# Patient Record
Sex: FEMALE | Race: WHITE
[De-identification: names, ages, dates, MRNs, and addresses within clinical notes are randomized per-mention and may not be internally consistent; named-entity substitution may affect disease eponyms.]

---

## 2018-01-01 ENCOUNTER — HOSPITAL ENCOUNTER (INPATIENT)
Dept: HOSPITAL 45 - C.EDB | Age: 83
LOS: 25 days | Discharge: NURSING, REHAB OR LONG-TERM CARE | DRG: 562 | End: 2018-01-26
Attending: HOSPITALIST | Admitting: HOSPITALIST
Payer: COMMERCIAL

## 2018-01-01 VITALS
BODY MASS INDEX: 37.2 KG/M2 | BODY MASS INDEX: 37.2 KG/M2 | HEIGHT: 66 IN | BODY MASS INDEX: 37.2 KG/M2 | WEIGHT: 231.49 LBS | WEIGHT: 231.49 LBS | HEIGHT: 66 IN | BODY MASS INDEX: 37.2 KG/M2

## 2018-01-01 VITALS — OXYGEN SATURATION: 97 % | HEART RATE: 115 BPM

## 2018-01-01 DIAGNOSIS — E87.5: ICD-10-CM

## 2018-01-01 DIAGNOSIS — Z51.5: ICD-10-CM

## 2018-01-01 DIAGNOSIS — Z88.2: ICD-10-CM

## 2018-01-01 DIAGNOSIS — I50.31: ICD-10-CM

## 2018-01-01 DIAGNOSIS — J44.0: ICD-10-CM

## 2018-01-01 DIAGNOSIS — Z66: ICD-10-CM

## 2018-01-01 DIAGNOSIS — J96.22: ICD-10-CM

## 2018-01-01 DIAGNOSIS — J18.9: ICD-10-CM

## 2018-01-01 DIAGNOSIS — J44.1: ICD-10-CM

## 2018-01-01 DIAGNOSIS — E78.5: ICD-10-CM

## 2018-01-01 DIAGNOSIS — S32.810A: ICD-10-CM

## 2018-01-01 DIAGNOSIS — I11.0: ICD-10-CM

## 2018-01-01 DIAGNOSIS — F17.200: ICD-10-CM

## 2018-01-01 DIAGNOSIS — Z99.81: ICD-10-CM

## 2018-01-01 DIAGNOSIS — J96.21: ICD-10-CM

## 2018-01-01 DIAGNOSIS — I16.0: ICD-10-CM

## 2018-01-01 DIAGNOSIS — F32.9: ICD-10-CM

## 2018-01-01 DIAGNOSIS — Z79.82: ICD-10-CM
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
## 2018-01-01 DIAGNOSIS — I48.92: ICD-10-CM

## 2018-01-01 DIAGNOSIS — E11.9: ICD-10-CM

## 2018-01-01 DIAGNOSIS — Z91.81: ICD-10-CM

## 2018-01-01 DIAGNOSIS — E66.01: ICD-10-CM

## 2018-01-01 DIAGNOSIS — Z86.73: ICD-10-CM

## 2018-01-01 DIAGNOSIS — S82.842A: Primary | ICD-10-CM

## 2018-01-01 DIAGNOSIS — B37.0: ICD-10-CM

## 2018-01-01 DIAGNOSIS — E87.1: ICD-10-CM

## 2018-01-01 DIAGNOSIS — R29.6: ICD-10-CM

## 2018-01-01 DIAGNOSIS — K51.90: ICD-10-CM

## 2018-01-01 DIAGNOSIS — Z79.899: ICD-10-CM

## 2018-01-01 LAB
ALBUMIN SERPL-MCNC: 2.7 GM/DL (ref 3.4–5)
ALP SERPL-CCNC: 91 U/L (ref 45–117)
ALT SERPL-CCNC: 28 U/L (ref 12–78)
AST SERPL-CCNC: 22 U/L (ref 15–37)
BASOPHILS # BLD: 0.01 K/UL (ref 0–0.2)
BASOPHILS NFR BLD: 0.1 %
BUN SERPL-MCNC: 24 MG/DL (ref 7–18)
CALCIUM SERPL-MCNC: 8.6 MG/DL (ref 8.5–10.1)
CO2 SERPL-SCNC: 33 MMOL/L (ref 21–32)
CREAT SERPL-MCNC: 0.89 MG/DL (ref 0.6–1.2)
EOS ABS #: 0 K/UL (ref 0–0.5)
EOSINOPHIL NFR BLD AUTO: 309 K/UL (ref 130–400)
GLUCOSE SERPL-MCNC: 270 MG/DL (ref 70–99)
HCT VFR BLD CALC: 34.3 % (ref 37–47)
HGB BLD-MCNC: 11.3 G/DL (ref 12–16)
IG#: 0.06 K/UL (ref 0–0.02)
IMM GRANULOCYTES NFR BLD AUTO: 5.1 %
INFLUENZA B ANTIGEN: (no result)
INR PPP: 1 (ref 0.9–1.1)
LIPASE: 264 U/L (ref 73–393)
LYMPHOCYTES # BLD: 0.72 K/UL (ref 1.2–3.4)
MCH RBC QN AUTO: 30.4 PG (ref 25–34)
MCHC RBC AUTO-ENTMCNC: 32.9 G/DL (ref 32–36)
MCV RBC AUTO: 92.2 FL (ref 80–100)
MONO ABS #: 1.93 K/UL (ref 0.11–0.59)
MONOCYTES NFR BLD: 13.7 %
NEUT ABS #: 11.37 K/UL (ref 1.4–6.5)
NEUTROPHILS # BLD AUTO: 0 %
NEUTROPHILS NFR BLD AUTO: 80.7 %
PMV BLD AUTO: 9.7 FL (ref 7.4–10.4)
POTASSIUM SERPL-SCNC: 5 MMOL/L (ref 3.5–5.1)
PROT SERPL-MCNC: 7.6 GM/DL (ref 6.4–8.2)
RED CELL DISTRIBUTION WIDTH CV: 13.8 % (ref 11.5–14.5)
RED CELL DISTRIBUTION WIDTH SD: 46.5 FL (ref 36.4–46.3)
SODIUM SERPL-SCNC: 128 MMOL/L (ref 136–145)
WBC # BLD AUTO: 14.09 K/UL (ref 4.8–10.8)

## 2018-01-01 RX ADMIN — LEVALBUTEROL HYDROCHLORIDE SCH MG: 1.25 SOLUTION RESPIRATORY (INHALATION) at 22:30

## 2018-01-01 NOTE — DIAGNOSTIC IMAGING REPORT
CT SCAN OF THE BRAIN WITHOUT IV CONTRAST



CLINICAL HISTORY: Change in mental status. Fall. Dizziness.



COMPARISON STUDY:  CT of the brain dated 12/22/2016.



TECHNIQUE: Unenhanced axial CT scan of the brain is performed from the vertex to

the skull base. A dose lowering technique was utilized adhering to the

principles of ALARA. The skull base was scanned twice due to motion artifact.



CT DOSE: 1228.53 mGy.cm



FINDINGS:



Brain parenchyma: There are age-related involutional changes noting  moderate

subcortical and periventricular microangiopathic change. Right parietal

encephalomalacia is unchanged and consistent with a remote infarct. A chronic

lacunar infarct is noted in the right internal capsule. There is no hemorrhage,

mass effect, or evidence of acute territorial ischemia by CT criteria. A coarse

calcification in the cerebellum is unchanged. Gray-white matter is preserved. No

extra-axial fluid collection is seen.



Ventricles, sulci, cisterns: Prominent secondary to involutional change.



Intracranial vasculature: There is atherosclerotic calcification of the

cavernous carotid and vertebral arteries.



Calvarium: The skeletal structures are osteopenic. No depressed calvarial

fracture is seen.



Sinuses and mastoids: Mild to moderate mucosal thickening is present within the

ethmoid, sphenoid, and maxillary sinuses. Air-fluid levels are present within

the maxillary antra. There is a trace left mastoid effusion. The right mastoid

air cells are well pneumatized.



Orbits: The bony orbits are grossly intact. There are bilateral ocular lens

implants.





IMPRESSION:



1. Senescent change and remote infarct as above. There is no hemorrhage, mass

effect, or evidence of acute territorial ischemia by CT criteria.



2. Paranasal sinus disease as above. Correlate clinically for evidence of

sinusitis.







Electronically signed by:  Arvin Bailey M.D.

1/1/2018 8:06 PM



Dictated Date/Time:  1/1/2018 8:03 PM

## 2018-01-01 NOTE — DIAGNOSTIC IMAGING REPORT
SINGLE VIEW CHEST



CLINICAL HISTORY:  Fever. Cough and dyspnea.



FINDINGS: An AP, portable, upright chest radiograph is compared to study dated

12/22/2016 and correlated with chest CT dated 9/1/2011. The examination is

degraded by portable technique and patient rotation.  The heart is enlarged and

there is atherosclerotic calcification of the thoracic aorta. The pulmonary

vasculature is noncongested. There is dense bibasilar airspace consolidation,

right greater than left. Small pleural effusions are identified. No pneumothorax

is seen. The skeletal structures are osteopenic. The bony thorax is grossly

intact. Calcific tendinopathy is noted in the right shoulder.



IMPRESSION:



1. Cardiomegaly without radiographic evidence of congestive failure.



2. There is dense bibasilar airspace consolidation, right greater than left with

associated pleural effusions. The appearance is typical for pneumonia/aspiration

pneumonitis. Clinical correlation will be required and radiographic follow-up to

resolution is recommended.







Electronically signed by:  Arvin Bailey M.D.

1/1/2018 7:24 PM



Dictated Date/Time:  1/1/2018 7:23 PM

## 2018-01-01 NOTE — EMERGENCY ROOM VISIT NOTE
History


Report prepared by Rowanibmary:  Barby Bazan


Under the Supervision of:  Dr. Adrian Pacheco M.D.


First contact with patient:  18:05


Chief Complaint:  FALL


Stated Complaint:  FALL, ANKLE FX, SOB, COUGH, DIZZY, FLU LIKE SX


Nursing Triage Summary:  


arrived bls in resp distress. sat 88% on 15l mask. course expir wheezing, dim 


thru out. rr 36. bp 230/115. c/o dizzy from a "cold", fell sat on right side, 


denies injury. today fell d/t dizziness, c/o left ankle pain, swelling, 


deformity, ecchymosis.





left foot cool, delayed cap refill, holds at an outward rotation, able to 


dorsiflex and plantar flex





History of Present Illness


The patient is an 82 year old female who presents to the Emergency Room with 

complaints of an episode of a fall occurring prior to arrival. The patient 

states that she fell twice in the past three days. She reports that she fell 

both times on her left side and could not get up both times on her own. She 

notes that her sons helped her up. She states that she has not been able to 

walk well since the first fall. She states that she has not been feeling well 

the last few days. The patient complains of a cold, shortness of breath, left 

foot pain, and dizziness. The patient denies hitting her head, loss of 

consciousness, and a fever. The patient notes that she has COPD, CHF, and takes 

Lasix. She reports that she normally walks without any assistance of someone or 

a walker.





   Source of History:  patient


   Onset:  prior to arrival


   Position:  other (global)


   Quality:  other (flu-like symptoms)


   Timing:  other (episode)


   Associated Symptoms:  + SOB, No LOC, No fevers


Note:


The patient complains of left foot pain, a cold, and dizziness. The patient 

denies hitting her head.





Review of Systems


See HPI for pertinent positives and negatives.  A total of ten systems were 

reviewed and were otherwise negative.





Past Medical & Surgical


Medical Problems:


(1) Benign hypertension


(2) CHF (congestive heart failure)


(3) COPD (chronic obstructive pulmonary disease)


(4) Diabetes mellitus type 2


(5) Duodenal ulcer disease


(6) Dyslipidemia


(7) Generalized osteoarthritis


(8) Paroxysmal supraventricular tachycardia


(9) Respiratory failure


(10) TIA/CVA


(11) Ulcerative colitis








Family History





FH: cancer


FH: heart disease


Hypertension





Social History


Smoking Status:  Current Every Day Smoker


Alcohol Use:  none


Marital Status:  


Housing Status:  lives with family


Occupation Status:  retired





Current/Historical Medications


Scheduled


Aspirin (Aspirin Ec), 81 MG PO DAILY


Calcium Carbonate-Cholecalcife (Calcium/Vitamin D3 600-400 mg-Unit), 1 TAB PO 

DAILY


Citalopram (Citalopram Hydrobromide), 40 MG PO DAILY


Docusate Sodium (Docusate Sodium), 100 MG PO BID


Docusate Sodium (Colace), 1 CAP PO BID


Furosemide (Lasix), 20 MG PO QAM


Ipratropium-Albuterol (Combivent Respimat), 1 PUFFS INH QID


Latanoprost (Xalatan 0.005% Oph Sol), 1 DROPS OP HS


Lidocaine (Lidoderm Patch 5%), 1 PATCH TOP ONAMOFFPM


Lisinopril (Prinivil), 20 MG PO DAILY


Mesalamine (Pentasa), 1,000 MG PO BID


Mirtazapine (Remeron), 15 MG PO HS


Pantoprazole (Protonix), 40 MG PO DAILY


Polyethylene Glycol 3350 (Miralax), 17 GM PO DAILY


Sennosides-Docusate Sodium (Senna S), 1 TAB PO DAILY


Simvastatin (Zocor), 20 MG PO HS





Scheduled PRN


Acetaminophen (Tylenol), 650 MG PO Q4 PRN for Pain or Fever





Allergies


Coded Allergies:  


     Sulfa Drugs (Verified  Allergy, Unknown, `, 5/18/14)


     Sulfamethoxazole (Verified  Allergy, Unknown, `, 5/18/14)


     Trimethoprim (Verified  Allergy, Unknown, `, 5/18/14)





Physical Exam


Vital Signs











  Date Time  Temp Pulse Resp B/P (MAP) Pulse Ox O2 Delivery O2 Flow Rate FiO2


 


1/1/18 21:00    147/65    


 


1/1/18 20:55  95 23  96   


 


1/1/18 20:46    136/67    


 


1/1/18 20:40  95 23  97   


 


1/1/18 20:31    184/77    


 


1/1/18 20:25  96 27  98   


 


1/1/18 20:15    180/74    


 


1/1/18 20:10  101 25  99   


 


1/1/18 20:05  101 25  99   


 


1/1/18 20:04    180/80    


 


1/1/18 19:45    167/80    


 


1/1/18 19:35  91 24  98   


 


1/1/18 19:30    154/70    


 


1/1/18 19:20  93 23  99   


 


1/1/18 19:15    164/78    


 


1/1/18 19:05  105 21  99   


 


1/1/18 19:00    158/71    


 


1/1/18 18:57  99 21  100   


 


1/1/18 18:56    161/76    


 


1/1/18 18:52   17     


 


1/1/18 18:47  107 17  98   


 


1/1/18 18:42  109 24  99   


 


1/1/18 18:42  109      


 


1/1/18 18:37  112 28  98   


 


1/1/18 18:32  117 17     


 


1/1/18 18:30     93 BiPAP  100


 


1/1/18 18:27  112 18     


 


1/1/18 18:26  115   97   100


 


1/1/18 18:22  115 25     


 


1/1/18 18:17  120 35     


 


1/1/18 18:14    193/77    


 


1/1/18 18:05 37.3 115 36 230/115 93 Nebulizer  


 


1/1/18 18:00     85 Nasal Cannula 4.0 


 


1/1/18 18:00     93 Nebulizer  


 


1/1/18 17:59     80 Room Air  











Physical Exam


GENERAL: Awake, alert, in moderate respiratory distress


HENT: Normocephalic, atraumatic. Oropharynx unremarkable. Dry, cracked mucus 

membranes.


EYES: Normal conjunctiva. Sclera non-icteric.


NECK: Supple. No nuchal rigidity. FROM. No JVD.


RESPIRATORY: Diffuse rhonchi and wheezes diminished at bases. 


CARDIAC: Sinus tachycardia, normal rhythm. Extremities warm and well perfused. 

Pulses equal.


ABDOMEN: Obese, soft, non-distended. No tenderness to palpation. No rebound or 

guarding. No masses.


RECTAL: Deferred.


MUSCULOSKELETAL: Chest examination reveals no tenderness. The back is 

symmetrical on inspection without obvious abnormality. There is no CVA 

tenderness to palpation. No joint edema. 


LOWER EXTREMITIES: Calves are equal size bilaterally and non-tender. Mild edema 

with ecchymosis to the medial malleolus and tenderness. Foot is slightly cool 

compared to the right. Slightly delayed capillary refill. PT/DP pulses present. 


NEURO: Normal sensorium. No sensory or motor deficits noted. 


SKIN: No rash or jaundice noted.





Medical Decision & Procedures


ER Provider


Diagnostic Interpretation:


Radiology results as stated below per my review and radiologist interpretation: 





LEFT ANKLE 3 VIEWS





CLINICAL HISTORY: Fall with left ankle injury.





FINDINGS: 3 views of the left ankle are obtained. No prior studies are available


for comparison at the time of dictation. The skeletal structures are osteopenic.


There is a distracted fracture of the medial malleolus. The fragment is offset


by 4 mm. There is also a distracted and mildly overriding fracture through the


distal fibular shaft. There is widening of the medial joint space, with lateral


subluxation of the talus. A joint effusion is identified. Soft tissue edema is


present around the ankle. Arthritic change is noted in the foot. There is


atherosclerotic calcification of the regional arteries.





IMPRESSION:





1. Distal tibial and fibular fractures as above with mild lateral subluxation of


the talus.





2. Soft tissue swelling and joint effusion.











Electronically signed by:  Arvin Bailey M.D.


1/1/2018 7:18 PM





Dictated Date/Time:  1/1/2018 7:16 PM








LEFT FOOT 2 VIEWS





CLINICAL HISTORY: Fall with left foot pain.





FINDINGS: AP and lateral views of the left foot are obtained. No prior studies


are available for comparison at the time of dictation. The skeletal structures


are osteopenic. No fracture is seen. Mild arthritic change and bony spurring is


noted at the first metatarsophalangeal joint. Degenerative change is also seen


at the tarsometatarsal articulations. There is pes planus, with degenerative


spurring noted along the dorsal aspect of the tarsal bones. Soft tissue edema is


noted throughout the foot. Fracture is seen at the ankle joint.





IMPRESSION:





1. Soft tissue edema with no radiographic evidence of left foot fracture.





2. Fractures are partially imaged at the ankle joint.





3. Osteopenia, pes planus, and degenerative change as above.











Electronically signed by:  Arvin Bailey M.D.


1/1/2018 7:16 PM





Dictated Date/Time:  1/1/2018 7:14 PM








SINGLE VIEW PELVIS





CLINICAL HISTORY: Fall. Pelvic pain.





FINDINGS: 2 AP, portable, supine pelvic radiograph is are compared to study


dated 5/18/2014. The skeletal structures are osteopenic. A right hip


arthroplasty is in near-anatomic alignment. There is an age indeterminant right


inferior pubic ring fracture, new from 2014. No additional findings are


concerning for acute fracture. Mild arthritic change is noted in the left hip.


Sclerotic change is observed in the sacroiliac joints. There is advanced


atherosclerotic calcification of the femoral arteries. The overlying soft


tissues are normal in appearance.





IMPRESSION:





1. There is an age indeterminant right pubic ring fracture, possibly chronic.


This was not seen in 2014.





2. No additional findings are concerning for acute fracture.





3. Osteopenia, degenerative change, and right hip arthroplasty as above.











Electronically signed by:  Arvin Bailey M.D.


1/1/2018 7:14 PM





Dictated Date/Time:  1/1/2018 7:11 PM








SINGLE VIEW CHEST





CLINICAL HISTORY:  Fever. Cough and dyspnea.





FINDINGS: An AP, portable, upright chest radiograph is compared to study dated


12/22/2016 and correlated with chest CT dated 9/1/2011. The examination is


degraded by portable technique and patient rotation.  The heart is enlarged and


there is atherosclerotic calcification of the thoracic aorta. The pulmonary


vasculature is noncongested. There is dense bibasilar airspace consolidation,


right greater than left. Small pleural effusions are identified. No pneumothorax


is seen. The skeletal structures are osteopenic. The bony thorax is grossly


intact. Calcific tendinopathy is noted in the right shoulder.





IMPRESSION:





1. Cardiomegaly without radiographic evidence of congestive failure.





2. There is dense bibasilar airspace consolidation, right greater than left with


associated pleural effusions. The appearance is typical for pneumonia/aspiration


pneumonitis. Clinical correlation will be required and radiographic follow-up to


resolution is recommended.











Electronically signed by:  Arvin Bailey M.D.


1/1/2018 7:24 PM





Dictated Date/Time:  1/1/2018 7:23 PM








CT SCAN OF THE BRAIN WITHOUT IV CONTRAST





CLINICAL HISTORY: Change in mental status. Fall. Dizziness.





COMPARISON STUDY:  CT of the brain dated 12/22/2016.





TECHNIQUE: Unenhanced axial CT scan of the brain is performed from the vertex to


the skull base. A dose lowering technique was utilized adhering to the


principles of ALARA. The skull base was scanned twice due to motion artifact.





CT DOSE: 1228.53 mGy.cm





FINDINGS:





Brain parenchyma: There are age-related involutional changes noting  moderate


subcortical and periventricular microangiopathic change. Right parietal


encephalomalacia is unchanged and consistent with a remote infarct. A chronic


lacunar infarct is noted in the right internal capsule. There is no hemorrhage,


mass effect, or evidence of acute territorial ischemia by CT criteria. A coarse


calcification in the cerebellum is unchanged. Gray-white matter is preserved. No


extra-axial fluid collection is seen.





Ventricles, sulci, cisterns: Prominent secondary to involutional change.





Intracranial vasculature: There is atherosclerotic calcification of the


cavernous carotid and vertebral arteries.





Calvarium: The skeletal structures are osteopenic. No depressed calvarial


fracture is seen.





Sinuses and mastoids: Mild to moderate mucosal thickening is present within the


ethmoid, sphenoid, and maxillary sinuses. Air-fluid levels are present within


the maxillary antra. There is a trace left mastoid effusion. The right mastoid


air cells are well pneumatized.





Orbits: The bony orbits are grossly intact. There are bilateral ocular lens


implants.








IMPRESSION:





1. Senescent change and remote infarct as above. There is no hemorrhage, mass


effect, or evidence of acute territorial ischemia by CT criteria.





2. Paranasal sinus disease as above. Correlate clinically for evidence of


sinusitis.











Electronically signed by:  Arvin Bailey M.D.


1/1/2018 8:06 PM





Dictated Date/Time:  1/1/2018 8:03 PM





Laboratory Results











Test


  1/1/18


18:20 1/1/18


18:22 1/1/18


18:31 1/1/18


18:32


 


Influenza Type A Antigen


  Neg for Influ


A (NEG) 


  


  


 


 


Influenza Type B Antigen


  Neg for Influ


B (NEG) 


  


  


 


 


Prothrombin Time


  


  10.8 SECONDS


(9.0-12.0) 


  


 


 


Prothromb Time International


Ratio 


  1.0 (0.9-1.1) 


  


  


 


 


Osmolality


  


  273 mOsm/kg


(280-300) 


  


 


 


Total Bilirubin


  


  0.3 mg/dl


(0.2-1) 


  


 


 


Direct Bilirubin


  


  0.1 mg/dl


(0-0.2) 


  


 


 


Aspartate Amino Transf


(AST/SGOT) 


  22 U/L (15-37) 


  


  


 


 


Alanine Aminotransferase


(ALT/SGPT) 


  28 U/L (12-78) 


  


  


 


 


Alkaline Phosphatase


  


  91 U/L


() 


  


 


 


Troponin I


  


  < 0.015 ng/ml


(0-0.045) 


  


 


 


Pro-B-Type Natriuretic Peptide


  


  3508 pg/ml


(0-1800) 


  


 


 


Total Protein


  


  7.6 gm/dl


(6.4-8.2) 


  


 


 


Albumin


  


  2.7 gm/dl


(3.4-5.0) 


  


 


 


Lipase


  


  264 U/L


() 


  


 


 


Venous Blood pH


  


  


  7.22


(7.36-7.41) 


 


 


Venous Blood Partial Pressure


CO2 


  


  85 mmHg


(38.0-50.0) 


 


 


Venous Blood Partial Pressure


O2 


  


  47 mmHg 


  


 


 


Venous Blood HCO3   34 mmol/L  


 


Venous Blood Oxygen Saturation   74.5 %  


 


Venous Blood Base Excess   3.7 mEq/L  


 


Lactic Acid Level


  


  


  


  1.1 mmol/L


(0.4-2.0)





Laboratory results reviewed by me





Medications Administered











 Medications


  (Trade)  Dose


 Ordered  Sig/Donna


 Route  Start Time


 Stop Time Status Last Admin


Dose Admin


 


 Albuterol/


 Ipratropium


  (Duoneb)  3 ml  STK-MED ONCE


 .ROUTE  1/1/18 17:57


 1/1/18 17:58 DC 1/1/18 18:04


3 ML


 


 Methylprednisolone


 Sodium Succinate


  (Solu-Medrol IV)  125 mg  NOW  STAT


 IV  1/1/18 18:14


 1/1/18 18:19 DC 1/1/18 18:24


125 MG


 


 Albuterol/


 Ipratropium


  (Duoneb)  12 ml  ONE  ONCE


 INH  1/1/18 18:15


 1/1/18 18:19 DC 1/1/18 18:34


12 ML


 


 Magnesium Sulfate


  (Magnesium


 Sulfate)  2 gm  NOW  STAT


 IV  1/1/18 18:14


 1/1/18 18:19 DC 1/1/18 18:27


2 GM


 


 Nitroglycerin


  (Nitrostat Tab)  0.4 mg  NOW  STAT


 SL  1/1/18 18:14


 1/1/18 18:19 DC 1/1/18 18:28


0.4 MG


 


 Vancomycin HCl


 2250 mg/Sodium


 Chloride  545 ml @ 


 200 mls/hr  ONE  STAT


 IV  1/1/18 20:00


 1/1/18 22:43 DC 1/1/18 21:46


200 MLS/HR


 


 Piperacillin Sod/


 Tazobactam Sod


  (Zosyn Iv)  4.5 gm  NOW  STAT


 IV  1/1/18 20:00


 1/1/18 20:05 DC 1/1/18 20:38


4.5 GM


 


 Sodium Chloride  500 ml @ 


 125 mls/hr  Q4H STAT


 IV  1/1/18 20:00


 1/1/18 23:59 DC 1/1/18 20:32


125 MLS/HR


 


 Fentanyl Citrate


  (Fentanyl Inj)  50 mcg  NOW  ONCE


 IV  1/1/18 20:30


 1/1/18 20:31 DC 1/1/18 20:33


50 MCG











ECG


Indication:  SOB/dyspnea


Rate (beats per minute):  115


Rhythm:  sinus tachycardia


Findings:  PAC, no acute ischemic change, other (normal axis)





ED Course


1807: The patient was evaluated in room B11B. A complete history and physical 

exam was performed.





2006: I discussed the patient with Dr. Lashell Estrella will evaluate the 

patient for further treatment.





2009: I reevaluated the patient and updated her on her test results.





2012: I discussed the patient's case with Dr. Correia- Orthopedics. He agrees to 

a posterior splint with a sugar tong. He will evaluate her tomorrow.





2020: I reevaluated the patient and checked her pulses. She has strong Doppler-

able pulse TP and DP.





Medical Decision


I reviewed the patient's past medical history, medications, and the nursing 

notes as described above.





Etiologies such as infections, reactive airway disease, pneumonia, pneumothorax

, COPD, CHF, cardiac ischemia, pulmonary embolism, musculoskeletal, 

gastrointestinal, as well as others were entertained.





The patient is an 82-year-old woman with a past medical history of COPD on home 

O2 as well as CHF on Lasix who presents emergency Department with generalized 

weakness over the past several days with lightheadedness and a fall on Saturday 

under her left side and subsequent fall today onto her left side presents in 

moderate respiratory distress per hpi.  The patient is uncomfortable tachypneic 

and tachycardic with labored breathing.  She is afebrile, on exam the patient 

has diffuse wheezes and rhonchi in this diminished at the bases.  She was 

placed on BiPAP for severe work of breathing and put on continuous neb given 

Solu-Medrol and magnesium.  Bedside ultrasound does not show any significant B 

lines suggesting likely more predominant COPD picture.  IVC with minimal 

variability suggesting possible Euvolemia.  Ankle has mild edema with 

ecchymosis to the medial malleolus.  Hips with full range of motion without any 

pain. Respiratory status improved on Bipap. CXR with likley aspiration pna. 

Given patient's severity on arrival treated with Vanc/zosyn. CO2 80s suggesting 

COPD component. WBC 14. Hyponatremia to 128 with BUN/Cr > 20 suggesting mild 

prerenal component. Given 500c gentle hydration. Plain film of xray 

demonstrates fibular and medial malleolar fx with slight lateral talar 

displacement. D/w Dr. Correia, ortho on-call, who agrees with plan to splint. 

Ortho to eval inpatient for possible surgery. Case d/w Dr. Lobo, [facility] 

hospitalist, who will admit the patient for further management.





Medication Reconcilliation


Current Medication List:  was personally reviewed by me





Blood Pressure Screening


Patient's blood pressure:  Elevated blood pressure


Blood pressure disposition:  Elevated BP felt to be situational





Consults


Time Called:  2005


Consulting Physician:  Dr. Lashell Estrella Hospitalist


Returned Call:  2006


I discussed the patient with Dr. Lashell Estrella will evaluate the patient 

for further treatment.


Additional Consults:  


   Time Called:  2009


   Consulted Physician:  Dr. Correia- Orthopedics


   Returned Call:  2012


Additional Comments:


I discussed the patient's case with Dr. Correia- Orthopedics. He agrees to a 

posterior splint with a sugar tong. He will evaluate her tomorrow.





Impression





 Primary Impression:  


 Acute on chronic respiratory failure with hypoxia and hypercapnia


 Additional Impressions:  


 Pneumonia


 Bimalleolar ankle fracture


 Hyponatremia





Critical Care


I have personally spent greater than 90 minutes of critical care time in the 

direct management of this patient.  This includes bedside care, interpretation 

of diagnostic studies, and testing, discussion with consultants, patient, and 

family members, and other required patient management activities.  This 90 

minutes is in excess of all separately billable procedures.





Scribe Attestation


The scribe's documentation has been prepared under my direction and personally 

reviewed by me in its entirety. I confirm that the note above accurately 

reflects all work, treatment, procedures, and medical decision making performed 

by me.





Departure Information


Dispostion


Being Evaluated By Hospitalist





Josias Lundberg M.D. (PCP)





Patient Instructions


My [facility]





Problem Qualifiers

## 2018-01-01 NOTE — DIAGNOSTIC IMAGING REPORT
SINGLE VIEW PELVIS



CLINICAL HISTORY: Fall. Pelvic pain.



FINDINGS: 2 AP, portable, supine pelvic radiograph is are compared to study

dated 5/18/2014. The skeletal structures are osteopenic. A right hip

arthroplasty is in near-anatomic alignment. There is an age indeterminant right

inferior pubic ring fracture, new from 2014. No additional findings are

concerning for acute fracture. Mild arthritic change is noted in the left hip.

Sclerotic change is observed in the sacroiliac joints. There is advanced

atherosclerotic calcification of the femoral arteries. The overlying soft

tissues are normal in appearance.



IMPRESSION:



1. There is an age indeterminant right pubic ring fracture, possibly chronic.

This was not seen in 2014.



2. No additional findings are concerning for acute fracture.



3. Osteopenia, degenerative change, and right hip arthroplasty as above.







Electronically signed by:  Arvin Bailey M.D.

1/1/2018 7:14 PM



Dictated Date/Time:  1/1/2018 7:11 PM

## 2018-01-01 NOTE — DIAGNOSTIC IMAGING REPORT
LEFT ANKLE 3 VIEWS



CLINICAL HISTORY: Fall with left ankle injury.



FINDINGS: 3 views of the left ankle are obtained. No prior studies are available

for comparison at the time of dictation. The skeletal structures are osteopenic.

There is a distracted fracture of the medial malleolus. The fragment is offset

by 4 mm. There is also a distracted and mildly overriding fracture through the

distal fibular shaft. There is widening of the medial joint space, with lateral

subluxation of the talus. A joint effusion is identified. Soft tissue edema is

present around the ankle. Arthritic change is noted in the foot. There is

atherosclerotic calcification of the regional arteries.



IMPRESSION:



1. Distal tibial and fibular fractures as above with mild lateral subluxation of

the talus.



2. Soft tissue swelling and joint effusion.







Electronically signed by:  Arvin Bailey M.D.

1/1/2018 7:18 PM



Dictated Date/Time:  1/1/2018 7:16 PM

## 2018-01-01 NOTE — DIAGNOSTIC IMAGING REPORT
LEFT FOOT 2 VIEWS



CLINICAL HISTORY: Fall with left foot pain.



FINDINGS: AP and lateral views of the left foot are obtained. No prior studies

are available for comparison at the time of dictation. The skeletal structures

are osteopenic. No fracture is seen. Mild arthritic change and bony spurring is

noted at the first metatarsophalangeal joint. Degenerative change is also seen

at the tarsometatarsal articulations. There is pes planus, with degenerative

spurring noted along the dorsal aspect of the tarsal bones. Soft tissue edema is

noted throughout the foot. Fracture is seen at the ankle joint.



IMPRESSION:



1. Soft tissue edema with no radiographic evidence of left foot fracture.



2. Fractures are partially imaged at the ankle joint.



3. Osteopenia, pes planus, and degenerative change as above.







Electronically signed by:  Arvin Bailey M.D.

1/1/2018 7:16 PM



Dictated Date/Time:  1/1/2018 7:14 PM

## 2018-01-02 VITALS — HEART RATE: 113 BPM | OXYGEN SATURATION: 92 %

## 2018-01-02 VITALS
TEMPERATURE: 97.7 F | SYSTOLIC BLOOD PRESSURE: 172 MMHG | DIASTOLIC BLOOD PRESSURE: 80 MMHG | HEART RATE: 104 BPM | OXYGEN SATURATION: 95 %

## 2018-01-02 VITALS
OXYGEN SATURATION: 90 % | HEART RATE: 104 BPM | DIASTOLIC BLOOD PRESSURE: 93 MMHG | SYSTOLIC BLOOD PRESSURE: 147 MMHG | TEMPERATURE: 97.52 F

## 2018-01-02 VITALS — OXYGEN SATURATION: 93 % | HEART RATE: 106 BPM

## 2018-01-02 VITALS — OXYGEN SATURATION: 92 %

## 2018-01-02 VITALS
TEMPERATURE: 98.24 F | DIASTOLIC BLOOD PRESSURE: 69 MMHG | HEART RATE: 104 BPM | OXYGEN SATURATION: 93 % | SYSTOLIC BLOOD PRESSURE: 139 MMHG

## 2018-01-02 VITALS — HEART RATE: 75 BPM | OXYGEN SATURATION: 94 %

## 2018-01-02 VITALS
DIASTOLIC BLOOD PRESSURE: 74 MMHG | SYSTOLIC BLOOD PRESSURE: 158 MMHG | OXYGEN SATURATION: 94 % | HEART RATE: 78 BPM | TEMPERATURE: 99.14 F

## 2018-01-02 VITALS
OXYGEN SATURATION: 92 % | DIASTOLIC BLOOD PRESSURE: 119 MMHG | SYSTOLIC BLOOD PRESSURE: 178 MMHG | TEMPERATURE: 98.06 F | HEART RATE: 112 BPM

## 2018-01-02 VITALS — OXYGEN SATURATION: 93 % | HEART RATE: 105 BPM

## 2018-01-02 VITALS — OXYGEN SATURATION: 94 % | HEART RATE: 80 BPM

## 2018-01-02 VITALS
DIASTOLIC BLOOD PRESSURE: 78 MMHG | HEART RATE: 90 BPM | SYSTOLIC BLOOD PRESSURE: 169 MMHG | OXYGEN SATURATION: 94 % | TEMPERATURE: 99.14 F

## 2018-01-02 VITALS — HEART RATE: 103 BPM | OXYGEN SATURATION: 92 %

## 2018-01-02 VITALS — OXYGEN SATURATION: 94 % | HEART RATE: 97 BPM

## 2018-01-02 LAB
BASOPHILS # BLD: 0.01 K/UL (ref 0–0.2)
BASOPHILS NFR BLD: 0.1 %
BUN SERPL-MCNC: 23 MG/DL (ref 7–18)
BUN SERPL-MCNC: 23 MG/DL (ref 7–18)
CALCIUM SERPL-MCNC: 8.3 MG/DL (ref 8.5–10.1)
CALCIUM SERPL-MCNC: 8.6 MG/DL (ref 8.5–10.1)
CO2 SERPL-SCNC: 32 MMOL/L (ref 21–32)
CO2 SERPL-SCNC: 34 MMOL/L (ref 21–32)
CREAT SERPL-MCNC: 0.69 MG/DL (ref 0.6–1.2)
CREAT SERPL-MCNC: 0.81 MG/DL (ref 0.6–1.2)
EOS ABS #: 0 K/UL (ref 0–0.5)
EOSINOPHIL NFR BLD AUTO: 259 K/UL (ref 130–400)
GLUCOSE SERPL-MCNC: 134 MG/DL (ref 70–99)
GLUCOSE SERPL-MCNC: 216 MG/DL (ref 70–99)
HCT VFR BLD CALC: 29.3 % (ref 37–47)
HGB BLD-MCNC: 9.4 G/DL (ref 12–16)
IG#: 0.04 K/UL (ref 0–0.02)
IMM GRANULOCYTES NFR BLD AUTO: 4.5 %
LYMPHOCYTES # BLD: 0.46 K/UL (ref 1.2–3.4)
MCH RBC QN AUTO: 29.4 PG (ref 25–34)
MCHC RBC AUTO-ENTMCNC: 32.1 G/DL (ref 32–36)
MCV RBC AUTO: 91.6 FL (ref 80–100)
MONO ABS #: 1.22 K/UL (ref 0.11–0.59)
MONOCYTES NFR BLD: 12 %
NEUT ABS #: 8.46 K/UL (ref 1.4–6.5)
NEUTROPHILS # BLD AUTO: 0 %
NEUTROPHILS NFR BLD AUTO: 83 %
PMV BLD AUTO: 9.6 FL (ref 7.4–10.4)
POTASSIUM SERPL-SCNC: 5.2 MMOL/L (ref 3.5–5.1)
POTASSIUM SERPL-SCNC: 5.5 MMOL/L (ref 3.5–5.1)
RED CELL DISTRIBUTION WIDTH CV: 13.8 % (ref 11.5–14.5)
RED CELL DISTRIBUTION WIDTH SD: 46.5 FL (ref 36.4–46.3)
SODIUM SERPL-SCNC: 129 MMOL/L (ref 136–145)
SODIUM SERPL-SCNC: 131 MMOL/L (ref 136–145)
WBC # BLD AUTO: 10.19 K/UL (ref 4.8–10.8)

## 2018-01-02 RX ADMIN — SODIUM CHLORIDE SCH MLS/HR: 900 INJECTION, SOLUTION INTRAVENOUS at 01:31

## 2018-01-02 RX ADMIN — STANDARDIZED SENNA CONCENTRATE AND DOCUSATE SODIUM SCH TAB: 8.6; 5 TABLET ORAL at 07:59

## 2018-01-02 RX ADMIN — PIPERACILLIN SODIUM, TAZOBACTAM SODIUM SCH MLS/HR: 4; .5 INJECTION, POWDER, LYOPHILIZED, FOR SOLUTION INTRAVENOUS at 10:00

## 2018-01-02 RX ADMIN — SODIUM CHLORIDE SCH MLS/HR: 900 INJECTION, SOLUTION INTRAVENOUS at 18:17

## 2018-01-02 RX ADMIN — LEVALBUTEROL HYDROCHLORIDE SCH MG: 1.25 SOLUTION RESPIRATORY (INHALATION) at 02:10

## 2018-01-02 RX ADMIN — PANTOPRAZOLE SCH MG: 40 TABLET, DELAYED RELEASE ORAL at 07:57

## 2018-01-02 RX ADMIN — LEVALBUTEROL HYDROCHLORIDE SCH MG: 1.25 SOLUTION RESPIRATORY (INHALATION) at 14:07

## 2018-01-02 RX ADMIN — METHYLPREDNISOLONE SODIUM SUCCINATE SCH MLS/MIN: 1 INJECTION, POWDER, FOR SOLUTION INTRAMUSCULAR; INTRAVENOUS at 01:32

## 2018-01-02 RX ADMIN — TRAMADOL HYDROCHLORIDE PRN MG: 50 TABLET, COATED ORAL at 07:55

## 2018-01-02 RX ADMIN — PIPERACILLIN SODIUM, TAZOBACTAM SODIUM SCH MLS/HR: 4; .5 INJECTION, POWDER, LYOPHILIZED, FOR SOLUTION INTRAVENOUS at 18:16

## 2018-01-02 RX ADMIN — MESALAMINE SCH MG: 250 CAPSULE ORAL at 07:56

## 2018-01-02 RX ADMIN — LATANOPROST SCH DROPS: 50 SOLUTION/ DROPS OPHTHALMIC at 19:49

## 2018-01-02 RX ADMIN — METHYLPREDNISOLONE SODIUM SUCCINATE SCH MLS/MIN: 1 INJECTION, POWDER, FOR SOLUTION INTRAMUSCULAR; INTRAVENOUS at 14:19

## 2018-01-02 RX ADMIN — MESALAMINE SCH MG: 250 CAPSULE ORAL at 19:49

## 2018-01-02 RX ADMIN — CITALOPRAM HYDROBROMIDE SCH MG: 40 TABLET ORAL at 07:57

## 2018-01-02 RX ADMIN — CLONIDINE HYDROCHLORIDE PRN MG: 0.1 TABLET ORAL at 19:48

## 2018-01-02 RX ADMIN — LEVOFLOXACIN SCH MLS/HR: 5 INJECTION, SOLUTION INTRAVENOUS at 05:51

## 2018-01-02 RX ADMIN — HEPARIN SODIUM SCH UNIT: 10000 INJECTION, SOLUTION INTRAVENOUS; SUBCUTANEOUS at 07:57

## 2018-01-02 RX ADMIN — MIRTAZAPINE SCH MG: 15 TABLET, FILM COATED ORAL at 19:50

## 2018-01-02 RX ADMIN — PIPERACILLIN SODIUM, TAZOBACTAM SODIUM SCH MLS/HR: 4; .5 INJECTION, POWDER, LYOPHILIZED, FOR SOLUTION INTRAVENOUS at 01:32

## 2018-01-02 RX ADMIN — SIMVASTATIN SCH MG: 20 TABLET, FILM COATED ORAL at 19:51

## 2018-01-02 RX ADMIN — METHYLPREDNISOLONE SODIUM SUCCINATE SCH MLS/MIN: 1 INJECTION, POWDER, FOR SOLUTION INTRAMUSCULAR; INTRAVENOUS at 07:56

## 2018-01-02 RX ADMIN — LISINOPRIL SCH MG: 20 TABLET ORAL at 07:57

## 2018-01-02 RX ADMIN — METHYLPREDNISOLONE SODIUM SUCCINATE SCH MLS/MIN: 1 INJECTION, POWDER, FOR SOLUTION INTRAMUSCULAR; INTRAVENOUS at 19:48

## 2018-01-02 RX ADMIN — LEVALBUTEROL HYDROCHLORIDE SCH MG: 1.25 SOLUTION RESPIRATORY (INHALATION) at 07:04

## 2018-01-02 RX ADMIN — LEVALBUTEROL HYDROCHLORIDE SCH MG: 1.25 SOLUTION RESPIRATORY (INHALATION) at 19:09

## 2018-01-02 NOTE — PROGRESS NOTE
Medicine Progress Note


Date & Time of Visit:


Jan 2, 2018 at 10:20.


Subjective


Pt was seen and examined


Lying in bed with mild respiratory distress


Pt said that her breathing seems slightly better


Denies any chest pain, palpitation, dizziness and fever





Objective





Last 8 Hrs








  Date Time  Temp Pulse Resp B/P (MAP) Pulse Ox O2 Delivery O2 Flow Rate FiO2


 


1/2/18 09:00      Oxymask 10.0 


 


1/2/18 08:22 36.5 104 28 172/80 (110) 95   


 


1/2/18 07:04  105 26  93 Mask 10.0 


 


1/2/18 04:14 37.3 78 25 158/74 (102) 94 BiPAP  


 


1/2/18 04:00      BiPAP  50








Physical Exam:


General- No acute distress


Head-  atraumatic


Eyes- PERRL, EOMI


ENT- oropharynx clear


Neck- supple, no JVD


Lungs- clear to auscultation 


Heart- regular rhythm; no murmur


Abdomen- normal bowel sounds, soft


Extremities- no calf tenderness, Left ankle pain, splint with ace bandage in LE


Neuro- alert, oriented x 3; PERRL, EOMI


Skin- warm & dry


Laboratory Results:





Last 24 Hours








Test


  1/1/18


18:20 1/1/18


18:22 1/1/18


18:31 1/1/18


18:32


 


Influenza Type A Antigen


  Neg for Influ


A 


  


  


 


 


Influenza Type B Antigen


  Neg for Influ


B 


  


  


 


 


White Blood Count  14.09 K/uL   


 


Red Blood Count  3.72 M/uL   


 


Hemoglobin  11.3 g/dL   


 


Hematocrit  34.3 %   


 


Mean Corpuscular Volume  92.2 fL   


 


Mean Corpuscular Hemoglobin  30.4 pg   


 


Mean Corpuscular Hemoglobin


Concent 


  32.9 g/dl 


  


  


 


 


Platelet Count  309 K/uL   


 


Mean Platelet Volume  9.7 fL   


 


Neutrophils (%) (Auto)  80.7 %   


 


Lymphocytes (%) (Auto)  5.1 %   


 


Monocytes (%) (Auto)  13.7 %   


 


Eosinophils (%) (Auto)  0.0 %   


 


Basophils (%) (Auto)  0.1 %   


 


Neutrophils # (Auto)  11.37 K/uL   


 


Lymphocytes # (Auto)  0.72 K/uL   


 


Monocytes # (Auto)  1.93 K/uL   


 


Eosinophils # (Auto)  0.00 K/uL   


 


Basophils # (Auto)  0.01 K/uL   


 


RDW Standard Deviation  46.5 fL   


 


RDW Coefficient of Variation  13.8 %   


 


Immature Granulocyte % (Auto)  0.4 %   


 


Immature Granulocyte # (Auto)  0.06 K/uL   


 


Prothrombin Time  10.8 SECONDS   


 


Prothromb Time International


Ratio 


  1.0 


  


  


 


 


Sodium Level  128 mmol/L   


 


Potassium Level  5.0 mmol/L   


 


Chloride Level  93 mmol/L   


 


Carbon Dioxide Level  33 mmol/L   


 


Anion Gap  2.0 mmol/L   


 


Blood Urea Nitrogen  24 mg/dl   


 


Creatinine  0.89 mg/dl   


 


Est Creatinine Clear Calc


Drug Dose 


  58.1 ml/min 


  


  


 


 


Estimated GFR (


American) 


  70.0 


  


  


 


 


Estimated GFR (Non-


American 


  60.4 


  


  


 


 


BUN/Creatinine Ratio  26.6   


 


Random Glucose  270 mg/dl   


 


Osmolality  273 mOsm/kg   


 


Calcium Level  8.6 mg/dl   


 


Total Bilirubin  0.3 mg/dl   


 


Direct Bilirubin  0.1 mg/dl   


 


Aspartate Amino Transf


(AST/SGOT) 


  22 U/L 


  


  


 


 


Alanine Aminotransferase


(ALT/SGPT) 


  28 U/L 


  


  


 


 


Alkaline Phosphatase  91 U/L   


 


Troponin I  < 0.015 ng/ml   


 


Pro-B-Type Natriuretic Peptide  3508 pg/ml   


 


Total Protein  7.6 gm/dl   


 


Albumin  2.7 gm/dl   


 


Lipase  264 U/L   


 


Venous Blood pH   7.22  


 


Venous Blood Partial Pressure


CO2 


  


  85 mmHg 


  


 


 


Venous Blood Partial Pressure


O2 


  


  47 mmHg 


  


 


 


Venous Blood HCO3   34 mmol/L  


 


Venous Blood Oxygen Saturation   74.5 %  


 


Venous Blood Base Excess   3.7 mEq/L  


 


Lactic Acid Level    1.1 mmol/L 


 


Test


  1/1/18


23:20 1/1/18


23:35 1/2/18


03:59 1/2/18


06:32


 


Urine Color YELLOW    


 


Urine Appearance CLOUDY    


 


Urine pH 5.0    


 


Urine Specific Gravity 1.025    


 


Urine Protein 2+    


 


Urine Glucose (UA) 2+    


 


Urine Ketones NEG    


 


Urine Occult Blood NEG    


 


Urine Nitrite NEG    


 


Urine Bilirubin NEG    


 


Urine Urobilinogen NEG    


 


Urine Leukocyte Esterase TRACE    


 


Urine WBC (Auto) 10-30 /hpf    


 


Urine RBC (Auto) 0-4 /hpf    


 


Urine Hyaline Casts (Auto) 5-10 /lpf    


 


Urine Epithelial Cells (Auto) >30 /lpf    


 


Urine Bacteria (Auto) 2+    


 


Urine Renal Epithelial Cells 0-5 /lpf    


 


Urine Pathogenic Casts


  1-5 GRANULAR


CASTS /lpf 


  


  


 


 


Urine Yeast (Auto)     


 


Urine Random Sodium 23 mEq/L    


 


Arterial Blood pH  7.28   7.31 


 


Arterial Blood Partial


Pressure CO2 


  71 mmHg 


  


  65 mmHg 


 


 


Arterial Blood Partial


Pressure O2 


  103 mm/Hg 


  


  73 mm/Hg 


 


 


Arterial Blood HCO3  33 mmol/L   32 mmol/L 


 


Arterial Blood Oxygen


Saturation 


  95.7 % 


  


  91.0 % 


 


 


Arterial Blood Base Excess  4.4 mEq/L   4.5 mEq/L 


 


Arterial Blood Gas Delivery  70%   10L 


 


Alberto Test  POS   POS 


 


White Blood Count   10.19 K/uL  


 


Red Blood Count   3.20 M/uL  


 


Hemoglobin   9.4 g/dL  


 


Hematocrit   29.3 %  


 


Mean Corpuscular Volume   91.6 fL  


 


Mean Corpuscular Hemoglobin   29.4 pg  


 


Mean Corpuscular Hemoglobin


Concent 


  


  32.1 g/dl 


  


 


 


Platelet Count   259 K/uL  


 


Mean Platelet Volume   9.6 fL  


 


Neutrophils (%) (Auto)   83.0 %  


 


Lymphocytes (%) (Auto)   4.5 %  


 


Monocytes (%) (Auto)   12.0 %  


 


Eosinophils (%) (Auto)   0.0 %  


 


Basophils (%) (Auto)   0.1 %  


 


Neutrophils # (Auto)   8.46 K/uL  


 


Lymphocytes # (Auto)   0.46 K/uL  


 


Monocytes # (Auto)   1.22 K/uL  


 


Eosinophils # (Auto)   0.00 K/uL  


 


Basophils # (Auto)   0.01 K/uL  


 


RDW Standard Deviation   46.5 fL  


 


RDW Coefficient of Variation   13.8 %  


 


Immature Granulocyte % (Auto)   0.4 %  


 


Immature Granulocyte # (Auto)   0.04 K/uL  


 


Sodium Level   129 mmol/L  


 


Potassium Level   5.5 mmol/L  


 


Chloride Level   95 mmol/L  


 


Carbon Dioxide Level   34 mmol/L  


 


Anion Gap   0.0 mmol/L  


 


Blood Urea Nitrogen   23 mg/dl  


 


Creatinine   0.81 mg/dl  


 


Est Creatinine Clear Calc


Drug Dose 


  


  64.3 ml/min 


  


 


 


Estimated GFR (


American) 


  


  78.4 


  


 


 


Estimated GFR (Non-


American 


  


  67.6 


  


 


 


BUN/Creatinine Ratio   28.4  


 


Random Glucose   216 mg/dl  


 


Calcium Level   8.3 mg/dl  


 


Test


  1/2/18


07:43 


  


  


 


 


Sodium Level 131 mmol/L    














 Date/Time


Source Procedure


Growth Status


 


 


 1/1/18 18:31


Blood Blood Culture


Pending Received


 


 1/1/18 18:22


Blood Blood Culture


Pending Received


 


 1/1/18 23:20


Urine,Catheterized Urine Culture


Pending Received











Assessment & Plan


Acute hypercapnic respiratory failure 


Mostly related to COPD exacerbation vs Pneumonia


CXR showed dense bibasilar airspace consolidation, right greater than left with 

associated pleural effusions.


ABG on admission showed respiratory acidosis with comp


On abx with Levaquin and Zosyn


Negative influenza test 


Continue Solumedrol


Continue supplement oxygen and intermittent BIPAP as tolerated


Continue respiratory treatment with Duoneb


Blood cx pending


Check sputum cx


monitor closely








Hyponatremia, 


Possible related to hypovolemia  


Na on admission 128


Na today 131


On IVF with NS, will monitor closely





Hyperkalemia


K 5.5


Check BMP later


If stay high, will give Kayexalate





Left Ankle fracture s/p fall 


Left ankle xray showed distal tibial and fibular fractures as above with mild 

lateral subluxation of the talus.


Ortho on board


pain control


On 10L oxygen now, high risk for surgery for now


Will reassess her tomorrow for her respiratory status  


Pulmonology on board





Right pelvic ring fracture, 


Pelvis xray showed an age indeterminant right pubic ring fracture, possibly 

chronic.


Stable








Hypertension


BP elevated 


Continue lisinopril.  


PRN clonidine


Continue monitor BP








Ulcerative colitis


On Pentasa


Stable





Depression


On Celexa


Stable





DVT px 


On heparin.  





Code DNR as per my discussion with Pt


Current Inpatient Medications:





Current Inpatient Medications








 Medications


  (Trade)  Dose


 Ordered  Sig/Donna


 Route  Start Time


 Stop Time Status Last Admin


Dose Admin


 


 Levalbuterol


  (Xopenex 1.25MG/


 3ML Neb)  1.25 mg  Q6R


 INH  1/1/18 22:30


 1/31/18 22:29  1/2/18 07:04


1.25 MG


 


 Methylprednisolone


 Sodium Succinate


 40 mg/Syringe  0.64 ml @ 


 1.5 mls/min  Q6H


 IV  1/2/18 02:00


 2/1/18 01:59  1/2/18 07:56


1.5 MLS/MIN


 


 Levofloxacin


  (Consult)  1 ea  UD  PRN


 N/A  1/2/18 01:09


 2/1/18 01:08   


 


 


 Tramadol HCl


  (Ultram Tab)  50 mg  Q6H  PRN


 PO  1/1/18 21:30


 1/31/18 21:29  1/2/18 07:55


50 MG


 


 Clonidine HCl


  (Catapres Tab)  0.1 mg  Q6H  PRN


 PO  1/1/18 21:30


 1/31/18 21:29   


 


 


 Citalopram


 Hydrobromide


  (celeXA TAB)  40 mg  DAILY


 PO  1/2/18 09:00


 2/1/18 08:59  1/2/18 07:57


40 MG


 


 Latanoprost


  (Xalatan Oph


 Soln)  1 drops  HS


 OP  1/2/18 21:00


 2/1/18 20:59   


 


 


 Lisinopril


  (Zestril Tab)  20 mg  DAILY


 PO  1/2/18 09:00


 2/1/18 08:59  1/2/18 07:57


20 MG


 


 Mesalamine


  (Pentasa


 Controlled Rel


 Cap)  1,000 mg  BID


 PO  1/2/18 09:00


 2/1/18 08:59  1/2/18 07:56


1,000 MG


 


 Mirtazapine


  (Remeron Tab)  15 mg  HS


 PO  1/2/18 21:00


 2/1/18 20:59   


 


 


 Pantoprazole


 Sodium


  (Protonix Tab)  40 mg  DAILY


 PO  1/2/18 09:00


 2/1/18 08:59  1/2/18 07:57


40 MG


 


 Senna/Docusate


 Sodium


  (Senokot S Tab)  1 tab  DAILY


 PO  1/2/18 09:00


 2/1/18 08:59  1/2/18 07:59


1 TAB


 


 Simvastatin


  (Zocor Tab)  20 mg  HS


 PO  1/2/18 21:00


 2/1/18 20:59   


 


 


 Heparin Sodium


  (Porcine)


  (Heparin Sq 5000


 Unit/0.5ml)  5,000 unit  Q12H


 SQ  1/2/18 09:00


 2/1/18 08:59  1/2/18 07:57


5,000 UNIT


 


 Sodium Chloride  1,000 ml @ 


 100 mls/hr  Q10H


 IV  1/1/18 21:39


 1/31/18 21:38  1/2/18 01:31


100 MLS/HR


 


 Acetaminophen


  (Tylenol Tab)  650 mg  Q4H  PRN


 PO  1/1/18 21:45


 1/31/18 21:44   


 


 


 Ondansetron HCl


  (Zofran Inj)  4 mg  Q6H  PRN


 IV  1/1/18 21:45


 1/31/18 21:44   


 


 


 Piperacillin Sod/


 Tazobactam Sod


 4.5 gm/Dextrose  120 ml @ 


 30 mls/hr  Q8H


 IV  1/2/18 02:00


 1/9/18 01:59  1/2/18 10:00


30 MLS/HR


 


 Levofloxacin 750


 mg/Prmx  150 ml @ 


 100 mls/hr  Q24H


 IV  1/2/18 06:00


 1/9/18 05:59  1/2/18 05:51


100 MLS/HR


 


 Piperacillin Sod/


 Tazobactam Sod


  (Consult)  1 ea  UD  PRN


 N/A  1/2/18 01:15


 2/1/18 01:14

## 2018-01-02 NOTE — CONSULTATION REPORT
DATE OF CONSULTATION:  01/02/2018

 

DATE OF CONSULTATION:  01/02/2018  

 

REASON FOR CONSULT:  Left ankle fracture.

 

HISTORY OF PRESENT ILLNESS:  The patient is an 82-year-old white female who

was admitted by [facility] yesterday for acute hypercapnic

respiratory failure secondary to chronic obstructive pulmonary disease

exacerbation and question of pneumonia.  Prior to coming in the patient was

having increasing shortness of breath over the last several days.  She had

fallen approximately 2-3 days ago and was still continuing to try to walk on

her foot and was having left foot pain and ankle pain.  She apparently had 1

or 2 more falls since that time because of losing her balance.  She came to

the Emergency Room and then was admitted but x-rays were taken of the left

ankle and it was found that she had a  bimalleolar ankle fracture of the 

left ankle.  It was splinted and we have now been consulted to see her for

her ankle fracture.

 

PAST MEDICAL HISTORY:  COPD with oxygen use at home 2 liters during the day

and 4 liters at nighttime, hypertension, ulcerative colitis, depression,

dyslipidemia, status post CEA.

 

PAST SURGICAL HISTORY:  Colonoscopy, EGD, total hip replacement on the right,

hysterectomy.

 

MEDICATIONS:  Combivent 4 times daily, lisinopril 20 mg p.o. daily, Lasix 20

mg p.o. daily, aspirin 81 mg p.o. daily, Pentasa  1000 mg p.o. b.i.d., Celexa

40 mg p.o. daily, mirtazapine 50 mg at bedtime, simvastatin 20 mg p.o. daily,

Protonix 40 mg p.o. daily.

 

FAMILY AND SOCIAL HISTORY:  As per admitting history and physical.

 

ALLERGIES:  SULFA DRUGS, SULFAMETHOXAZOLE AND TRIMETHOPRIM.

 

REVIEW OF SYSTEMS:  As per admitting history and physical.

 

PHYSICAL EXAMINATION:

GENERAL:  The patient is an elderly white female who appears her stated age. 

She is sitting up in bed and has just finished eating her breakfast.  She has

a Ventimask on and appears to be mildly short of breath.  She is alert and

oriented to person and place and answers questions appropriately.

EXTREMITIES:  On examination of her left leg, she  has a posterior splint

applied to the left lower extremity from the left ankle to the knee.  Toes

are pink and warm and she is able to move the toes well at this time.  She

has some slight decreased sensation in the toes compared to the right. 

Splint is left on during whole exam.  She is nontender at the left knee and

left hip.



LLE NVSI +EHL/FHL, SILT grossly, CR< 2 seconds.  Splint intact. 

 

ASSESSMENT:  Bimalleolar ankle fracture, left ankle.

 

PLAN:  The patient will need ORIF of the ankle; however, with her current

status we will have to wait to see if she will be cleared for surgery by

medicine service.  I have contacted Dr. Hendrix and discussed the case with

him and with her continually needing 10 liters of O2 on Ventimask at this

time and also eating breakfast we will plan on holding on surgery until she

has been cleared by the medicine service.

 

-NWB LLE

-Ice/elevate LLE

-Maintain splint

-Pain control

-Plan for ORIF left ankle once medically cleared

 

ÁNGEL

## 2018-01-02 NOTE — PHARMACY PROGRESS NOTE
Glycemic Control: Initial Note


Date of Service


Jan 2, 2018.





Scope


Glycemic Pharmacist to provide recommendations to improve glycemic control (all 

ICU patients are screened for hyperglycemia and treatment recommendations are 

provided per protocol).





Pt identified with hyperglycemia (BSG above 180) [place] [facility] [place]

[facility]





Subjective


The patient is a 82 year old female admitted on Jan 1, 2018 at 21:03 for 

hypercapnic resp failure secondary to COPD exac / pneumonia as well as fall w/ 

L ankle and R pelvic ring fx's.  Patient's past medical history IS NOT 

significant for diabetes mellitus





Objective


Height (Feet):  5


Height (Inches):  6.00


Weight (Kilograms):  103.200


Accuchecks BSG (last 24hrs):











Test


  1/1/18


18:22 1/2/18


03:59


 


Random Glucose


  270 mg/dl


(70-99) 216 mg/dl


(70-99)








Laboratory Data (last 24hrs)











Test


  1/1/18


18:22 1/2/18


03:59 1/2/18


07:43


 


Anion Gap 2.0 mmol/L  0.0 mmol/L  


 


BUN/Creatinine Ratio 26.6  28.4  


 


Blood Urea Nitrogen 24 mg/dl  23 mg/dl  


 


Creatinine 0.89 mg/dl  0.81 mg/dl  


 


Potassium Level 5.0 mmol/L  5.5 mmol/L  


 


Sodium Level 128 mmol/L  129 mmol/L  131 mmol/L 


 


White Blood Count 14.09 K/uL  10.19 K/uL  


 


Red Blood Count 3.72 M/uL  3.20 M/uL  


 


Hemoglobin 11.3 g/dL  9.4 g/dL  


 


Hematocrit 34.3 %  29.3 %  


 


Mean Corpuscular Volume 92.2 fL  91.6 fL  


 


Mean Corpuscular Hemoglobin 30.4 pg  29.4 pg  


 


Mean Corpuscular Hemoglobin


Concent 32.9 g/dl 


  32.1 g/dl 


  


 


 


Platelet Count 309 K/uL  259 K/uL  


 


Mean Platelet Volume 9.7 fL  9.6 fL  


 


Neutrophils (%) (Auto) 80.7 %  83.0 %  


 


Lymphocytes (%) (Auto) 5.1 %  4.5 %  


 


Monocytes (%) (Auto) 13.7 %  12.0 %  


 


Eosinophils (%) (Auto) 0.0 %  0.0 %  


 


Basophils (%) (Auto) 0.1 %  0.1 %  


 


Neutrophils # (Auto) 11.37 K/uL  8.46 K/uL  


 


Lymphocytes # (Auto) 0.72 K/uL  0.46 K/uL  


 


Monocytes # (Auto) 1.93 K/uL  1.22 K/uL  


 


Eosinophils # (Auto) 0.00 K/uL  0.00 K/uL  


 


Basophils # (Auto) 0.01 K/uL  0.01 K/uL  











Recent Pertinent Medications


Outpatient Anti-diabetic Regimen: 


* No prior dx of DM  per current documentation








The patient is currently receiving:


* Basal Insulin:            Lantus -- units every -- hours





* Correctional Insulin:   Novolog Correction per scale ACHS


                          Goal Range: Low -- mg/dL - High -- mg/dL


                          Correction Factor: -- mg/dL/unit





* Prandial Insulin:         Per carb ratio of 1 unit per -- grams CHO consumed








* Oral Agents:              --








Risk Factors for Insulin Resistance:


* Steroids: Solu-medrol 40mg IV Q 6 hours


* Infection: receiving Zosyn + Levofloxacin for COPD exac / pneumonia


* Diet: currently ordered AHA diet





Assessment & Plan


ASSESSMENT:





1/2/18


* Patient with no prior dx of diabetes admitted for respiratory failure 

secondary to COPD exac / pneumonia


* She did have an elevated A1c of 6.4% back in 2011 and I do not see a repeat 

value


* Glu have been elevated on each PRP, 270 on 1st check and 216 on second


* She is receiving high dose steroids for COPD exac and this is likely leading 

to steroid-induced hyperglycemia


* Recommend initiating basal/bolus SQ regimen using weight and "moderate stress

" 














RECOMMEND:


* Lantus 13 units SQ BID - 1st dose now


* Novolog SQ ACHS


* Correction factor 20 mg/dl/unit


* Carb ratio 1 unit per 8 grams CHO consumed


* Goal range Low 110 mg/dL - High 140 mg/dL








Pharmacy will continue to provide recommendations in EMR while patient admitted 

to 1E/2E. Physicians may request pharmacy to continue to follow patient when 

transferred out of the ICU and/or consult pharmacy to write glycemic control 

orders








* Please note that the plan above was derived based on current level of insulin 

resistance and hospital stress. These recommendations are appropriate for 

inpatient admission only. Plan of care upon discharge will need to be 

reassessed to avoid potential outpatient hypo/hyperglycemia. 





Thank you.

## 2018-01-03 VITALS
HEART RATE: 89 BPM | OXYGEN SATURATION: 95 % | TEMPERATURE: 98.6 F | SYSTOLIC BLOOD PRESSURE: 135 MMHG | DIASTOLIC BLOOD PRESSURE: 80 MMHG

## 2018-01-03 VITALS — OXYGEN SATURATION: 93 % | HEART RATE: 106 BPM

## 2018-01-03 VITALS — OXYGEN SATURATION: 93 % | HEART RATE: 88 BPM

## 2018-01-03 VITALS — OXYGEN SATURATION: 93 %

## 2018-01-03 VITALS
TEMPERATURE: 98.42 F | HEART RATE: 95 BPM | SYSTOLIC BLOOD PRESSURE: 159 MMHG | DIASTOLIC BLOOD PRESSURE: 108 MMHG | OXYGEN SATURATION: 92 %

## 2018-01-03 VITALS — OXYGEN SATURATION: 92 % | HEART RATE: 94 BPM

## 2018-01-03 VITALS
SYSTOLIC BLOOD PRESSURE: 154 MMHG | HEART RATE: 92 BPM | TEMPERATURE: 97.88 F | DIASTOLIC BLOOD PRESSURE: 76 MMHG | OXYGEN SATURATION: 95 %

## 2018-01-03 VITALS
HEART RATE: 102 BPM | SYSTOLIC BLOOD PRESSURE: 177 MMHG | OXYGEN SATURATION: 90 % | DIASTOLIC BLOOD PRESSURE: 101 MMHG | TEMPERATURE: 98.96 F

## 2018-01-03 VITALS
OXYGEN SATURATION: 93 % | DIASTOLIC BLOOD PRESSURE: 73 MMHG | TEMPERATURE: 97.7 F | HEART RATE: 94 BPM | SYSTOLIC BLOOD PRESSURE: 151 MMHG

## 2018-01-03 VITALS
HEART RATE: 72 BPM | TEMPERATURE: 97.88 F | DIASTOLIC BLOOD PRESSURE: 84 MMHG | SYSTOLIC BLOOD PRESSURE: 157 MMHG | OXYGEN SATURATION: 94 %

## 2018-01-03 VITALS — HEART RATE: 68 BPM | OXYGEN SATURATION: 92 %

## 2018-01-03 VITALS
OXYGEN SATURATION: 91 % | SYSTOLIC BLOOD PRESSURE: 165 MMHG | DIASTOLIC BLOOD PRESSURE: 82 MMHG | HEART RATE: 93 BPM | TEMPERATURE: 97.88 F

## 2018-01-03 VITALS — OXYGEN SATURATION: 90 % | HEART RATE: 93 BPM

## 2018-01-03 VITALS — OXYGEN SATURATION: 92 %

## 2018-01-03 VITALS — OXYGEN SATURATION: 91 % | HEART RATE: 92 BPM

## 2018-01-03 LAB
BASOPHILS # BLD: 0.01 K/UL (ref 0–0.2)
BASOPHILS NFR BLD: 0.1 %
BUN SERPL-MCNC: 25 MG/DL (ref 7–18)
CALCIUM SERPL-MCNC: 8.6 MG/DL (ref 8.5–10.1)
CO2 SERPL-SCNC: 33 MMOL/L (ref 21–32)
CREAT SERPL-MCNC: 0.71 MG/DL (ref 0.6–1.2)
EOS ABS #: 0 K/UL (ref 0–0.5)
EOSINOPHIL NFR BLD AUTO: 296 K/UL (ref 130–400)
GLUCOSE SERPL-MCNC: 174 MG/DL (ref 70–99)
HCT VFR BLD CALC: 30.5 % (ref 37–47)
HGB BLD-MCNC: 9.7 G/DL (ref 12–16)
IG#: 0.09 K/UL (ref 0–0.02)
IMM GRANULOCYTES NFR BLD AUTO: 7.4 %
LYMPHOCYTES # BLD: 0.76 K/UL (ref 1.2–3.4)
MCH RBC QN AUTO: 29.7 PG (ref 25–34)
MCHC RBC AUTO-ENTMCNC: 31.8 G/DL (ref 32–36)
MCV RBC AUTO: 93.3 FL (ref 80–100)
MONO ABS #: 0.92 K/UL (ref 0.11–0.59)
MONOCYTES NFR BLD: 8.9 %
NEUT ABS #: 8.56 K/UL (ref 1.4–6.5)
NEUTROPHILS # BLD AUTO: 0 %
NEUTROPHILS NFR BLD AUTO: 82.7 %
PMV BLD AUTO: 9.4 FL (ref 7.4–10.4)
POTASSIUM SERPL-SCNC: 5.1 MMOL/L (ref 3.5–5.1)
RED CELL DISTRIBUTION WIDTH CV: 14 % (ref 11.5–14.5)
RED CELL DISTRIBUTION WIDTH SD: 47.6 FL (ref 36.4–46.3)
SODIUM SERPL-SCNC: 132 MMOL/L (ref 136–145)
WBC # BLD AUTO: 10.34 K/UL (ref 4.8–10.8)

## 2018-01-03 RX ADMIN — SIMVASTATIN SCH MG: 20 TABLET, FILM COATED ORAL at 20:43

## 2018-01-03 RX ADMIN — LEVALBUTEROL HYDROCHLORIDE SCH MG: 1.25 SOLUTION RESPIRATORY (INHALATION) at 07:08

## 2018-01-03 RX ADMIN — METHYLPREDNISOLONE SODIUM SUCCINATE SCH MLS/MIN: 1 INJECTION, POWDER, FOR SOLUTION INTRAMUSCULAR; INTRAVENOUS at 20:41

## 2018-01-03 RX ADMIN — LEVALBUTEROL HYDROCHLORIDE SCH MG: 1.25 SOLUTION RESPIRATORY (INHALATION) at 19:20

## 2018-01-03 RX ADMIN — PIPERACILLIN SODIUM, TAZOBACTAM SODIUM SCH MLS/HR: 4; .5 INJECTION, POWDER, LYOPHILIZED, FOR SOLUTION INTRAVENOUS at 10:17

## 2018-01-03 RX ADMIN — PANTOPRAZOLE SCH MG: 40 TABLET, DELAYED RELEASE ORAL at 10:18

## 2018-01-03 RX ADMIN — HEPARIN SODIUM SCH UNIT: 10000 INJECTION, SOLUTION INTRAVENOUS; SUBCUTANEOUS at 20:44

## 2018-01-03 RX ADMIN — HEPARIN SODIUM SCH UNIT: 10000 INJECTION, SOLUTION INTRAVENOUS; SUBCUTANEOUS at 10:17

## 2018-01-03 RX ADMIN — HEPARIN SODIUM SCH UNIT: 10000 INJECTION, SOLUTION INTRAVENOUS; SUBCUTANEOUS at 05:35

## 2018-01-03 RX ADMIN — METHYLPREDNISOLONE SODIUM SUCCINATE SCH MLS/MIN: 1 INJECTION, POWDER, FOR SOLUTION INTRAMUSCULAR; INTRAVENOUS at 02:19

## 2018-01-03 RX ADMIN — SODIUM CHLORIDE SCH MLS/HR: 900 INJECTION, SOLUTION INTRAVENOUS at 20:42

## 2018-01-03 RX ADMIN — PIPERACILLIN SODIUM, TAZOBACTAM SODIUM SCH MLS/HR: 4; .5 INJECTION, POWDER, LYOPHILIZED, FOR SOLUTION INTRAVENOUS at 02:19

## 2018-01-03 RX ADMIN — STANDARDIZED SENNA CONCENTRATE AND DOCUSATE SODIUM SCH TAB: 8.6; 5 TABLET ORAL at 10:18

## 2018-01-03 RX ADMIN — SODIUM CHLORIDE SCH MLS/HR: 900 INJECTION, SOLUTION INTRAVENOUS at 02:19

## 2018-01-03 RX ADMIN — MESALAMINE SCH MG: 250 CAPSULE ORAL at 10:18

## 2018-01-03 RX ADMIN — LISINOPRIL SCH MG: 20 TABLET ORAL at 10:18

## 2018-01-03 RX ADMIN — CLONIDINE HYDROCHLORIDE PRN MG: 0.1 TABLET ORAL at 16:21

## 2018-01-03 RX ADMIN — METHYLPREDNISOLONE SODIUM SUCCINATE SCH MLS/MIN: 1 INJECTION, POWDER, FOR SOLUTION INTRAMUSCULAR; INTRAVENOUS at 14:49

## 2018-01-03 RX ADMIN — LATANOPROST SCH DROPS: 50 SOLUTION/ DROPS OPHTHALMIC at 20:42

## 2018-01-03 RX ADMIN — METHYLPREDNISOLONE SODIUM SUCCINATE SCH MLS/MIN: 1 INJECTION, POWDER, FOR SOLUTION INTRAMUSCULAR; INTRAVENOUS at 08:29

## 2018-01-03 RX ADMIN — LEVOFLOXACIN SCH MLS/HR: 5 INJECTION, SOLUTION INTRAVENOUS at 05:38

## 2018-01-03 RX ADMIN — LEVALBUTEROL HYDROCHLORIDE SCH MG: 1.25 SOLUTION RESPIRATORY (INHALATION) at 14:38

## 2018-01-03 RX ADMIN — PIPERACILLIN SODIUM, TAZOBACTAM SODIUM SCH MLS/HR: 4; .5 INJECTION, POWDER, LYOPHILIZED, FOR SOLUTION INTRAVENOUS at 17:45

## 2018-01-03 RX ADMIN — MIRTAZAPINE SCH MG: 15 TABLET, FILM COATED ORAL at 20:43

## 2018-01-03 RX ADMIN — LEVALBUTEROL HYDROCHLORIDE SCH MG: 1.25 SOLUTION RESPIRATORY (INHALATION) at 01:57

## 2018-01-03 RX ADMIN — SODIUM CHLORIDE SCH MLS/HR: 900 INJECTION, SOLUTION INTRAVENOUS at 16:22

## 2018-01-03 RX ADMIN — CITALOPRAM HYDROBROMIDE SCH MG: 40 TABLET ORAL at 10:18

## 2018-01-03 RX ADMIN — MESALAMINE SCH MG: 250 CAPSULE ORAL at 20:43

## 2018-01-03 RX ADMIN — ACETAMINOPHEN PRN MG: 325 TABLET ORAL at 16:22

## 2018-01-03 NOTE — DIAGNOSTIC IMAGING REPORT
CT ANGIOGRAM OF THE CHEST



CLINICAL HISTORY: Respiratory failure. Possible acute pulmonary embolism.    



COMPARISON STUDY:  September 2, 2011 , chest x-ray dated 1/1/2018



TECHNIQUE: Following the IV administration of 118 mL of Optiray-320, CT

angiogram of the thorax was performed from the thoracic inlet to the lung bases

utilizing the pulmonary embolus protocol. Images are reviewed in the axial,

sagittal, and coronal planes. IV contrast was administered without complication.

MIP imaging was performed.  A dose lowering technique was utilized adhering to

the principles of ALARA.





CT DOSE: 758.14 mGy.cm



FINDINGS:



No pathologically enlarged axillary mediastinal or hilar lymph nodes were

visualized.



There is dilatation of the thoracic aorta at the thoracoabdominal junction (48

mm). There is also a partially visualized abdominal aortic aneurysm. Moderately

extensive atheromatous changes are present within the thoracic and abdominal

aorta.



The study is compromised due to respiratory motion artifact. There are no

pulmonary artery filling defects to indicate acute pulmonary embolism.



There are small pleural effusions.



There is dense bilateral lower lobe atelectasis/consolidation. Patchy airspace

opacities are also present within the right upper lobe, lingula and right middle

lobe. There are mild associated bronchiectatic changes. There is bilateral lower

lobe mucous plugging. A bilateral pneumonia is suspected.



There is a partially visualized left lobe hepatic hypodensity, present on the

prior study and likely representing a cyst.



IMPRESSION:  

1. Study compromised by respiratory motion artifact

2. No evidence of acute pulmonary embolism

3. No evidence of pathologic adenopathy

4. 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

5. Dense bilateral lower lobe atelectasis/consolidation. Patchy airspace

opacities are also present within the right upper lobe lingula and right middle

lobe.

6. Small bilateral pleural effusions

7. Severe L1 compression fracture with mild retropulsion.







Electronically signed by:  Clarence Richmond M.D.

1/3/2018 12:01 PM



Dictated Date/Time:  1/3/2018 11:54 AM

## 2018-01-03 NOTE — PROGRESS NOTE
Medicine Progress Note


Date & Time of Visit:


Reji 3, 2018 at 19:22.


Subjective


Pt was seen and examined


Lying in with mild respiratory distress


Continue to require BIPAP 


Pt slept well last night


Denies any chest pain and palpitation





Objective





Last 8 Hrs








  Date Time  Temp Pulse Resp B/P (MAP) Pulse Ox O2 Delivery O2 Flow Rate FiO2


 


1/3/18 15:38 37.2 102 24 177/101 (126) 90 High Flow Oxygen  


 


1/3/18 14:39  88 18  93 Mask 9.0 


 


1/3/18 12:00 36.5 94 18 151/73 (99) 93 BiPAP  


 


1/3/18 12:00      Oxymask 10.0 








Physical Exam:


General- No acute distress


Head-  atraumatic


Eyes- PERRL, EOMI


ENT- oropharynx clear


Neck- supple, no JVD


Lungs- coarse BS 


Heart- regular rhythm; no murmur


Abdomen- normal bowel sounds, soft


Extremities- no calf tenderness, Left ankle pain, splint with ace bandage in LE


Neuro- alert, oriented x 3; PERRL, EOMI


Skin- warm & dry


Laboratory Results:





Last 24 Hours








Test


  1/3/18


05:33 1/3/18


11:58


 


White Blood Count 10.34 K/uL  


 


Red Blood Count 3.27 M/uL  


 


Hemoglobin 9.7 g/dL  


 


Hematocrit 30.5 %  


 


Mean Corpuscular Volume 93.3 fL  


 


Mean Corpuscular Hemoglobin 29.7 pg  


 


Mean Corpuscular Hemoglobin


Concent 31.8 g/dl 


  


 


 


Platelet Count 296 K/uL  


 


Mean Platelet Volume 9.4 fL  


 


Neutrophils (%) (Auto) 82.7 %  


 


Lymphocytes (%) (Auto) 7.4 %  


 


Monocytes (%) (Auto) 8.9 %  


 


Eosinophils (%) (Auto) 0.0 %  


 


Basophils (%) (Auto) 0.1 %  


 


Neutrophils # (Auto) 8.56 K/uL  


 


Lymphocytes # (Auto) 0.76 K/uL  


 


Monocytes # (Auto) 0.92 K/uL  


 


Eosinophils # (Auto) 0.00 K/uL  


 


Basophils # (Auto) 0.01 K/uL  


 


RDW Standard Deviation 47.6 fL  


 


RDW Coefficient of Variation 14.0 %  


 


Immature Granulocyte % (Auto) 0.9 %  


 


Immature Granulocyte # (Auto) 0.09 K/uL  


 


Sodium Level 132 mmol/L  


 


Potassium Level 5.1 mmol/L  


 


Chloride Level 99 mmol/L  


 


Carbon Dioxide Level 33 mmol/L  


 


Anion Gap 0.0 mmol/L  


 


Blood Urea Nitrogen 25 mg/dl  


 


Creatinine 0.71 mg/dl  


 


Est Creatinine Clear Calc


Drug Dose 73.7 ml/min 


  


 


 


Estimated GFR (


American) 91.9 


  


 


 


Estimated GFR (Non-


American 79.3 


  


 


 


BUN/Creatinine Ratio 35.8  


 


Random Glucose 174 mg/dl  


 


Calcium Level 8.6 mg/dl  


 


Arterial Blood pH  7.20 


 


Arterial Blood Partial


Pressure CO2 


  76 mmHg 


 


 


Arterial Blood Partial


Pressure O2 


  67 mm/Hg 


 


 


Arterial Blood HCO3  29 mmol/L 


 


Arterial Blood Oxygen


Saturation 


  88.7 % 


 


 


Arterial Blood Base Excess  0.1 mEq/L 


 


Arterial Blood Gas Delivery  10L 


 


Alberto Test  POS 











Assessment & Plan


Acute hypercapnic respiratory failure 


Mostly related to COPD exacerbation vs Pneumonia


CXR showed dense bibasilar airspace consolidation, right greater than left with 

associated pleural effusions.


ABG on admission showed respiratory acidosis with comp


On abx with Levaquin and Zosyn


Negative influenza test 


Continue Solumedrol


Continue supplement oxygen and intermittent BIPAP as tolerated


Continue respiratory treatment with Duoneb


Blood cx no growth


monitor closely


CT with PE protocol showed no evidence for PE. Dense bilateral lower lobe 

atelectasis/consolidation. Patchy airspace


opacities are also present within the right upper lobe lingula and right middle 

lobe.


ECHO


* The echocardiogram is extremely technically limited.


* Patient was sitting upright for the test.


* There is no significant pericardial effusion noted.


* The left ventricular systolic function is grossly normal on limited 

visualization.


* The right ventricular systolic function is grossly normal.


* The valves are poorly visualized.


* There is no significant valvular stenosis or regurgitation on technically 

limited Doppler evaluation.





Hyponatremia


Possible related to hypovolemia  


Na on admission 128


Na today 132


On IVF with NS, will monitor closely





Hyperkalemia


K 5.1


If stay high, will give Kayexalate


Continue monitor BMP





Left Ankle fracture s/p fall 


Left ankle xray showed distal tibial and fibular fractures as above with mild 

lateral subluxation of the talus.


Ortho on board


pain control


On 10L oxygen now, high risk for surgery for now


Will need to postpone surgery until respiratory status improves





Right pelvic ring fracture, 


Pelvis xray showed an age indeterminant right pubic ring fracture, possibly 

chronic.


Stable








Hypertension


BP elevated 


Continue lisinopril.  


PRN clonidine


Continue monitor BP








Ulcerative colitis


On Pentasa


Stable





Depression


On Celexa


Stable





DVT px 


On heparin.  





Code DNR as per my discussion with Pt





CODE STATUS


Continue monitor in tele


Current Inpatient Medications:





Current Inpatient Medications








 Medications


  (Trade)  Dose


 Ordered  Sig/Donna


 Route  Start Time


 Stop Time Status Last Admin


Dose Admin


 


 Levalbuterol


  (Xopenex 1.25MG/


 3ML Neb)  1.25 mg  Q6R


 INH  1/1/18 22:30


 1/31/18 22:29  1/3/18 19:20


1.25 MG


 


 Methylprednisolone


 Sodium Succinate


 40 mg/Syringe  0.64 ml @ 


 1.5 mls/min  Q6H


 IV  1/2/18 02:00


 2/1/18 01:59  1/3/18 14:49


1.5 MLS/MIN


 


 Levofloxacin


  (Consult)  1 ea  UD  PRN


 N/A  1/2/18 01:09


 2/1/18 01:08   


 


 


 Tramadol HCl


  (Ultram Tab)  50 mg  Q6H  PRN


 PO  1/1/18 21:30


 1/31/18 21:29  1/2/18 07:55


50 MG


 


 Clonidine HCl


  (Catapres Tab)  0.1 mg  Q6H  PRN


 PO  1/1/18 21:30


 1/31/18 21:29  1/3/18 16:21


0.1 MG


 


 Citalopram


 Hydrobromide


  (celeXA TAB)  40 mg  DAILY


 PO  1/2/18 09:00


 2/1/18 08:59  1/3/18 10:18


40 MG


 


 Latanoprost


  (Xalatan Oph


 Soln)  1 drops  HS


 OP  1/2/18 21:00


 2/1/18 20:59  1/2/18 19:49


1 DROPS


 


 Lisinopril


  (Zestril Tab)  20 mg  DAILY


 PO  1/2/18 09:00


 2/1/18 08:59  1/3/18 10:18


20 MG


 


 Mesalamine


  (Pentasa


 Controlled Rel


 Cap)  1,000 mg  BID


 PO  1/2/18 09:00


 2/1/18 08:59  1/3/18 10:18


1,000 MG


 


 Mirtazapine


  (Remeron Tab)  15 mg  HS


 PO  1/2/18 21:00


 2/1/18 20:59  1/2/18 19:50


15 MG


 


 Pantoprazole


 Sodium


  (Protonix Tab)  40 mg  DAILY


 PO  1/2/18 09:00


 2/1/18 08:59  1/3/18 10:18


40 MG


 


 Senna/Docusate


 Sodium


  (Senokot S Tab)  1 tab  DAILY


 PO  1/2/18 09:00


 2/1/18 08:59  1/3/18 10:18


1 TAB


 


 Simvastatin


  (Zocor Tab)  20 mg  HS


 PO  1/2/18 21:00


 2/1/18 20:59  1/2/18 19:51


20 MG


 


 Heparin Sodium


  (Porcine)


  (Heparin Sq 5000


 Unit/0.5ml)  5,000 unit  Q12H


 SQ  1/2/18 09:00


 2/1/18 08:59  1/3/18 10:17


5,000 UNIT


 


 Sodium Chloride  1,000 ml @ 


 80 mls/hr  [id number]


 IV  1/1/18 21:39


 1/31/18 21:38  1/3/18 16:22


80 MLS/HR


 


 Acetaminophen


  (Tylenol Tab)  650 mg  Q4H  PRN


 PO  1/1/18 21:45


 1/31/18 21:44  1/3/18 16:22


650 MG


 


 Ondansetron HCl


  (Zofran Inj)  4 mg  Q6H  PRN


 IV  1/1/18 21:45


 1/31/18 21:44   


 


 


 Piperacillin Sod/


 Tazobactam Sod


 4.5 gm/Dextrose  120 ml @ 


 30 mls/hr  Q8H


 IV  1/2/18 02:00


 1/9/18 01:59  1/3/18 17:45


30 MLS/HR


 


 Levofloxacin 750


 mg/Prmx  150 ml @ 


 100 mls/hr  Q24H


 IV  1/2/18 06:00


 1/9/18 05:59  1/3/18 05:38


100 MLS/HR


 


 Piperacillin Sod/


 Tazobactam Sod


  (Consult)  1 ea  UD  PRN


 N/A  1/2/18 01:15


 2/1/18 01:14   


 


 


 Ioversol


  (Optiray 320)  100 ml  UD  PRN


 IV  1/3/18 11:00


 1/7/18 10:59

## 2018-01-03 NOTE — ECHOCARDIOGRAM REPORT
*NOTICE TO RECEIVING PARTY AGENCY**  This information is strictly Confidential and 
protected under [place] law.  [place] law prohibits you from making any further 
disclosure of this information unless further disclosure is expressly permitted by the 
written consent of the person to whom it pertains or is authorized by law.  A general 
authorization for the release of medical or other information is not sufficient for this 
purpose.  Hospital accepts no responsibility if the information is made available to any 
other person, INCLUDING THE PATIENT.



Interpretation Summary

  *  Name: LASHAWN WEST  Study Date: 2018 01:21 PM  BP: 154/76 mmHg

  *  MRN: [id number]  Patient Location: .2E\S\E203\S\1  HR: 92

  *  : 1935 (M/d/yyyy)  Gender: Female  Height: 66 in

  *  Age: 82 yrs  Ethnicity: [place]  Weight: 225 lb

  *  Ordering Physician: Ricardo Hendrix

  *  Referring Physician: Self, Referred

  *  Performed By: DENISE Warren RCS

  *  Accession# [id number]  Account# [id number]

  *  Reason For Study: CHF

  *  BSA: 2.1 m2

  *  -- Conclusions --

  *  The echocardiogram is extremely technically limited.

  *  Patient was sitting upright for the test.

  *  There is no significant pericardial effusion noted.

  *  The left ventricular systolic function is grossly normal on limited visualization.

  *  The right ventricular systolic function is grossly normal.

  *  The valves are poorly visualized.

  *  There is no significant valvular stenosis or regurgitation on technically limited 
Doppler evaluation.

Procedure Details

  *  A complete two-dimensional transthoracic echocardiogram was performed (2D, M-mode, 
Doppler and color flow Doppler).

  *  The study was technically difficult.

  *  The study was technically limited.

  *  There were technical limitations due to patient's poor acoustic windows secondary to 
severe lung disease and sitting upright position for imagining.

  *  A contrast injection of Definity was performed to improve assessment of LV function.

  *  Contrast was injected into an intravenous site in the left arm.

  *  One vial of Definity ultrasound contrast was diluted in normal saline to a total 
volume of 10 ml.  A total of '1.5' ml of solution was administered during imaging.

  *  Lot # [id number] of Definity utilized for procedure.

  *  Expiration date .

  *  The attending nurse who injected the contrast agent was JULIAN Parker RN.

Left Ventricle

  *  The left ventricular ejection fraction is grossly normal.

Right Ventricle

  *  The right ventricle is grossly normal size.

Pericardium/Pleural

  *  There is no pericardial effusion.



MMode 2D Measurements and Calculations

IVSd 1.2 cm

IVSs 1.5 cm



LVIDd 4.1 cm

LVIDs 3.3 cm

LVPWd 1.2 cm

LVPWs 1.6 cm



IVS/LVPW 0.97 

FS 21.6 %

EDV(Teich) 76.4 ml

ESV(Teich) 42.7 ml

EF(Teich) 44.1 %



EDV(cubed) 71.5 ml

ESV(cubed) 34.5 ml

EF(cubed) 51.8 %

% IVS thick 22.8 %

% LVPW thick 27.9 %





LV mass(C)d 179.8 grams

LV mass(C)dI 85.5 grams/m\S\2

LV mass(C)s 182.0 grams

LV mass(C)sI 86.5 grams/m\S\2



SV(Teich) 33.7 ml

SI(Teich) 16.0 ml/m\S\2

SV(cubed) 37.0 ml

SI(cubed) 17.6 ml/m\S\2



asc Aorta Diam 3.2 cm



EDV(MOD-sp4) 97.5 ml

ESV(MOD-sp4) 45.9 ml

EF(MOD-sp4) 52.9 %





EDV(MOD-sp2) 123.1 ml

ESV(MOD-sp2) 46.3 ml

EF(MOD-sp2) 62.4 %



SV(MOD-sp4) 51.6 ml

SI(MOD-sp4) 24.5 ml/m\S\2



SV(MOD-sp2) 76.8 ml

SI(MOD-sp2) 36.5 ml/m\S\2









Doppler Measurements and Calculations

MV E max tracy 96.6 cm/sec

MV A max tracy 121.2 cm/sec



MV E/A 0.80 



MV P1/2t max tracy 125.1 cm/sec

MV P1/2t 95.5 msec

MVA(P1/2t) 2.3 cm\S\2

MV dec slope 383.7 cm/sec\S\2

MV dec time 0.21 sec



Ao V2 max 126.8 cm/sec

Ao max PG 6.4 mmHg

Ao max PG (full) 2.9 mmHg





LV V1 max PG 3.5 mmHg



LV V1 max 94.0 cm/sec



PA V2 max 99.5 cm/sec

PA max PG 4.0 mmHg



TR max tracy 269.5 cm/sec

## 2018-01-03 NOTE — ANESTHESIOLOGY PROGRESS NOTE
Anesthesia Progress Note


Date of Service


Reji 3, 2018.





Progress Notes


I was asked to see Ms. Pittman as she has a broke left ankle that needs repair 

sometime this admission. She presented in acute hypercapnic respiratory failure/

COPD exacerbation/possible pneumonia. She does require oxygen at home (2L NC) 

but her respiratory status has worsened where her oxygen requirement has 

increased, she is tachypneic and she has required biPAP. Today she is on 10L 

oxymask but is still very much conversationally dyspneic. Pulmonary is 

following the patient and noted that she should have surgery delayed until her 

respiratory status at least gets back to her baseline. She is currently being 

treated with steroids, antibiotics and nebulizer therapy. I agree that surgery 

should be postponed until her respiratory status improves. Anesthesia will 

follow peripherally but if/when she is ok for surgery, we will re-examine 

patient to ensure she is fully optimized for the procedure. I also briefly 

spoke to her about anesthesia options and it appears that a SAB with peripheral 

nerve blocks would be ideal in terms of minimizing any further respiratory 

dysfunction. All questions were answered.

## 2018-01-03 NOTE — PULMONOLOGY PROGRESS NOTE
Pulmonary Progress Note


Date of Service


Reji 3, 2018.





Attending


Dr. Fuentes





Subjective


Patient seen and examined at bedside this morning.


Appears to be in respiratory distress. Tachypneic. 


Able to speak in 2 word sentences. 


She is alert and states that she is not doing well.


She is still having some pain in left lower extremity.





She was on BIPAP for most of day yesterday, however currently on 10L oxymask 

and tachypneic to 40s.





Objective


VS reviewed. BP 37, //82, P , RR 18-22, SaO2 95% on 10 L/min. 

1.4 L + since admission


Gen: AAOx3, appears to be respiratory distress.


CVS: S1, S2, RRR


Lungs: decreased breath sound bilaterally, crackles at bases, tachypneic


Abd: soft/NT/NT/BS+


Ext: left lower extremity in splint, right lower extremity trace edema, chronic 

venous changes; no cyanosis, no clubbing





Labs reviewed. Imaging reviewed. Medications reviewed.





Assessment & Plan


Acute on chronic hypercapnic hypoxic respiratory failure


COPD (unknown FEV1) in acute exacerbation


Hypertensive urgency


Possible diastolic dysfunction


Electrolyte imbalance


Tobacco use disorder


Morbid obesity





Mrs. Pittman is a 82-year-old female who presents with acute on chronic 

hypercapnic respiratory failure with status post fall and sustaining left 

bimalleolar fracture.  She is on chronic long-term oxygen therapy at 2 L/m 

during the day and 4 L/m at night, for COPD and likely nocturnal hypoxemia 

secondary to possible obstructive sleep apnea.  





She is currently appears to be quite tachypneic this morning. I am concerned 

about pulmonary embolism in the setting of left ankle fracture and immobility. 








Recommendations


Patient's respiratory status is continues to be tenuous at this time. 


She was able to tolerate BiPAP overnight, but remains tachypneic without it. 


Recommend CT chest with contrast to rule of PE and TTE to evaluate heart 

function.


Continue with intermittent BiPAP every 4 hours for 24 hours. 





She is too unstable for surgery at this time and very high risk from a 

respiratory standpoint. Unfortunately, I feel that surgery will have to be 

postponed until she is more stable.


Continue to treat for COPD exacerbation with broad-spectrum antibiotics, 

nebulizers and IV corticosteroids.





Taper steroids as tolerated.  Encourage flutter valve and incentive spirometry.


Continue with DVT prophylaxis.





I will continue to follow. Discussed with Dr. Hendrix.





Data


Medications:





Current Inpatient Medications








 Medications


  (Trade)  Dose


 Ordered  Sig/Donna


 Route  Start Time


 Stop Time Status Last Admin


Dose Admin


 


 Levalbuterol


  (Xopenex 1.25MG/


 3ML Neb)  1.25 mg  Q6R


 INH  1/1/18 22:30


 1/31/18 22:29  1/3/18 07:08


1.25 MG


 


 Methylprednisolone


 Sodium Succinate


 40 mg/Syringe  0.64 ml @ 


 1.5 mls/min  Q6H


 IV  1/2/18 02:00


 2/1/18 01:59  1/3/18 02:19


1.5 MLS/MIN


 


 Levofloxacin


  (Consult)  1 ea  UD  PRN


 N/A  1/2/18 01:09


 2/1/18 01:08   


 


 


 Tramadol HCl


  (Ultram Tab)  50 mg  Q6H  PRN


 PO  1/1/18 21:30


 1/31/18 21:29  1/2/18 07:55


50 MG


 


 Clonidine HCl


  (Catapres Tab)  0.1 mg  Q6H  PRN


 PO  1/1/18 21:30


 1/31/18 21:29  1/2/18 19:48


0.1 MG


 


 Citalopram


 Hydrobromide


  (celeXA TAB)  40 mg  DAILY


 PO  1/2/18 09:00


 2/1/18 08:59  1/3/18 10:18


40 MG


 


 Latanoprost


  (Xalatan Oph


 Soln)  1 drops  HS


 OP  1/2/18 21:00


 2/1/18 20:59  1/2/18 19:49


1 DROPS


 


 Lisinopril


  (Zestril Tab)  20 mg  DAILY


 PO  1/2/18 09:00


 2/1/18 08:59  1/3/18 10:18


20 MG


 


 Mesalamine


  (Pentasa


 Controlled Rel


 Cap)  1,000 mg  BID


 PO  1/2/18 09:00


 2/1/18 08:59  1/3/18 10:18


1,000 MG


 


 Mirtazapine


  (Remeron Tab)  15 mg  HS


 PO  1/2/18 21:00


 2/1/18 20:59  1/2/18 19:50


15 MG


 


 Pantoprazole


 Sodium


  (Protonix Tab)  40 mg  DAILY


 PO  1/2/18 09:00


 2/1/18 08:59  1/3/18 10:18


40 MG


 


 Senna/Docusate


 Sodium


  (Senokot S Tab)  1 tab  DAILY


 PO  1/2/18 09:00


 2/1/18 08:59  1/3/18 10:18


1 TAB


 


 Simvastatin


  (Zocor Tab)  20 mg  HS


 PO  1/2/18 21:00


 2/1/18 20:59  1/2/18 19:51


20 MG


 


 Heparin Sodium


  (Porcine)


  (Heparin Sq 5000


 Unit/0.5ml)  5,000 unit  Q12H


 SQ  1/2/18 09:00


 2/1/18 08:59  1/3/18 10:17


5,000 UNIT


 


 Sodium Chloride  1,000 ml @ 


 80 mls/hr  [id number]


 IV  1/1/18 21:39


 1/31/18 21:38  1/3/18 02:19


80 MLS/HR


 


 Acetaminophen


  (Tylenol Tab)  650 mg  Q4H  PRN


 PO  1/1/18 21:45


 1/31/18 21:44   


 


 


 Ondansetron HCl


  (Zofran Inj)  4 mg  Q6H  PRN


 IV  1/1/18 21:45


 1/31/18 21:44   


 


 


 Piperacillin Sod/


 Tazobactam Sod


 4.5 gm/Dextrose  120 ml @ 


 30 mls/hr  Q8H


 IV  1/2/18 02:00


 1/9/18 01:59  1/3/18 10:17


30 MLS/HR


 


 Levofloxacin 750


 mg/Prmx  150 ml @ 


 100 mls/hr  Q24H


 IV  1/2/18 06:00


 1/9/18 05:59  1/3/18 05:38


100 MLS/HR


 


 Piperacillin Sod/


 Tazobactam Sod


  (Consult)  1 ea  UD  PRN


 N/A  1/2/18 01:15


 2/1/18 01:14   


 








Vital Signs:











  Date Time  Temp Pulse Resp B/P (MAP) Pulse Ox O2 Delivery O2 Flow Rate FiO2


 


1/3/18 08:05 36.6 92 18 154/76 (102) 95   


 


1/3/18 08:00      Oxymask 10.0 


 


1/3/18 07:08  88   93   50


 


1/3/18 07:08  88 18  93 BiPAP/CPAP  50


 


1/3/18 04:39 36.6 93 20 165/82 (109) 91 BiPAP  


 


1/3/18 04:00      Oxymask 10.0 


 


1/3/18 01:59  93 22  90 BiPAP/CPAP  50


 


1/3/18 01:57  106   93   50


 


1/3/18 00:15 37.0 89 22 135/80 (98) 95 Oxymask 10.0 


 


1/2/18 23:59      Oxymask 10.0 


 


1/2/18 20:06  106   93   50


 


1/2/18 20:00     92 Oxymask 10.0 


 


1/2/18 19:54 36.7 112 28 178/119 (138) 92 Oxymask 10.0 


 


1/2/18 19:09  97 28  94 Mask 9.0 


 


1/2/18 16:00      Oxymask 10.0 


 


1/2/18 15:20 36.8 104 26 139/69 (92) 93 Oxymask 9.0 


 


1/2/18 14:07  103 30  92 Mask 9.0 


 


1/2/18 12:19 36.4 104 34 147/93 (111) 90 Mask 10.0 


 


1/2/18 12:14  113   92   50


 


1/2/18 12:00      Oxymask 10.0 








Laboratory Results:





Last 24 Hours








Test


  1/2/18


15:13 1/3/18


05:33


 


Sodium Level 131 mmol/L  132 mmol/L 


 


Potassium Level 5.2 mmol/L  5.1 mmol/L 


 


Chloride Level 96 mmol/L  99 mmol/L 


 


Carbon Dioxide Level 32 mmol/L  33 mmol/L 


 


Anion Gap 3.0 mmol/L  0.0 mmol/L 


 


Blood Urea Nitrogen 23 mg/dl  25 mg/dl 


 


Creatinine 0.69 mg/dl  0.71 mg/dl 


 


Est Creatinine Clear Calc


Drug Dose 76.3 ml/min 


  73.7 ml/min 


 


 


Estimated GFR (


American) 94.0 


  91.9 


 


 


Estimated GFR (Non-


American 81.1 


  79.3 


 


 


BUN/Creatinine Ratio 33.1  35.8 


 


Random Glucose 134 mg/dl  174 mg/dl 


 


Calcium Level 8.6 mg/dl  8.6 mg/dl 


 


White Blood Count  10.34 K/uL 


 


Red Blood Count  3.27 M/uL 


 


Hemoglobin  9.7 g/dL 


 


Hematocrit  30.5 % 


 


Mean Corpuscular Volume  93.3 fL 


 


Mean Corpuscular Hemoglobin  29.7 pg 


 


Mean Corpuscular Hemoglobin


Concent 


  31.8 g/dl 


 


 


Platelet Count  296 K/uL 


 


Mean Platelet Volume  9.4 fL 


 


Neutrophils (%) (Auto)  82.7 % 


 


Lymphocytes (%) (Auto)  7.4 % 


 


Monocytes (%) (Auto)  8.9 % 


 


Eosinophils (%) (Auto)  0.0 % 


 


Basophils (%) (Auto)  0.1 % 


 


Neutrophils # (Auto)  8.56 K/uL 


 


Lymphocytes # (Auto)  0.76 K/uL 


 


Monocytes # (Auto)  0.92 K/uL 


 


Eosinophils # (Auto)  0.00 K/uL 


 


Basophils # (Auto)  0.01 K/uL 


 


RDW Standard Deviation  47.6 fL 


 


RDW Coefficient of Variation  14.0 % 


 


Immature Granulocyte % (Auto)  0.9 % 


 


Immature Granulocyte # (Auto)  0.09 K/uL

## 2018-01-04 VITALS — OXYGEN SATURATION: 91 % | HEART RATE: 100 BPM

## 2018-01-04 VITALS
HEART RATE: 102 BPM | OXYGEN SATURATION: 90 % | DIASTOLIC BLOOD PRESSURE: 76 MMHG | TEMPERATURE: 98.78 F | SYSTOLIC BLOOD PRESSURE: 168 MMHG

## 2018-01-04 VITALS — OXYGEN SATURATION: 91 % | HEART RATE: 93 BPM

## 2018-01-04 VITALS
SYSTOLIC BLOOD PRESSURE: 149 MMHG | OXYGEN SATURATION: 92 % | DIASTOLIC BLOOD PRESSURE: 74 MMHG | HEART RATE: 74 BPM | TEMPERATURE: 98.24 F

## 2018-01-04 VITALS
TEMPERATURE: 98.42 F | OXYGEN SATURATION: 93 % | HEART RATE: 99 BPM | DIASTOLIC BLOOD PRESSURE: 73 MMHG | SYSTOLIC BLOOD PRESSURE: 107 MMHG

## 2018-01-04 VITALS — OXYGEN SATURATION: 96 % | HEART RATE: 94 BPM

## 2018-01-04 VITALS
SYSTOLIC BLOOD PRESSURE: 150 MMHG | TEMPERATURE: 98.42 F | HEART RATE: 93 BPM | DIASTOLIC BLOOD PRESSURE: 72 MMHG | OXYGEN SATURATION: 93 %

## 2018-01-04 VITALS — OXYGEN SATURATION: 95 % | HEART RATE: 86 BPM

## 2018-01-04 VITALS — OXYGEN SATURATION: 92 %

## 2018-01-04 VITALS — HEART RATE: 80 BPM | OXYGEN SATURATION: 96 %

## 2018-01-04 VITALS
DIASTOLIC BLOOD PRESSURE: 79 MMHG | OXYGEN SATURATION: 96 % | TEMPERATURE: 97.88 F | HEART RATE: 94 BPM | SYSTOLIC BLOOD PRESSURE: 160 MMHG

## 2018-01-04 VITALS — OXYGEN SATURATION: 93 %

## 2018-01-04 VITALS
DIASTOLIC BLOOD PRESSURE: 97 MMHG | HEART RATE: 95 BPM | TEMPERATURE: 98.78 F | OXYGEN SATURATION: 91 % | SYSTOLIC BLOOD PRESSURE: 157 MMHG

## 2018-01-04 VITALS — HEART RATE: 97 BPM | OXYGEN SATURATION: 91 %

## 2018-01-04 VITALS — OXYGEN SATURATION: 94 % | HEART RATE: 94 BPM

## 2018-01-04 LAB
BASOPHILS # BLD: 0.02 K/UL (ref 0–0.2)
BASOPHILS NFR BLD: 0.2 %
BUN SERPL-MCNC: 25 MG/DL (ref 7–18)
CALCIUM SERPL-MCNC: 8.5 MG/DL (ref 8.5–10.1)
CO2 SERPL-SCNC: 33 MMOL/L (ref 21–32)
CREAT SERPL-MCNC: 0.69 MG/DL (ref 0.6–1.2)
EOS ABS #: 0 K/UL (ref 0–0.5)
EOSINOPHIL NFR BLD AUTO: 309 K/UL (ref 130–400)
GLUCOSE SERPL-MCNC: 154 MG/DL (ref 70–99)
HCT VFR BLD CALC: 32.9 % (ref 37–47)
HGB BLD-MCNC: 10.3 G/DL (ref 12–16)
IG#: 0.13 K/UL (ref 0–0.02)
IMM GRANULOCYTES NFR BLD AUTO: 7.9 %
LYMPHOCYTES # BLD: 0.91 K/UL (ref 1.2–3.4)
MCH RBC QN AUTO: 29.3 PG (ref 25–34)
MCHC RBC AUTO-ENTMCNC: 31.3 G/DL (ref 32–36)
MCV RBC AUTO: 93.7 FL (ref 80–100)
MONO ABS #: 1.06 K/UL (ref 0.11–0.59)
MONOCYTES NFR BLD: 9.3 %
NEUT ABS #: 9.33 K/UL (ref 1.4–6.5)
NEUTROPHILS # BLD AUTO: 0 %
NEUTROPHILS NFR BLD AUTO: 81.5 %
PMV BLD AUTO: 9.3 FL (ref 7.4–10.4)
POTASSIUM SERPL-SCNC: 5.3 MMOL/L (ref 3.5–5.1)
RED CELL DISTRIBUTION WIDTH CV: 14.2 % (ref 11.5–14.5)
RED CELL DISTRIBUTION WIDTH SD: 48.8 FL (ref 36.4–46.3)
SODIUM SERPL-SCNC: 132 MMOL/L (ref 136–145)
WBC # BLD AUTO: 11.45 K/UL (ref 4.8–10.8)

## 2018-01-04 RX ADMIN — MESALAMINE SCH MG: 250 CAPSULE ORAL at 19:59

## 2018-01-04 RX ADMIN — HEPARIN SODIUM SCH UNIT: 10000 INJECTION, SOLUTION INTRAVENOUS; SUBCUTANEOUS at 08:04

## 2018-01-04 RX ADMIN — MIRTAZAPINE SCH MG: 15 TABLET, FILM COATED ORAL at 19:58

## 2018-01-04 RX ADMIN — SIMVASTATIN SCH MG: 20 TABLET, FILM COATED ORAL at 19:58

## 2018-01-04 RX ADMIN — METHYLPREDNISOLONE SODIUM SUCCINATE SCH MLS/MIN: 1 INJECTION, POWDER, FOR SOLUTION INTRAMUSCULAR; INTRAVENOUS at 02:15

## 2018-01-04 RX ADMIN — LEVALBUTEROL HYDROCHLORIDE SCH MG: 1.25 SOLUTION RESPIRATORY (INHALATION) at 14:27

## 2018-01-04 RX ADMIN — LATANOPROST SCH DROPS: 50 SOLUTION/ DROPS OPHTHALMIC at 19:57

## 2018-01-04 RX ADMIN — METHYLPREDNISOLONE SODIUM SUCCINATE SCH MLS/MIN: 1 INJECTION, POWDER, FOR SOLUTION INTRAMUSCULAR; INTRAVENOUS at 19:56

## 2018-01-04 RX ADMIN — LEVOFLOXACIN SCH MLS/HR: 5 INJECTION, SOLUTION INTRAVENOUS at 05:40

## 2018-01-04 RX ADMIN — PIPERACILLIN SODIUM, TAZOBACTAM SODIUM SCH MLS/HR: 4; .5 INJECTION, POWDER, LYOPHILIZED, FOR SOLUTION INTRAVENOUS at 09:54

## 2018-01-04 RX ADMIN — CITALOPRAM HYDROBROMIDE SCH MG: 40 TABLET ORAL at 08:00

## 2018-01-04 RX ADMIN — STANDARDIZED SENNA CONCENTRATE AND DOCUSATE SODIUM SCH TAB: 8.6; 5 TABLET ORAL at 08:00

## 2018-01-04 RX ADMIN — SODIUM CHLORIDE SCH MLS/HR: 900 INJECTION, SOLUTION INTRAVENOUS at 22:23

## 2018-01-04 RX ADMIN — LEVALBUTEROL HYDROCHLORIDE SCH MG: 1.25 SOLUTION RESPIRATORY (INHALATION) at 07:20

## 2018-01-04 RX ADMIN — SODIUM CHLORIDE SCH MLS/HR: 900 INJECTION, SOLUTION INTRAVENOUS at 09:54

## 2018-01-04 RX ADMIN — MESALAMINE SCH MG: 250 CAPSULE ORAL at 08:00

## 2018-01-04 RX ADMIN — PANTOPRAZOLE SCH MG: 40 TABLET, DELAYED RELEASE ORAL at 08:00

## 2018-01-04 RX ADMIN — METHYLPREDNISOLONE SODIUM SUCCINATE SCH MLS/MIN: 1 INJECTION, POWDER, FOR SOLUTION INTRAMUSCULAR; INTRAVENOUS at 08:01

## 2018-01-04 RX ADMIN — LEVALBUTEROL HYDROCHLORIDE SCH MG: 1.25 SOLUTION RESPIRATORY (INHALATION) at 19:29

## 2018-01-04 RX ADMIN — PIPERACILLIN SODIUM, TAZOBACTAM SODIUM SCH MLS/HR: 4; .5 INJECTION, POWDER, LYOPHILIZED, FOR SOLUTION INTRAVENOUS at 18:09

## 2018-01-04 RX ADMIN — HEPARIN SODIUM SCH UNIT: 10000 INJECTION, SOLUTION INTRAVENOUS; SUBCUTANEOUS at 19:58

## 2018-01-04 RX ADMIN — PIPERACILLIN SODIUM, TAZOBACTAM SODIUM SCH MLS/HR: 4; .5 INJECTION, POWDER, LYOPHILIZED, FOR SOLUTION INTRAVENOUS at 02:15

## 2018-01-04 RX ADMIN — LEVALBUTEROL HYDROCHLORIDE SCH MG: 1.25 SOLUTION RESPIRATORY (INHALATION) at 02:04

## 2018-01-04 RX ADMIN — LISINOPRIL SCH MG: 20 TABLET ORAL at 08:01

## 2018-01-04 RX ADMIN — METHYLPREDNISOLONE SODIUM SUCCINATE SCH MLS/MIN: 1 INJECTION, POWDER, FOR SOLUTION INTRAMUSCULAR; INTRAVENOUS at 13:49

## 2018-01-04 NOTE — PULMONOLOGY PROGRESS NOTE
Pulmonary Progress Note


Date of Service


Jan 4, 2018.





Attending


Dr. Fuentes





Subjective


Patient seen and examined this morning at bedside.


She did not tolerate BIPAP overnight. She is tired and lethargic this morning.


Currently on BIPAP. She is obviously tachypneic.





Objective


VS reviewed. , //76, , RR 18-25, SaO2 92-96% on HFNC 

50 L/m, 50%. 1.5 L + since admission


Gen: AAOx3, appears to be respiratory distress.


CVS: S1, S2, RRR


Lungs: decreased breath sound bilaterally, crackles at bases, tachypneic


Abd: soft/NT/NT/BS+


Ext: left lower extremity in splint, right lower extremity trace edema, chronic 

venous changes; no cyanosis, no clubbing





Labs reviewed. 


   CO2 33 today


Imaging reviewed. 





CTA chest 1/3/2017


   IMPRESSION:  


1. Study compromised by respiratory motion artifact


2. No evidence of acute pulmonary embolism


3. No evidence of pathologic adenopathy


4. 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


5. Dense bilateral lower lobe atelectasis/consolidation. Patchy airspace


opacities are also present within the right upper lobe lingula and right middle


lobe.


6. Small bilateral pleural effusions


7. Severe L1 compression fracture with mild retropulsion.








Medications reviewed.





Assessment & Plan


Acute on chronic hypercapnic hypoxic respiratory failure


COPD (unknown FEV1) in acute exacerbation


Hypertensive urgency


Possible diastolic dysfunction


Electrolyte imbalance


Tobacco use disorder


Morbid obesity





Mrs. Pittman is a 82-year-old female who presents with acute on chronic 

hypercapnic respiratory failure with status post fall and sustaining left 

bimalleolar fracture.  She is on chronic long-term oxygen therapy at 2 L/m 

during the day and 4 L/m at night, for COPD.


She is not doing well from a respiratory standpoint. She is still quite 

tachypneic and not tolerating BIPAP. I have her high flow nasal canula 50%, 50 L

/m and she is saturating satisfactorily in the low 90's. 





PE rule on CTA, however she does have bibasilar opacities which could represent 

atelectasis and possible pneumonia. TTE technically difficult, but no know 

obvious heart failure.





Recommendations


Continue with high flow nasal canula to maintain SaO2 between 88-92%.


Continue with intermittent BiPAP as tolerated.


Continue to treat for COPD exacerbation with broad-spectrum antibiotics, 

nebulizers and IV corticosteroids.





Taper steroids as tolerated.  Encourage flutter valve and incentive spirometry.


May benefit from diuresis.


Continue with DVT prophylaxis.





I will continue to follow. Discussed with Dr. Hendrix.





Data


Medications:





Current Inpatient Medications








 Medications


  (Trade)  Dose


 Ordered  Sig/Donna


 Route  Start Time


 Stop Time Status Last Admin


Dose Admin


 


 Levalbuterol


  (Xopenex 1.25MG/


 3ML Neb)  1.25 mg  Q6R


 INH  1/1/18 22:30


 1/31/18 22:29  1/4/18 07:20


1.25 MG


 


 Methylprednisolone


 Sodium Succinate


 40 mg/Syringe  0.64 ml @ 


 1.5 mls/min  Q6H


 IV  1/2/18 02:00


 2/1/18 01:59  1/4/18 08:01


1.5 MLS/MIN


 


 Levofloxacin


  (Consult)  1 ea  UD  PRN


 N/A  1/2/18 01:09


 2/1/18 01:08   


 


 


 Tramadol HCl


  (Ultram Tab)  50 mg  Q6H  PRN


 PO  1/1/18 21:30


 1/31/18 21:29  1/2/18 07:55


50 MG


 


 Clonidine HCl


  (Catapres Tab)  0.1 mg  Q6H  PRN


 PO  1/1/18 21:30


 1/31/18 21:29  1/3/18 16:21


0.1 MG


 


 Citalopram


 Hydrobromide


  (celeXA TAB)  40 mg  DAILY


 PO  1/2/18 09:00


 2/1/18 08:59  1/4/18 08:00


40 MG


 


 Latanoprost


  (Xalatan Oph


 Soln)  1 drops  HS


 OP  1/2/18 21:00


 2/1/18 20:59  1/3/18 20:42


1 DROPS


 


 Lisinopril


  (Zestril Tab)  20 mg  DAILY


 PO  1/2/18 09:00


 2/1/18 08:59  1/4/18 08:01


20 MG


 


 Mesalamine


  (Pentasa


 Controlled Rel


 Cap)  1,000 mg  BID


 PO  1/2/18 09:00


 2/1/18 08:59  1/4/18 08:00


1,000 MG


 


 Mirtazapine


  (Remeron Tab)  15 mg  HS


 PO  1/2/18 21:00


 2/1/18 20:59  1/3/18 20:43


15 MG


 


 Pantoprazole


 Sodium


  (Protonix Tab)  40 mg  DAILY


 PO  1/2/18 09:00


 2/1/18 08:59  1/4/18 08:00


40 MG


 


 Senna/Docusate


 Sodium


  (Senokot S Tab)  1 tab  DAILY


 PO  1/2/18 09:00


 2/1/18 08:59  1/4/18 08:00


1 TAB


 


 Simvastatin


  (Zocor Tab)  20 mg  HS


 PO  1/2/18 21:00


 2/1/18 20:59  1/3/18 20:43


20 MG


 


 Heparin Sodium


  (Porcine)


  (Heparin Sq 5000


 Unit/0.5ml)  5,000 unit  Q12H


 SQ  1/2/18 09:00


 2/1/18 08:59  1/4/18 08:04


5,000 UNIT


 


 Sodium Chloride  1,000 ml @ 


 80 mls/hr  [id number]


 IV  1/1/18 21:39


 1/31/18 21:38  1/4/18 09:54


80 MLS/HR


 


 Acetaminophen


  (Tylenol Tab)  650 mg  Q4H  PRN


 PO  1/1/18 21:45


 1/31/18 21:44  1/3/18 16:22


650 MG


 


 Ondansetron HCl


  (Zofran Inj)  4 mg  Q6H  PRN


 IV  1/1/18 21:45


 1/31/18 21:44   


 


 


 Piperacillin Sod/


 Tazobactam Sod


 4.5 gm/Dextrose  120 ml @ 


 30 mls/hr  Q8H


 IV  1/2/18 02:00


 1/9/18 01:59  1/4/18 09:54


30 MLS/HR


 


 Levofloxacin 750


 mg/Prmx  150 ml @ 


 100 mls/hr  Q24H


 IV  1/2/18 06:00


 1/9/18 05:59  1/4/18 05:40


100 MLS/HR


 


 Piperacillin Sod/


 Tazobactam Sod


  (Consult)  1 ea  UD  PRN


 N/A  1/2/18 01:15


 2/1/18 01:14   


 


 


 Ioversol


  (Optiray 320)  100 ml  UD  PRN


 IV  1/3/18 11:00


 1/7/18 10:59   


 








Vital Signs:











  Date Time  Temp Pulse Resp B/P (MAP) Pulse Ox O2 Delivery O2 Flow Rate FiO2


 


1/4/18 11:37 36.8 74 18 149/74 (99) 92   


 


1/4/18 08:20      High Flow Oxygen 50.0 70


 


1/4/18 07:26 37.1 102 18 168/76 (106) 90   


 


1/4/18 07:20  100 22  91 Nasal Cannula 50.0 95


 


1/4/18 04:00      High Flow Oxygen 50.0 70


 


1/4/18 03:35 36.6 94 25 160/79 (106) 96 High Flow Oxygen  50


 


1/4/18 02:06  94 22  96 Nasal Cannula 50.0 90


 


1/4/18 00:00      BiPAP  


 


1/3/18 23:50  68   92   60


 


1/3/18 23:33 36.6 72 20 157/84 (108) 94 CPAP  60


 


1/3/18 22:36  92   91   60


 


1/3/18 20:00     93 High Flow Oxygen 45.0 60


 


1/3/18 19:54 36.9 95 22 159/108 (125) 92 High Flow Oxygen  


 


1/3/18 19:22  94 18  92 Nasal Cannula 40.0 70


 


1/3/18 16:00     92 High Flow Oxygen 45.0 60


 


1/3/18 15:38 37.2 102 24 177/101 (126) 90 High Flow Oxygen  


 


1/3/18 14:39  88 18  93 Mask 9.0 


 


1/3/18 12:00 36.5 94 18 151/73 (99) 93 BiPAP  


 


1/3/18 12:00      Oxymask 10.0 








Laboratory Results:





Last 24 Hours








Test


  1/3/18


11:58 1/4/18


05:29


 


Arterial Blood pH 7.20  


 


Arterial Blood Partial


Pressure CO2 76 mmHg 


  


 


 


Arterial Blood Partial


Pressure O2 67 mm/Hg 


  


 


 


Arterial Blood HCO3 29 mmol/L  


 


Arterial Blood Oxygen


Saturation 88.7 % 


  


 


 


Arterial Blood Base Excess 0.1 mEq/L  


 


Arterial Blood Gas Delivery 10L  


 


Alberto Test POS  


 


White Blood Count  11.45 K/uL 


 


Red Blood Count  3.51 M/uL 


 


Hemoglobin  10.3 g/dL 


 


Hematocrit  32.9 % 


 


Mean Corpuscular Volume  93.7 fL 


 


Mean Corpuscular Hemoglobin  29.3 pg 


 


Mean Corpuscular Hemoglobin


Concent 


  31.3 g/dl 


 


 


Platelet Count  309 K/uL 


 


Mean Platelet Volume  9.3 fL 


 


Neutrophils (%) (Auto)  81.5 % 


 


Lymphocytes (%) (Auto)  7.9 % 


 


Monocytes (%) (Auto)  9.3 % 


 


Eosinophils (%) (Auto)  0.0 % 


 


Basophils (%) (Auto)  0.2 % 


 


Neutrophils # (Auto)  9.33 K/uL 


 


Lymphocytes # (Auto)  0.91 K/uL 


 


Monocytes # (Auto)  1.06 K/uL 


 


Eosinophils # (Auto)  0.00 K/uL 


 


Basophils # (Auto)  0.02 K/uL 


 


RDW Standard Deviation  48.8 fL 


 


RDW Coefficient of Variation  14.2 % 


 


Immature Granulocyte % (Auto)  1.1 % 


 


Immature Granulocyte # (Auto)  0.13 K/uL 


 


Sodium Level  132 mmol/L 


 


Potassium Level  5.3 mmol/L 


 


Chloride Level  96 mmol/L 


 


Carbon Dioxide Level  33 mmol/L 


 


Anion Gap  3.0 mmol/L 


 


Blood Urea Nitrogen  25 mg/dl 


 


Creatinine  0.69 mg/dl 


 


Est Creatinine Clear Calc


Drug Dose 


  75.8 ml/min 


 


 


Estimated GFR (


American) 


  94.0 


 


 


Estimated GFR (Non-


American 


  81.1 


 


 


BUN/Creatinine Ratio  36.1 


 


Random Glucose  154 mg/dl 


 


Calcium Level  8.5 mg/dl 


 


Chemistry Specimen Hemolysis

## 2018-01-04 NOTE — PROGRESS NOTE
Medicine Progress Note


Date & Time of Visit:


Jan 4, 2018 at 20:54.


Subjective


Pt was seen and examined


Lying in bed with mild respiratory distress


Continue to cough


Denies any chest pain and fever





Objective





Last 8 Hrs








  Date Time  Temp Pulse Resp B/P (MAP) Pulse Ox O2 Delivery O2 Flow Rate FiO2


 


1/4/18 20:43  93   91   100


 


1/4/18 19:49 36.9 93 20 150/72 (98) 93 High Flow Oxygen  


 


1/4/18 19:29  86 24  95 Nasal Cannula 50.0 96


 


1/4/18 15:19 36.9 99 30 107/73 (84) 93 High Flow Oxygen  


 


1/4/18 14:28  94 20  94 Nasal Cannula 50.0 85








Physical Exam:


General- No acute distress


Head-  atraumatic


Eyes- PERRL, EOMI


ENT- oropharynx clear


Neck- supple, no JVD


Lungs- coarse BS 


Heart- regular rhythm; no murmur


Abdomen- normal bowel sounds, soft


Extremities- no calf tenderness, Left ankle pain, splint with ace bandage in LE


Neuro- alert, oriented x 3; PERRL, EOMI


Skin- warm & dry


Laboratory Results:





Last 24 Hours








Test


  1/4/18


05:29


 


White Blood Count 11.45 K/uL 


 


Red Blood Count 3.51 M/uL 


 


Hemoglobin 10.3 g/dL 


 


Hematocrit 32.9 % 


 


Mean Corpuscular Volume 93.7 fL 


 


Mean Corpuscular Hemoglobin 29.3 pg 


 


Mean Corpuscular Hemoglobin


Concent 31.3 g/dl 


 


 


Platelet Count 309 K/uL 


 


Mean Platelet Volume 9.3 fL 


 


Neutrophils (%) (Auto) 81.5 % 


 


Lymphocytes (%) (Auto) 7.9 % 


 


Monocytes (%) (Auto) 9.3 % 


 


Eosinophils (%) (Auto) 0.0 % 


 


Basophils (%) (Auto) 0.2 % 


 


Neutrophils # (Auto) 9.33 K/uL 


 


Lymphocytes # (Auto) 0.91 K/uL 


 


Monocytes # (Auto) 1.06 K/uL 


 


Eosinophils # (Auto) 0.00 K/uL 


 


Basophils # (Auto) 0.02 K/uL 


 


RDW Standard Deviation 48.8 fL 


 


RDW Coefficient of Variation 14.2 % 


 


Immature Granulocyte % (Auto) 1.1 % 


 


Immature Granulocyte # (Auto) 0.13 K/uL 


 


Sodium Level 132 mmol/L 


 


Potassium Level 5.3 mmol/L 


 


Chloride Level 96 mmol/L 


 


Carbon Dioxide Level 33 mmol/L 


 


Anion Gap 3.0 mmol/L 


 


Blood Urea Nitrogen 25 mg/dl 


 


Creatinine 0.69 mg/dl 


 


Est Creatinine Clear Calc


Drug Dose 75.8 ml/min 


 


 


Estimated GFR (


American) 94.0 


 


 


Estimated GFR (Non-


American 81.1 


 


 


BUN/Creatinine Ratio 36.1 


 


Random Glucose 154 mg/dl 


 


Calcium Level 8.5 mg/dl 


 


Chemistry Specimen Hemolysis  











Assessment & Plan


Acute hypercapnic respiratory failure 


Mostly related to COPD exacerbation vs Pneumonia


CXR showed dense bibasilar airspace consolidation, right greater than left with 

associated pleural effusions.


ABG on admission showed respiratory acidosis with comp


On abx with Levaquin and Zosyn


Negative influenza test 


Continue Solumedrol


Continue supplement oxygen and intermittent BIPAP as tolerated


Continue respiratory treatment with Duoneb


Blood cx no growth


monitor closely


CT with PE protocol showed no evidence for PE. Dense bilateral lower lobe 

atelectasis/consolidation. Patchy airspace


opacities are also present within the right upper lobe lingula and right middle 

lobe.


ECHO


* The echocardiogram is extremely technically limited.


* Patient was sitting upright for the test.


* There is no significant pericardial effusion noted.


* The left ventricular systolic function is grossly normal on limited 

visualization.


* The right ventricular systolic function is grossly normal.


* The valves are poorly visualized.


* There is no significant valvular stenosis or regurgitation on technically 

limited Doppler evaluation.





Hyponatremia


Possible related to hypovolemia  


Na on admission 128


Na today 132


On IVF with NS, will monitor closely





Hyperkalemia


K 5.3


If stay high, will give Kayexalate


Continue monitor BMP





Left Ankle fracture s/p fall 


Left ankle xray showed distal tibial and fibular fractures as above with mild 

lateral subluxation of the talus.


Ortho on board


pain control


On 10L oxygen now, high risk for surgery for now


Will need to postpone surgery until respiratory status improves





Right pelvic ring fracture, 


Pelvis xray showed an age indeterminant right pubic ring fracture, possibly 

chronic.


Stable








Hypertension


BP elevated 


Continue lisinopril.  


PRN clonidine


Continue monitor BP








Ulcerative colitis


On Pentasa


Stable





Depression


On Celexa


Stable





DVT px 


On heparin.  





Code DNR as per my discussion with Pt





CODE STATUS


Continue monitor in tele


Current Inpatient Medications:





Current Inpatient Medications








 Medications


  (Trade)  Dose


 Ordered  Sig/Donna


 Route  Start Time


 Stop Time Status Last Admin


Dose Admin


 


 Levalbuterol


  (Xopenex 1.25MG/


 3ML Neb)  1.25 mg  Q6R


 INH  1/1/18 22:30


 1/31/18 22:29  1/4/18 19:29


1.25 MG


 


 Methylprednisolone


 Sodium Succinate


 40 mg/Syringe  0.64 ml @ 


 1.5 mls/min  Q6H


 IV  1/2/18 02:00


 2/1/18 01:59  1/4/18 19:56


1.5 MLS/MIN


 


 Levofloxacin


  (Consult)  1 ea  UD  PRN


 N/A  1/2/18 01:09


 2/1/18 01:08   


 


 


 Tramadol HCl


  (Ultram Tab)  50 mg  Q6H  PRN


 PO  1/1/18 21:30


 1/31/18 21:29  1/2/18 07:55


50 MG


 


 Clonidine HCl


  (Catapres Tab)  0.1 mg  Q6H  PRN


 PO  1/1/18 21:30


 1/31/18 21:29  1/3/18 16:21


0.1 MG


 


 Citalopram


 Hydrobromide


  (celeXA TAB)  40 mg  DAILY


 PO  1/2/18 09:00


 2/1/18 08:59  1/4/18 08:00


40 MG


 


 Latanoprost


  (Xalatan Oph


 Soln)  1 drops  HS


 OP  1/2/18 21:00


 2/1/18 20:59  1/4/18 19:57


1 DROPS


 


 Lisinopril


  (Zestril Tab)  20 mg  DAILY


 PO  1/2/18 09:00


 2/1/18 08:59  1/4/18 08:01


20 MG


 


 Mesalamine


  (Pentasa


 Controlled Rel


 Cap)  1,000 mg  BID


 PO  1/2/18 09:00


 2/1/18 08:59  1/4/18 19:59


1,000 MG


 


 Mirtazapine


  (Remeron Tab)  15 mg  HS


 PO  1/2/18 21:00


 2/1/18 20:59  1/4/18 19:58


15 MG


 


 Pantoprazole


 Sodium


  (Protonix Tab)  40 mg  DAILY


 PO  1/2/18 09:00


 2/1/18 08:59  1/4/18 08:00


40 MG


 


 Senna/Docusate


 Sodium


  (Senokot S Tab)  1 tab  DAILY


 PO  1/2/18 09:00


 2/1/18 08:59  1/4/18 08:00


1 TAB


 


 Simvastatin


  (Zocor Tab)  20 mg  HS


 PO  1/2/18 21:00


 2/1/18 20:59  1/4/18 19:58


20 MG


 


 Heparin Sodium


  (Porcine)


  (Heparin Sq 5000


 Unit/0.5ml)  5,000 unit  Q12H


 SQ  1/2/18 09:00


 2/1/18 08:59  1/4/18 19:58


5,000 UNIT


 


 Sodium Chloride  1,000 ml @ 


 80 mls/hr  [id number]


 IV  1/1/18 21:39


 1/31/18 21:38  1/4/18 09:54


80 MLS/HR


 


 Acetaminophen


  (Tylenol Tab)  650 mg  Q4H  PRN


 PO  1/1/18 21:45


 1/31/18 21:44  1/3/18 16:22


650 MG


 


 Ondansetron HCl


  (Zofran Inj)  4 mg  Q6H  PRN


 IV  1/1/18 21:45


 1/31/18 21:44   


 


 


 Piperacillin Sod/


 Tazobactam Sod


 4.5 gm/Dextrose  120 ml @ 


 30 mls/hr  Q8H


 IV  1/2/18 02:00


 1/9/18 01:59  1/4/18 18:09


30 MLS/HR


 


 Levofloxacin 750


 mg/Prmx  150 ml @ 


 100 mls/hr  Q24H


 IV  1/2/18 06:00


 1/9/18 05:59  1/4/18 05:40


100 MLS/HR


 


 Piperacillin Sod/


 Tazobactam Sod


  (Consult)  1 ea  UD  PRN


 N/A  1/2/18 01:15


 2/1/18 01:14   


 


 


 Ioversol


  (Optiray 320)  100 ml  UD  PRN


 IV  1/3/18 11:00


 1/7/18 10:59

## 2018-01-05 VITALS
DIASTOLIC BLOOD PRESSURE: 85 MMHG | OXYGEN SATURATION: 92 % | TEMPERATURE: 98.78 F | SYSTOLIC BLOOD PRESSURE: 166 MMHG | HEART RATE: 92 BPM

## 2018-01-05 VITALS
DIASTOLIC BLOOD PRESSURE: 79 MMHG | OXYGEN SATURATION: 91 % | TEMPERATURE: 97.88 F | SYSTOLIC BLOOD PRESSURE: 172 MMHG | HEART RATE: 78 BPM

## 2018-01-05 VITALS — HEART RATE: 95 BPM | OXYGEN SATURATION: 96 %

## 2018-01-05 VITALS
DIASTOLIC BLOOD PRESSURE: 80 MMHG | OXYGEN SATURATION: 92 % | HEART RATE: 97 BPM | TEMPERATURE: 98.78 F | SYSTOLIC BLOOD PRESSURE: 112 MMHG

## 2018-01-05 VITALS
DIASTOLIC BLOOD PRESSURE: 82 MMHG | OXYGEN SATURATION: 95 % | SYSTOLIC BLOOD PRESSURE: 173 MMHG | HEART RATE: 80 BPM | TEMPERATURE: 98.6 F

## 2018-01-05 VITALS
TEMPERATURE: 98.6 F | DIASTOLIC BLOOD PRESSURE: 98 MMHG | OXYGEN SATURATION: 95 % | HEART RATE: 92 BPM | SYSTOLIC BLOOD PRESSURE: 174 MMHG

## 2018-01-05 VITALS — SYSTOLIC BLOOD PRESSURE: 112 MMHG | DIASTOLIC BLOOD PRESSURE: 60 MMHG

## 2018-01-05 VITALS — HEART RATE: 90 BPM | OXYGEN SATURATION: 100 %

## 2018-01-05 VITALS
OXYGEN SATURATION: 88 % | HEART RATE: 146 BPM | DIASTOLIC BLOOD PRESSURE: 76 MMHG | SYSTOLIC BLOOD PRESSURE: 130 MMHG | TEMPERATURE: 98.78 F

## 2018-01-05 VITALS — OXYGEN SATURATION: 87 % | HEART RATE: 96 BPM

## 2018-01-05 VITALS — DIASTOLIC BLOOD PRESSURE: 98 MMHG | SYSTOLIC BLOOD PRESSURE: 164 MMHG

## 2018-01-05 VITALS — HEART RATE: 83 BPM | OXYGEN SATURATION: 93 %

## 2018-01-05 LAB
BASOPHILS # BLD: 0.03 K/UL (ref 0–0.2)
BASOPHILS NFR BLD: 0.3 %
BUN SERPL-MCNC: 24 MG/DL (ref 7–18)
BUN SERPL-MCNC: 25 MG/DL (ref 7–18)
CALCIUM SERPL-MCNC: 8.5 MG/DL (ref 8.5–10.1)
CALCIUM SERPL-MCNC: 8.5 MG/DL (ref 8.5–10.1)
CO2 SERPL-SCNC: 37 MMOL/L (ref 21–32)
CO2 SERPL-SCNC: 39 MMOL/L (ref 21–32)
CREAT SERPL-MCNC: 0.66 MG/DL (ref 0.6–1.2)
CREAT SERPL-MCNC: 0.81 MG/DL (ref 0.6–1.2)
EOS ABS #: 0 K/UL (ref 0–0.5)
EOSINOPHIL NFR BLD AUTO: 289 K/UL (ref 130–400)
GLUCOSE SERPL-MCNC: 148 MG/DL (ref 70–99)
GLUCOSE SERPL-MCNC: 185 MG/DL (ref 70–99)
HCT VFR BLD CALC: 33.7 % (ref 37–47)
HGB BLD-MCNC: 10.5 G/DL (ref 12–16)
IG#: 0.21 K/UL (ref 0–0.02)
IMM GRANULOCYTES NFR BLD AUTO: 6.5 %
LYMPHOCYTES # BLD: 0.71 K/UL (ref 1.2–3.4)
MCH RBC QN AUTO: 29.5 PG (ref 25–34)
MCHC RBC AUTO-ENTMCNC: 31.2 G/DL (ref 32–36)
MCV RBC AUTO: 94.7 FL (ref 80–100)
MONO ABS #: 1.06 K/UL (ref 0.11–0.59)
MONOCYTES NFR BLD: 9.7 %
NEUT ABS #: 8.93 K/UL (ref 1.4–6.5)
NEUTROPHILS # BLD AUTO: 0 %
NEUTROPHILS NFR BLD AUTO: 81.6 %
PMV BLD AUTO: 9.2 FL (ref 7.4–10.4)
POTASSIUM SERPL-SCNC: 4.5 MMOL/L (ref 3.5–5.1)
POTASSIUM SERPL-SCNC: 5.3 MMOL/L (ref 3.5–5.1)
PTT PATIENT: 25.4 SECONDS (ref 21–31)
RED CELL DISTRIBUTION WIDTH CV: 13.9 % (ref 11.5–14.5)
RED CELL DISTRIBUTION WIDTH SD: 48.6 FL (ref 36.4–46.3)
SODIUM SERPL-SCNC: 132 MMOL/L (ref 136–145)
SODIUM SERPL-SCNC: 133 MMOL/L (ref 136–145)
WBC # BLD AUTO: 10.94 K/UL (ref 4.8–10.8)

## 2018-01-05 RX ADMIN — PANTOPRAZOLE SCH MG: 40 TABLET, DELAYED RELEASE ORAL at 09:27

## 2018-01-05 RX ADMIN — PIPERACILLIN SODIUM, TAZOBACTAM SODIUM SCH MLS/HR: 4; .5 INJECTION, POWDER, LYOPHILIZED, FOR SOLUTION INTRAVENOUS at 09:21

## 2018-01-05 RX ADMIN — METHYLPREDNISOLONE SODIUM SUCCINATE SCH MLS/MIN: 1 INJECTION, POWDER, FOR SOLUTION INTRAMUSCULAR; INTRAVENOUS at 21:40

## 2018-01-05 RX ADMIN — PIPERACILLIN SODIUM, TAZOBACTAM SODIUM SCH MLS/HR: 4; .5 INJECTION, POWDER, LYOPHILIZED, FOR SOLUTION INTRAVENOUS at 02:15

## 2018-01-05 RX ADMIN — STANDARDIZED SENNA CONCENTRATE AND DOCUSATE SODIUM SCH TAB: 8.6; 5 TABLET ORAL at 09:27

## 2018-01-05 RX ADMIN — MESALAMINE SCH MG: 250 CAPSULE ORAL at 09:28

## 2018-01-05 RX ADMIN — SIMVASTATIN SCH MG: 20 TABLET, FILM COATED ORAL at 21:40

## 2018-01-05 RX ADMIN — LISINOPRIL SCH MG: 20 TABLET ORAL at 09:27

## 2018-01-05 RX ADMIN — LEVALBUTEROL HYDROCHLORIDE SCH MG: 1.25 SOLUTION RESPIRATORY (INHALATION) at 18:58

## 2018-01-05 RX ADMIN — MESALAMINE SCH MG: 250 CAPSULE ORAL at 21:40

## 2018-01-05 RX ADMIN — METHYLPREDNISOLONE SODIUM SUCCINATE SCH MLS/MIN: 1 INJECTION, POWDER, FOR SOLUTION INTRAMUSCULAR; INTRAVENOUS at 13:48

## 2018-01-05 RX ADMIN — LEVALBUTEROL HYDROCHLORIDE SCH MG: 1.25 SOLUTION RESPIRATORY (INHALATION) at 07:17

## 2018-01-05 RX ADMIN — HEPARIN SODIUM SCH UNIT: 10000 INJECTION, SOLUTION INTRAVENOUS; SUBCUTANEOUS at 09:21

## 2018-01-05 RX ADMIN — LEVOFLOXACIN SCH MLS/HR: 5 INJECTION, SOLUTION INTRAVENOUS at 05:45

## 2018-01-05 RX ADMIN — LEVALBUTEROL HYDROCHLORIDE SCH MG: 1.25 SOLUTION RESPIRATORY (INHALATION) at 14:05

## 2018-01-05 RX ADMIN — MIRTAZAPINE SCH MG: 15 TABLET, FILM COATED ORAL at 21:40

## 2018-01-05 RX ADMIN — METHYLPREDNISOLONE SODIUM SUCCINATE SCH MLS/MIN: 1 INJECTION, POWDER, FOR SOLUTION INTRAMUSCULAR; INTRAVENOUS at 09:17

## 2018-01-05 RX ADMIN — METHYLPREDNISOLONE SODIUM SUCCINATE SCH MLS/MIN: 1 INJECTION, POWDER, FOR SOLUTION INTRAMUSCULAR; INTRAVENOUS at 02:15

## 2018-01-05 RX ADMIN — LEVALBUTEROL HYDROCHLORIDE SCH MG: 1.25 SOLUTION RESPIRATORY (INHALATION) at 02:03

## 2018-01-05 RX ADMIN — CITALOPRAM HYDROBROMIDE SCH MG: 40 TABLET ORAL at 09:27

## 2018-01-05 RX ADMIN — PIPERACILLIN SODIUM, TAZOBACTAM SODIUM SCH MLS/HR: 4; .5 INJECTION, POWDER, LYOPHILIZED, FOR SOLUTION INTRAVENOUS at 16:56

## 2018-01-05 RX ADMIN — LATANOPROST SCH DROPS: 50 SOLUTION/ DROPS OPHTHALMIC at 21:41

## 2018-01-05 NOTE — PROGRESS NOTE
Internal Med Progress Note


Date of Service:


Jan 5, 2018.


Provider Documentation:





Made aware by RN of tachycardia around 7:30 PM.








CR 140s on the monitor.





Initial SVT later atrial flutter





No response to vagal maneuvers as per RN.





Unable to obtain BP initially.





Patient mentating at baseline, denies chest pain or unusual shortness of breath 

as per RN.





Some dizziness as per patient








stat IVF bolus, digoxin given.  








SBP 140s later on.





AP





New onset atrial flutter





IV Cardizem bolus now


Initiate maintenance oral Cardizem 


Hold other antihypertensive meds for now 


Check lytes


Cardiology consult in AM re: new onset AFL 





We relay to AM provider.


Vital Signs:











  Date Time  Temp Pulse Resp B/P (MAP) Pulse Ox O2 Delivery O2 Flow Rate FiO2


 


1/6/18 04:00      BiPAP  


 


1/6/18 03:33 36.5 108 19 164/83 (110) 94 CPAP  


 


1/6/18 01:36  108 28  95 BiPAP/CPAP  70


 


1/6/18 00:02  96   91   70


 


1/6/18 00:00      BiPAP  


 


1/5/18 23:33 37.1 97 25 112/80 (91) 92 High Flow Oxygen  


 


1/5/18 23:16    164/98 (120)    


 


1/5/18 20:35    112/60 (77)    


 


1/5/18 20:00      BiPAP  


 


1/5/18 19:43  144      


 


1/5/18 19:26 37.1 146 19 130/76 (94) 88 High Flow Oxygen  


 


1/5/18 19:01  96 20  87 Nasal Cannula 50.0 95


 


1/5/18 16:00      BiPAP  


 


1/5/18 15:12 36.6 78 21 172/79 (110) 91 BiPAP  


 


1/5/18 14:07  90   100   100


 


1/5/18 14:05  90 19  100 BiPAP/CPAP  100


 


1/5/18 12:00      BiPAP  


 


1/5/18 11:40 37.0 92 20 174/98 (123) 95   


 


1/5/18 08:00      High Flow Oxygen  95





      Nasal Cannula  


 


1/5/18 07:55  83 23  93 Nasal Cannula 50.0 95


 


1/5/18 07:45 37.0 80 24 173/82 (112) 95 High Flow Oxygen  








Lab Results:





Results Past 24 Hours








Test


  1/5/18


08:44 1/5/18


19:52 1/6/18


06:02 Range/Units


 


 


Arterial Blood pH 7.27 7.42 7.41 7.35-7.45  


 


Arterial Blood Partial


Pressure CO2 80


  59


  63


  35-46  mmHg


 


 


Arterial Blood Partial


Pressure O2 116


  62


  71


  80-95  mm/Hg


 


 


Arterial Blood HCO3 36 37 39 19-24  mmol/L


 


Arterial Blood Oxygen


Saturation 96.7


  90.9


  92.1


  90-95  %


 


 


Arterial Blood Base Excess 6.7 11.0 11.8 -9-1.8  mEq/L


 


Arterial Blood Gas Delivery 95% 97% 70% BIPAP  


 


Alberto Test POS POS POS POS  


 


Activated Partial


Thromboplast Time 


  25.4


  


  21.0-31.0


SECONDS


 


Partial Thromboplastin Ratio  1.0   


 


Sodium Level  132  136-145  mmol/L


 


Potassium Level  4.5  3.5-5.1  mmol/L


 


Chloride Level  92    mmol/L


 


Carbon Dioxide Level  39  21-32  mmol/L


 


Anion Gap  1.0  3-11  mmol/L


 


Blood Urea Nitrogen  25  7-18  mg/dl


 


Creatinine


  


  0.81


  


  0.60-1.20


mg/dl


 


Est Creatinine Clear Calc


Drug Dose 


  64.6


  


   ml/min


 


 


Estimated GFR (


American) 


  78.4


  


   


 


 


Estimated GFR (Non-


American 


  67.6


  


   


 


 


BUN/Creatinine Ratio  30.7  10-20  


 


Random Glucose  185  70-99  mg/dl


 


Lactic Acid Level  1.2  0.4-2.0  mmol/L


 


Calcium Level  8.5  8.5-10.1  mg/dl


 


Magnesium Level  2.1  1.8-2.4  mg/dl


 


Troponin I  0.032  0-0.045  ng/ml


 


Thyroid Stimulating Hormone


(TSH) 


  0.206


  


  0.300-4.500


uIu/ml


 


Free Thyroxine


  


  1.31


  


  0.80-1.60


ng/dl


 


White Blood Count   13.33 4.8-10.8  K/uL


 


Red Blood Count   3.58 4.2-5.4  M/uL


 


Hemoglobin   10.6 12.0-16.0  g/dL


 


Hematocrit   33.2 37-47  %


 


Mean Corpuscular Volume   92.7   fL


 


Mean Corpuscular Hemoglobin   29.6 25-34  pg


 


Mean Corpuscular Hemoglobin


Concent 


  


  31.9


  32-36  g/dl


 


 


Platelet Count   281 130-400  K/uL


 


Mean Platelet Volume   9.1 7.4-10.4  fL


 


Neutrophils (%) (Auto)   87.5  %


 


Lymphocytes (%) (Auto)   5.5  %


 


Monocytes (%) (Auto)   5.3  %


 


Eosinophils (%) (Auto)   0.0  %


 


Basophils (%) (Auto)   0.2  %


 


Neutrophils # (Auto)   11.67 1.4-6.5  K/uL


 


Lymphocytes # (Auto)   0.73 1.2-3.4  K/uL


 


Monocytes # (Auto)   0.71 0.11-0.59  K/uL


 


Eosinophils # (Auto)   0.00 0-0.5  K/uL


 


Basophils # (Auto)   0.02 0-0.2  K/uL


 


RDW Standard Deviation   47.0 36.4-46.3  fL


 


RDW Coefficient of Variation   13.8 11.5-14.5  %


 


Immature Granulocyte % (Auto)   1.5  %


 


Immature Granulocyte # (Auto)   0.20 0.00-0.02  K/uL

## 2018-01-05 NOTE — DIAGNOSTIC IMAGING REPORT
CHEST ONE VIEW PORTABLE



CLINICAL HISTORY: Hypoxia.    



COMPARISON STUDY:  Chest radiograph January 1, 2018 and chest CT January 3,

2018.



FINDINGS: There is no pneumothorax. Cardiomegaly is unchanged. Multiple

bilateral pleural effusion is slightly increased in size. There are persistent

interstitial thickening and persistent bibasilar opacities.



IMPRESSION:  



1. Persistent interstitial thickening which favors pulmonary edema however an

infectious process could appear similar.



2. Slight increase in size of small bilateral pleural effusions.



3. Persistent bibasilar opacities which could reflect pneumonia or atelectasis. 









Electronically signed by:  Selvin Simpson M.D.

1/5/2018 7:53 PM



Dictated Date/Time:  1/5/2018 7:51 PM

## 2018-01-05 NOTE — PULMONOLOGY PROGRESS NOTE
Pulmonary Progress Note


Date of Service


Jan 5, 2018.





Attending


Dr. Fuentes





Subjective


Patient seen and examined this morning.


Per nursing staff she is refusing of BiPAP.  Using high flow nasal cannula 

intermittently.


She still lethargic and mildly tachypneic.





Objective


VS reviewed. BP 37.1, //90, P 78-95, RR 19-24, SaO2  % on 

HFNC 50 L/m, 50%. 1.4 L + since admission


Gen: Lethargic, appears a bit more comfortable today


CVS: S1, S2, RRR


Lungs: decreased breath sound bilaterally, crackles at bases, tachypneic


Abd: soft/NT/NT/BS+


Ext: left lower extremity in splint, right lower extremity trace edema, chronic 

venous changes; no cyanosis, no clubbing





Labs reviewed. 


   ABG 7.27/80/116/36/96.7% on high flow nasal cannula


   Sodium 133, potassium 5.3, CO2 37, BUN 24 and creatinine 0.66.





Imaging reviewed. 





CTA chest 1/3/2017


   IMPRESSION:  


1. Study compromised by respiratory motion artifact


2. No evidence of acute pulmonary embolism


3. No evidence of pathologic adenopathy


4. 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


5. Dense bilateral lower lobe atelectasis/consolidation. Patchy airspace


opacities are also present within the right upper lobe lingula and right middle


lobe.


6. Small bilateral pleural effusions


7. Severe L1 compression fracture with mild retropulsion.








Medications reviewed.





Assessment & Plan


Acute on chronic hypercapnic hypoxic respiratory failure


COPD (unknown FEV1) in acute exacerbation


Hypertensive urgency


Possible diastolic dysfunction


Electrolyte imbalance


Tobacco use disorder


Morbid obesity





Mrs. Pittman is a 82-year-old female who presents with acute on chronic 

hypercapnic respiratory failure with status post fall and sustaining left 

bimalleolar fracture.  She is on chronic long-term oxygen therapy at 2 L/m 

during the day and 4 L/m at night, for COPD.


She is not doing well from a respiratory standpoint. She is still quite 

tachypneic however improving.  She is noncompliant BiPAP and using high flow 

nasal cannula intermittently.  She still remains in respiratory acidosis.  I 

stressed compliance with BiPAP to help improve her acid base status.








Recommendations


Continue with BiPAP and high flow nasal cannula intermittently


Continue to treat for COPD exacerbation with broad-spectrum antibiotics, 

nebulizers and IV corticosteroids.  We can likely taper Solu-Medrol to 40 mg 

every 12 hours tomorrow.  I agree with diuresis as needed.


Encourage flutter valve and incentive spirometry.


Continue with DVT prophylaxis.


She is not improving significantly the last few days.  If her course remains 

the same recommend palliative consult to address goals of care with family.





I will continue to follow. Discussed with Dr. Hendrix.





Data


Medications:





Current Inpatient Medications








 Medications


  (Trade)  Dose


 Ordered  Sig/Donna


 Route  Start Time


 Stop Time Status Last Admin


Dose Admin


 


 Levalbuterol


  (Xopenex 1.25MG/


 3ML Neb)  1.25 mg  Q6R


 INH  1/1/18 22:30


 1/31/18 22:29  1/5/18 14:05


1.25 MG


 


 Methylprednisolone


 Sodium Succinate


 40 mg/Syringe  0.64 ml @ 


 1.5 mls/min  Q6H


 IV  1/2/18 02:00


 2/1/18 01:59  1/5/18 13:48


1.5 MLS/MIN


 


 Levofloxacin


  (Consult)  1 ea  UD  PRN


 N/A  1/2/18 01:09


 2/1/18 01:08   


 


 


 Tramadol HCl


  (Ultram Tab)  50 mg  Q6H  PRN


 PO  1/1/18 21:30


 1/31/18 21:29  1/2/18 07:55


50 MG


 


 Clonidine HCl


  (Catapres Tab)  0.1 mg  Q6H  PRN


 PO  1/1/18 21:30


 1/31/18 21:29  1/3/18 16:21


0.1 MG


 


 Citalopram


 Hydrobromide


  (celeXA TAB)  40 mg  DAILY


 PO  1/2/18 09:00


 2/1/18 08:59  1/5/18 09:27


40 MG


 


 Latanoprost


  (Xalatan Oph


 Soln)  1 drops  HS


 OP  1/2/18 21:00


 2/1/18 20:59  1/4/18 19:57


1 DROPS


 


 Lisinopril


  (Zestril Tab)  20 mg  DAILY


 PO  1/2/18 09:00


 2/1/18 08:59  1/5/18 09:27


20 MG


 


 Mesalamine


  (Pentasa


 Controlled Rel


 Cap)  1,000 mg  BID


 PO  1/2/18 09:00


 2/1/18 08:59  1/5/18 09:28


1,000 MG


 


 Mirtazapine


  (Remeron Tab)  15 mg  HS


 PO  1/2/18 21:00


 2/1/18 20:59  1/4/18 19:58


15 MG


 


 Pantoprazole


 Sodium


  (Protonix Tab)  40 mg  DAILY


 PO  1/2/18 09:00


 2/1/18 08:59  1/5/18 09:27


40 MG


 


 Senna/Docusate


 Sodium


  (Senokot S Tab)  1 tab  DAILY


 PO  1/2/18 09:00


 2/1/18 08:59  1/5/18 09:27


1 TAB


 


 Simvastatin


  (Zocor Tab)  20 mg  HS


 PO  1/2/18 21:00


 2/1/18 20:59  1/4/18 19:58


20 MG


 


 Heparin Sodium


  (Porcine)


  (Heparin Sq 5000


 Unit/0.5ml)  5,000 unit  Q12H


 SQ  1/2/18 09:00


 2/1/18 08:59  1/5/18 09:21


5,000 UNIT


 


 Acetaminophen


  (Tylenol Tab)  650 mg  Q4H  PRN


 PO  1/1/18 21:45


 1/31/18 21:44  1/3/18 16:22


650 MG


 


 Ondansetron HCl


  (Zofran Inj)  4 mg  Q6H  PRN


 IV  1/1/18 21:45


 1/31/18 21:44   


 


 


 Piperacillin Sod/


 Tazobactam Sod


 4.5 gm/Dextrose  120 ml @ 


 30 mls/hr  Q8H


 IV  1/2/18 02:00


 1/9/18 01:59  1/5/18 16:56


30 MLS/HR


 


 Levofloxacin 750


 mg/Prmx  150 ml @ 


 100 mls/hr  Q24H


 IV  1/2/18 06:00


 1/9/18 05:59  1/5/18 05:45


100 MLS/HR


 


 Piperacillin Sod/


 Tazobactam Sod


  (Consult)  1 ea  UD  PRN


 N/A  1/2/18 01:15


 2/1/18 01:14   


 


 


 Ioversol


  (Optiray 320)  100 ml  UD  PRN


 IV  1/3/18 11:00


 1/7/18 10:59   


 


 


 Amlodipine


 Besylate


  (Norvasc Tab)  5 mg  QAM


 PO  1/6/18 09:00


 2/5/18 08:59   


 








I & O:











24-Hour Column 


 


 1/6/18





 08:00


 


Output Total 700 ml


 


Balance -700 ml








Vital Signs:











  Date Time  Temp Pulse Resp B/P (MAP) Pulse Ox O2 Delivery O2 Flow Rate FiO2


 


1/5/18 15:12 36.6 78 21 172/79 (110) 91 BiPAP  


 


1/5/18 14:07  90   100   100


 


1/5/18 14:05  90 19  100 BiPAP/CPAP  100


 


1/5/18 12:00      BiPAP  


 


1/5/18 11:40 37.0 92 20 174/98 (123) 95   


 


1/5/18 08:00      High Flow Oxygen  95





      Nasal Cannula  


 


1/5/18 07:55  83 23  93 Nasal Cannula 50.0 95


 


1/5/18 07:45 37.0 80 24 173/82 (112) 95 High Flow Oxygen  


 


1/5/18 04:00      BiPAP  


 


1/5/18 03:45 37.1 92 22 166/85 (112) 92 High Flow Oxygen  


 


1/5/18 02:03  95 23  96 Nasal Cannula 50.0 96


 


1/5/18 00:00      BiPAP  


 


1/4/18 23:42 37.1 95 25 157/97 (117) 91 BiPAP  60


 


1/4/18 23:06  97   91   60


 


1/4/18 20:58  80   96   100


 


1/4/18 20:43  93   91   100


 


1/4/18 20:00     93 BiPAP  


 


1/4/18 19:49 36.9 93 20 150/72 (98) 93 High Flow Oxygen  


 


1/4/18 19:29  86 24  95 Nasal Cannula 50.0 96








Laboratory Results:





Last 24 Hours








Test


  1/5/18


06:04 1/5/18


08:44


 


White Blood Count 10.94 K/uL  


 


Red Blood Count 3.56 M/uL  


 


Hemoglobin 10.5 g/dL  


 


Hematocrit 33.7 %  


 


Mean Corpuscular Volume 94.7 fL  


 


Mean Corpuscular Hemoglobin 29.5 pg  


 


Mean Corpuscular Hemoglobin


Concent 31.2 g/dl 


  


 


 


Platelet Count 289 K/uL  


 


Mean Platelet Volume 9.2 fL  


 


Neutrophils (%) (Auto) 81.6 %  


 


Lymphocytes (%) (Auto) 6.5 %  


 


Monocytes (%) (Auto) 9.7 %  


 


Eosinophils (%) (Auto) 0.0 %  


 


Basophils (%) (Auto) 0.3 %  


 


Neutrophils # (Auto) 8.93 K/uL  


 


Lymphocytes # (Auto) 0.71 K/uL  


 


Monocytes # (Auto) 1.06 K/uL  


 


Eosinophils # (Auto) 0.00 K/uL  


 


Basophils # (Auto) 0.03 K/uL  


 


RDW Standard Deviation 48.6 fL  


 


RDW Coefficient of Variation 13.9 %  


 


Immature Granulocyte % (Auto) 1.9 %  


 


Immature Granulocyte # (Auto) 0.21 K/uL  


 


Sodium Level 133 mmol/L  


 


Potassium Level 5.3 mmol/L  


 


Chloride Level 96 mmol/L  


 


Carbon Dioxide Level 37 mmol/L  


 


Anion Gap 0.0 mmol/L  


 


Blood Urea Nitrogen 24 mg/dl  


 


Creatinine 0.66 mg/dl  


 


Est Creatinine Clear Calc


Drug Dose 79.3 ml/min 


  


 


 


Estimated GFR (


American) 95.3 


  


 


 


Estimated GFR (Non-


American 82.3 


  


 


 


BUN/Creatinine Ratio 37.0  


 


Random Glucose 148 mg/dl  


 


Calcium Level 8.5 mg/dl  


 


Arterial Blood pH  7.27 


 


Arterial Blood Partial


Pressure CO2 


  80 mmHg 


 


 


Arterial Blood Partial


Pressure O2 


  116 mm/Hg 


 


 


Arterial Blood HCO3  36 mmol/L 


 


Arterial Blood Oxygen


Saturation 


  96.7 % 


 


 


Arterial Blood Base Excess  6.7 mEq/L 


 


Arterial Blood Gas Delivery  95% 


 


Alberto Test  POS

## 2018-01-05 NOTE — PROGRESS NOTE
Medicine Progress Note


Date & Time of Visit:


Jan 5, 2018 at 09:22.


Subjective


Pt was seen and examined


Lying in bed with mild respiratory distress


I had a long discussion with her daughter today and updated provide


Also daughter awared that pt changed her code status to DNR


Continue to cough


She did not keep the BIPAP on for most of the night


Pt continue to feel drowsy


Denies any chest pain and fever





Objective





Last 8 Hrs








  Date Time  Temp Pulse Resp B/P (MAP) Pulse Ox O2 Delivery O2 Flow Rate FiO2


 


1/5/18 07:55  83 23  93 Nasal Cannula 50.0 95


 


1/5/18 07:45 37.0 80 24 173/82 (112) 95 High Flow Oxygen  


 


1/5/18 04:00      BiPAP  


 


1/5/18 03:45 37.1 92 22 166/85 (112) 92 High Flow Oxygen  


 


1/5/18 02:03  95 23  96 Nasal Cannula 50.0 96








Physical Exam:


General- No acute distress


Head-  atraumatic


Eyes- PERRL, EOMI


ENT- oropharynx clear


Neck- supple, no JVD


Lungs- coarse BS 


Heart- Tachycardia


Abdomen- normal bowel sounds, soft


Extremities- no calf tenderness, Left ankle pain, splint with ace bandage in LE


Neuro- lethargy


Skin- warm & dry


Laboratory Results:





Last 24 Hours








Test


  1/5/18


06:04 1/5/18


08:44


 


White Blood Count 10.94 K/uL  


 


Red Blood Count 3.56 M/uL  


 


Hemoglobin 10.5 g/dL  


 


Hematocrit 33.7 %  


 


Mean Corpuscular Volume 94.7 fL  


 


Mean Corpuscular Hemoglobin 29.5 pg  


 


Mean Corpuscular Hemoglobin


Concent 31.2 g/dl 


  


 


 


Platelet Count 289 K/uL  


 


Mean Platelet Volume 9.2 fL  


 


Neutrophils (%) (Auto) 81.6 %  


 


Lymphocytes (%) (Auto) 6.5 %  


 


Monocytes (%) (Auto) 9.7 %  


 


Eosinophils (%) (Auto) 0.0 %  


 


Basophils (%) (Auto) 0.3 %  


 


Neutrophils # (Auto) 8.93 K/uL  


 


Lymphocytes # (Auto) 0.71 K/uL  


 


Monocytes # (Auto) 1.06 K/uL  


 


Eosinophils # (Auto) 0.00 K/uL  


 


Basophils # (Auto) 0.03 K/uL  


 


RDW Standard Deviation 48.6 fL  


 


RDW Coefficient of Variation 13.9 %  


 


Immature Granulocyte % (Auto) 1.9 %  


 


Immature Granulocyte # (Auto) 0.21 K/uL  


 


Sodium Level 133 mmol/L  


 


Potassium Level 5.3 mmol/L  


 


Chloride Level 96 mmol/L  


 


Carbon Dioxide Level 37 mmol/L  


 


Anion Gap 0.0 mmol/L  


 


Blood Urea Nitrogen 24 mg/dl  


 


Creatinine 0.66 mg/dl  


 


Est Creatinine Clear Calc


Drug Dose 79.3 ml/min 


  


 


 


Estimated GFR (


American) 95.3 


  


 


 


Estimated GFR (Non-


American 82.3 


  


 


 


BUN/Creatinine Ratio 37.0  


 


Random Glucose 148 mg/dl  


 


Calcium Level 8.5 mg/dl  


 


Arterial Blood pH  7.27 


 


Arterial Blood Partial


Pressure CO2 


  80 mmHg 


 


 


Arterial Blood Partial


Pressure O2 


  116 mm/Hg 


 


 


Arterial Blood HCO3  36 mmol/L 


 


Arterial Blood Oxygen


Saturation 


  96.7 % 


 


 


Arterial Blood Base Excess  6.7 mEq/L 


 


Arterial Blood Gas Delivery  95% 


 


Alberto Test  POS 











Assessment & Plan


Acute hypercapnic respiratory failure 


Mostly related to COPD exacerbation vs Pneumonia


CXR showed dense bibasilar airspace consolidation, right greater than left with 

associated pleural effusions.


ABG on admission showed respiratory acidosis with comp


Continue Levaquin and Zosyn


Negative influenza test 


Continue Solumedrol


Continue BIPAP with intermittent  high flow supplement oxygen 


Continue respiratory treatment with Duoneb


Blood cx no growth


ABG done today showed respiratory acidosis with PCO2 80


Not to sure if it due to noncompliant since pt does not want to keep the Bipap 

on or if we need to change the setting. 


Will repeat ABG


Will give lasix x1 dose


monitor closely


CT with PE protocol showed no evidence for PE. Dense bilateral lower lobe 

atelectasis/consolidation. Patchy airspace


opacities are also present within the right upper lobe lingula and right middle 

lobe.


ECHO


* The echocardiogram is extremely technically limited.


* Patient was sitting upright for the test.


* There is no significant pericardial effusion noted.


* The left ventricular systolic function is grossly normal on limited 

visualization.


* The right ventricular systolic function is grossly normal.


* The valves are poorly visualized.


* There is no significant valvular stenosis or regurgitation on technically 

limited Doppler evaluation.





Hyponatremia


Possible related to hypovolemia  


Na on admission 128


Na today 132


Will D/C IVF





Hyperkalemia


K 5.3


Pt is on lisinopril (might not be a good candidate for ACEI due to hyperkalemia)


Will put on a low K diet


If stay high, will give Kayexalate


Continue monitor BMP





Left Ankle fracture s/p fall 


Left ankle xray showed distal tibial and fibular fractures as above with mild 

lateral subluxation of the talus.


Ortho on board


pain control


On 10L oxygen now, high risk for surgery for now


Will need to postpone surgery until respiratory status improves





Right pelvic ring fracture, 


Pelvis xray showed an age indeterminant right pubic ring fracture, possibly 

chronic.


Stable








Hypertension


BP elevated 


Continue lisinopril.  


will add amlodipine


PRN clonidine


Continue monitor BP








Ulcerative colitis


On Pentasa


Stable





Depression


On Celexa


Stable





DVT px 


On heparin.  





CODE STATUS DNR 





DISPOSITION


Continue monitor in tele


Consultants:


Pulmonary


Current Inpatient Medications:





Current Inpatient Medications








 Medications


  (Trade)  Dose


 Ordered  Sig/Donna


 Route  Start Time


 Stop Time Status Last Admin


Dose Admin


 


 Levalbuterol


  (Xopenex 1.25MG/


 3ML Neb)  1.25 mg  Q6R


 INH  1/1/18 22:30


 1/31/18 22:29  1/5/18 07:17


1.25 MG


 


 Methylprednisolone


 Sodium Succinate


 40 mg/Syringe  0.64 ml @ 


 1.5 mls/min  Q6H


 IV  1/2/18 02:00


 2/1/18 01:59  1/5/18 02:15


1.5 MLS/MIN


 


 Levofloxacin


  (Consult)  1 ea  UD  PRN


 N/A  1/2/18 01:09


 2/1/18 01:08   


 


 


 Tramadol HCl


  (Ultram Tab)  50 mg  Q6H  PRN


 PO  1/1/18 21:30


 1/31/18 21:29  1/2/18 07:55


50 MG


 


 Clonidine HCl


  (Catapres Tab)  0.1 mg  Q6H  PRN


 PO  1/1/18 21:30


 1/31/18 21:29  1/3/18 16:21


0.1 MG


 


 Citalopram


 Hydrobromide


  (celeXA TAB)  40 mg  DAILY


 PO  1/2/18 09:00


 2/1/18 08:59  1/4/18 08:00


40 MG


 


 Latanoprost


  (Xalatan Oph


 Soln)  1 drops  HS


 OP  1/2/18 21:00


 2/1/18 20:59  1/4/18 19:57


1 DROPS


 


 Lisinopril


  (Zestril Tab)  20 mg  DAILY


 PO  1/2/18 09:00


 2/1/18 08:59  1/4/18 08:01


20 MG


 


 Mesalamine


  (Pentasa


 Controlled Rel


 Cap)  1,000 mg  BID


 PO  1/2/18 09:00


 2/1/18 08:59  1/4/18 19:59


1,000 MG


 


 Mirtazapine


  (Remeron Tab)  15 mg  HS


 PO  1/2/18 21:00


 2/1/18 20:59  1/4/18 19:58


15 MG


 


 Pantoprazole


 Sodium


  (Protonix Tab)  40 mg  DAILY


 PO  1/2/18 09:00


 2/1/18 08:59  1/4/18 08:00


40 MG


 


 Senna/Docusate


 Sodium


  (Senokot S Tab)  1 tab  DAILY


 PO  1/2/18 09:00


 2/1/18 08:59  1/4/18 08:00


1 TAB


 


 Simvastatin


  (Zocor Tab)  20 mg  HS


 PO  1/2/18 21:00


 2/1/18 20:59  1/4/18 19:58


20 MG


 


 Heparin Sodium


  (Porcine)


  (Heparin Sq 5000


 Unit/0.5ml)  5,000 unit  Q12H


 SQ  1/2/18 09:00


 2/1/18 08:59  1/4/18 19:58


5,000 UNIT


 


 Sodium Chloride  1,000 ml @ 


 80 mls/hr  [id number]


 IV  1/1/18 21:39


 1/31/18 21:38  1/4/18 22:23


80 MLS/HR


 


 Acetaminophen


  (Tylenol Tab)  650 mg  Q4H  PRN


 PO  1/1/18 21:45


 1/31/18 21:44  1/3/18 16:22


650 MG


 


 Ondansetron HCl


  (Zofran Inj)  4 mg  Q6H  PRN


 IV  1/1/18 21:45


 1/31/18 21:44   


 


 


 Piperacillin Sod/


 Tazobactam Sod


 4.5 gm/Dextrose  120 ml @ 


 30 mls/hr  Q8H


 IV  1/2/18 02:00


 1/9/18 01:59  1/5/18 02:15


30 MLS/HR


 


 Levofloxacin 750


 mg/Prmx  150 ml @ 


 100 mls/hr  Q24H


 IV  1/2/18 06:00


 1/9/18 05:59  1/5/18 05:45


100 MLS/HR


 


 Piperacillin Sod/


 Tazobactam Sod


  (Consult)  1 ea  UD  PRN


 N/A  1/2/18 01:15


 2/1/18 01:14   


 


 


 Ioversol


  (Optiray 320)  100 ml  UD  PRN


 IV  1/3/18 11:00


 1/7/18 10:59

## 2018-01-06 VITALS
HEART RATE: 115 BPM | TEMPERATURE: 98.6 F | OXYGEN SATURATION: 95 % | SYSTOLIC BLOOD PRESSURE: 138 MMHG | DIASTOLIC BLOOD PRESSURE: 77 MMHG

## 2018-01-06 VITALS — OXYGEN SATURATION: 95 % | HEART RATE: 108 BPM

## 2018-01-06 VITALS
TEMPERATURE: 97.7 F | HEART RATE: 108 BPM | DIASTOLIC BLOOD PRESSURE: 83 MMHG | SYSTOLIC BLOOD PRESSURE: 164 MMHG | OXYGEN SATURATION: 94 %

## 2018-01-06 VITALS — HEART RATE: 103 BPM | OXYGEN SATURATION: 94 %

## 2018-01-06 VITALS
TEMPERATURE: 97.88 F | SYSTOLIC BLOOD PRESSURE: 163 MMHG | DIASTOLIC BLOOD PRESSURE: 95 MMHG | HEART RATE: 105 BPM | OXYGEN SATURATION: 94 %

## 2018-01-06 VITALS
DIASTOLIC BLOOD PRESSURE: 71 MMHG | HEART RATE: 130 BPM | SYSTOLIC BLOOD PRESSURE: 136 MMHG | OXYGEN SATURATION: 94 % | TEMPERATURE: 98.42 F

## 2018-01-06 VITALS
HEART RATE: 103 BPM | TEMPERATURE: 96.62 F | OXYGEN SATURATION: 94 % | SYSTOLIC BLOOD PRESSURE: 153 MMHG | DIASTOLIC BLOOD PRESSURE: 92 MMHG

## 2018-01-06 VITALS
OXYGEN SATURATION: 91 % | HEART RATE: 106 BPM | DIASTOLIC BLOOD PRESSURE: 79 MMHG | TEMPERATURE: 97.88 F | SYSTOLIC BLOOD PRESSURE: 157 MMHG

## 2018-01-06 VITALS — HEART RATE: 107 BPM | OXYGEN SATURATION: 92 %

## 2018-01-06 VITALS — OXYGEN SATURATION: 91 % | HEART RATE: 96 BPM

## 2018-01-06 VITALS — HEART RATE: 98 BPM | OXYGEN SATURATION: 92 %

## 2018-01-06 VITALS — OXYGEN SATURATION: 92 % | HEART RATE: 98 BPM

## 2018-01-06 LAB
BASOPHILS # BLD: 0.02 K/UL (ref 0–0.2)
BASOPHILS NFR BLD: 0.2 %
BUN SERPL-MCNC: 26 MG/DL (ref 7–18)
CALCIUM SERPL-MCNC: 8.4 MG/DL (ref 8.5–10.1)
CO2 SERPL-SCNC: 39 MMOL/L (ref 21–32)
CREAT SERPL-MCNC: 0.73 MG/DL (ref 0.6–1.2)
EOS ABS #: 0 K/UL (ref 0–0.5)
EOSINOPHIL NFR BLD AUTO: 281 K/UL (ref 130–400)
GLUCOSE SERPL-MCNC: 179 MG/DL (ref 70–99)
HCT VFR BLD CALC: 33.2 % (ref 37–47)
HGB BLD-MCNC: 10.6 G/DL (ref 12–16)
IG#: 0.2 K/UL (ref 0–0.02)
IMM GRANULOCYTES NFR BLD AUTO: 5.5 %
LYMPHOCYTES # BLD: 0.73 K/UL (ref 1.2–3.4)
MCH RBC QN AUTO: 29.6 PG (ref 25–34)
MCHC RBC AUTO-ENTMCNC: 31.9 G/DL (ref 32–36)
MCV RBC AUTO: 92.7 FL (ref 80–100)
MONO ABS #: 0.71 K/UL (ref 0.11–0.59)
MONOCYTES NFR BLD: 5.3 %
NEUT ABS #: 11.67 K/UL (ref 1.4–6.5)
NEUTROPHILS # BLD AUTO: 0 %
NEUTROPHILS NFR BLD AUTO: 87.5 %
PMV BLD AUTO: 9.1 FL (ref 7.4–10.4)
POTASSIUM SERPL-SCNC: 4.5 MMOL/L (ref 3.5–5.1)
PTT PATIENT: 57.4 SECONDS (ref 21–31)
RED CELL DISTRIBUTION WIDTH CV: 13.8 % (ref 11.5–14.5)
RED CELL DISTRIBUTION WIDTH SD: 47 FL (ref 36.4–46.3)
SODIUM SERPL-SCNC: 135 MMOL/L (ref 136–145)
WBC # BLD AUTO: 13.33 K/UL (ref 4.8–10.8)

## 2018-01-06 RX ADMIN — PIPERACILLIN SODIUM, TAZOBACTAM SODIUM SCH MLS/HR: 4; .5 INJECTION, POWDER, LYOPHILIZED, FOR SOLUTION INTRAVENOUS at 08:48

## 2018-01-06 RX ADMIN — CITALOPRAM HYDROBROMIDE SCH MG: 40 TABLET ORAL at 07:53

## 2018-01-06 RX ADMIN — PIPERACILLIN SODIUM, TAZOBACTAM SODIUM SCH MLS/HR: 4; .5 INJECTION, POWDER, LYOPHILIZED, FOR SOLUTION INTRAVENOUS at 02:14

## 2018-01-06 RX ADMIN — METHYLPREDNISOLONE SODIUM SUCCINATE SCH MLS/MIN: 1 INJECTION, POWDER, FOR SOLUTION INTRAMUSCULAR; INTRAVENOUS at 21:23

## 2018-01-06 RX ADMIN — PIPERACILLIN SODIUM, TAZOBACTAM SODIUM SCH MLS/HR: 4; .5 INJECTION, POWDER, LYOPHILIZED, FOR SOLUTION INTRAVENOUS at 17:12

## 2018-01-06 RX ADMIN — HEPARIN SODIUM PRN MLS/HR: 5000 INJECTION, SOLUTION INTRAVENOUS at 08:43

## 2018-01-06 RX ADMIN — LEVALBUTEROL HYDROCHLORIDE SCH MG: 1.25 SOLUTION RESPIRATORY (INHALATION) at 14:17

## 2018-01-06 RX ADMIN — LEVALBUTEROL HYDROCHLORIDE SCH MG: 1.25 SOLUTION RESPIRATORY (INHALATION) at 01:35

## 2018-01-06 RX ADMIN — LEVALBUTEROL HYDROCHLORIDE SCH MG: 1.25 SOLUTION RESPIRATORY (INHALATION) at 19:47

## 2018-01-06 RX ADMIN — ACETYLCYSTEINE SCH ML: 200 SOLUTION ORAL; RESPIRATORY (INHALATION) at 19:47

## 2018-01-06 RX ADMIN — MESALAMINE SCH MG: 250 CAPSULE ORAL at 21:24

## 2018-01-06 RX ADMIN — METHYLPREDNISOLONE SODIUM SUCCINATE SCH MLS/MIN: 1 INJECTION, POWDER, FOR SOLUTION INTRAMUSCULAR; INTRAVENOUS at 02:14

## 2018-01-06 RX ADMIN — LATANOPROST SCH DROPS: 50 SOLUTION/ DROPS OPHTHALMIC at 21:24

## 2018-01-06 RX ADMIN — STANDARDIZED SENNA CONCENTRATE AND DOCUSATE SODIUM SCH TAB: 8.6; 5 TABLET ORAL at 07:53

## 2018-01-06 RX ADMIN — PANTOPRAZOLE SCH MG: 40 TABLET, DELAYED RELEASE ORAL at 07:53

## 2018-01-06 RX ADMIN — LEVALBUTEROL HYDROCHLORIDE SCH MG: 1.25 SOLUTION RESPIRATORY (INHALATION) at 06:57

## 2018-01-06 RX ADMIN — MIRTAZAPINE SCH MG: 15 TABLET, FILM COATED ORAL at 21:24

## 2018-01-06 RX ADMIN — MESALAMINE SCH MG: 250 CAPSULE ORAL at 07:53

## 2018-01-06 RX ADMIN — LEVOFLOXACIN SCH MLS/HR: 5 INJECTION, SOLUTION INTRAVENOUS at 06:03

## 2018-01-06 RX ADMIN — SIMVASTATIN SCH MG: 20 TABLET, FILM COATED ORAL at 21:24

## 2018-01-06 RX ADMIN — METHYLPREDNISOLONE SODIUM SUCCINATE SCH MLS/MIN: 1 INJECTION, POWDER, FOR SOLUTION INTRAMUSCULAR; INTRAVENOUS at 07:53

## 2018-01-06 NOTE — PULMONOLOGY PROGRESS NOTE
Pulmonary Progress Note


Date of Service


Jan 6, 2018.





Attending


Dr. Fuentes





Subjective


Patient seen and admitted bedside.  States she's feeling much better than 

yesterday.


She is less short of breath.  She still has cough that is nonproductive.  She 

denies any chest pain.  


Overnight, she converted to atrial fibrillation/flutter.  She was given 

Cardizem IV and started on Cardizem by mouth.  She is now on heparin drip for 

anticoagulation.





Objective


VS reviewed.  MAXIMUM TEMPERATURE 36.9, //83, P , RR 19-32, 

SaO2 91- 95 % on HFNC 50 L/m, 70%.  1 L negative since admission


Gen: She is awake alert oriented 3.  Still tachypneic with mild use of 

accessory muscles of respiration


CVS: S1, S2, irregularly irregular, tachycardic


Lungs: decreased breath sound bilaterally, crackles at bases, tachypneic


Abd: soft/NT/NT/BS+


Ext: left lower extremity in splint, right lower extremity trace edema, chronic 

venous changes; no cyanosis, no clubbing





Labs reviewed. 


   ABG 01/06/2018--7.41/63/71/39/92.1% on 70% FiO2 BiPAP


   ABG 01/05/2018--7.27/80/116/36/96.7% on high flow nasal cannula


   Sodium 135, potassium 4.5, CO2 39, BUN 26 and creatinine 0.73


   White blood cell count 13.3 from 10.94, hemoglobin 10.06, platelet count 281 

and stable








Imaging reviewed. 





CTA chest 1/3/2017


   IMPRESSION:  


1. Study compromised by respiratory motion artifact


2. No evidence of acute pulmonary embolism


3. No evidence of pathologic adenopathy


4. 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


5. Dense bilateral lower lobe atelectasis/consolidation. Patchy airspace


opacities are also present within the right upper lobe lingula and right middle


lobe.


6. Small bilateral pleural effusions


7. Severe L1 compression fracture with mild retropulsion.








Medications reviewed.





Assessment & Plan


Acute on chronic hypercapnic hypoxic respiratory failure


COPD (unknown FEV1) in acute exacerbation


Hypertensive urgency


Possible diastolic dysfunction


Electrolyte imbalance


Tobacco use disorder


Morbid obesity


Atrial fibrillation





Mrs. Pittman is a 82-year-old female who presents with acute on chronic 

hypercapnic respiratory failure with status post fall and sustaining left 

bimalleolar fracture.  She is on chronic long-term oxygen therapy at 2 L/m 

during the day and 4 L/m at night, for COPD. Mrs. Pittman shows improvement in 

her acid base status.  Still with hypercarbia however pH is within normal 

limits.  She is more alert today however still requires increased amounts of 

oxygen to maintain saturation.  She also converted over atrial fibrillation 

last night likely secondary to hypoxemia.





Recommendations


Continue with BiPAP and high flow nasal cannula intermittently.  She should use 

BiPAP at night as well


Continue to treat for COPD exacerbation with broad-spectrum antibiotics, 

nebulizers and IV corticosteroids.  I have tapered her over to Solu-Medrol 

every 12 IV today.  Continue with diuresis.  Currently on heparin drip as well 

as antiarrhythmics per cardiology recommendations who are following closely


Encourage flutter valve and incentive spirometry.


Continue with DVT prophylaxis.


I have ordered chest vest assists with aggressive pulmonary toilet as well as 

Mucomyst twice a day


I will continue to follow. Discussed with Dr. Hendrix.





Data


Medications:





Current Inpatient Medications








 Medications


  (Trade)  Dose


 Ordered  Sig/Donna


 Route  Start Time


 Stop Time Status Last Admin


Dose Admin


 


 Levalbuterol


  (Xopenex 1.25MG/


 3ML Neb)  1.25 mg  Q6R


 INH  1/1/18 22:30


 1/31/18 22:29  1/6/18 06:57


1.25 MG


 


 Methylprednisolone


 Sodium Succinate


 40 mg/Syringe  0.64 ml @ 


 1.5 mls/min  Q6H


 IV  1/2/18 02:00


 2/1/18 01:59  1/6/18 07:53


1.5 MLS/MIN


 


 Levofloxacin


  (Consult)  1 ea  UD  PRN


 N/A  1/2/18 01:09


 2/1/18 01:08   


 


 


 Tramadol HCl


  (Ultram Tab)  50 mg  Q6H  PRN


 PO  1/1/18 21:30


 1/31/18 21:29  1/2/18 07:55


50 MG


 


 Clonidine HCl


  (Catapres Tab)  0.1 mg  Q6H  PRN


 PO  1/1/18 21:30


 1/31/18 21:29 Future Hold 1/3/18 16:21


0.1 MG


 


 Citalopram


 Hydrobromide


  (celeXA TAB)  40 mg  DAILY


 PO  1/2/18 09:00


 2/1/18 08:59  1/6/18 07:53


40 MG


 


 Latanoprost


  (Xalatan Oph


 Soln)  1 drops  HS


 OP  1/2/18 21:00


 2/1/18 20:59  1/5/18 21:41


1 DROPS


 


 Lisinopril


  (Zestril Tab)  20 mg  DAILY


 PO  1/2/18 09:00


 2/1/18 08:59 Future Hold 1/5/18 09:27


20 MG


 


 Mesalamine


  (Pentasa


 Controlled Rel


 Cap)  1,000 mg  BID


 PO  1/2/18 09:00


 2/1/18 08:59  1/6/18 07:53


1,000 MG


 


 Mirtazapine


  (Remeron Tab)  15 mg  HS


 PO  1/2/18 21:00


 2/1/18 20:59  1/5/18 21:40


15 MG


 


 Pantoprazole


 Sodium


  (Protonix Tab)  40 mg  DAILY


 PO  1/2/18 09:00


 2/1/18 08:59  1/6/18 07:53


40 MG


 


 Senna/Docusate


 Sodium


  (Senokot S Tab)  1 tab  DAILY


 PO  1/2/18 09:00


 2/1/18 08:59  1/6/18 07:53


1 TAB


 


 Simvastatin


  (Zocor Tab)  20 mg  HS


 PO  1/2/18 21:00


 2/1/18 20:59  1/5/18 21:40


20 MG


 


 Acetaminophen


  (Tylenol Tab)  650 mg  Q4H  PRN


 PO  1/1/18 21:45


 1/31/18 21:44  1/3/18 16:22


650 MG


 


 Ondansetron HCl


  (Zofran Inj)  4 mg  Q6H  PRN


 IV  1/1/18 21:45


 1/31/18 21:44   


 


 


 Piperacillin Sod/


 Tazobactam Sod


 4.5 gm/Dextrose  120 ml @ 


 30 mls/hr  Q8H


 IV  1/2/18 02:00


 1/9/18 01:59  1/6/18 08:48


30 MLS/HR


 


 Levofloxacin 750


 mg/Prmx  150 ml @ 


 100 mls/hr  Q24H


 IV  1/2/18 06:00


 1/9/18 05:59  1/6/18 06:03


100 MLS/HR


 


 Piperacillin Sod/


 Tazobactam Sod


  (Consult)  1 ea  UD  PRN


 N/A  1/2/18 01:15


 2/1/18 01:14   


 


 


 Ioversol


  (Optiray 320)  100 ml  UD  PRN


 IV  1/3/18 11:00


 1/7/18 10:59   


 


 


 Diltiazem HCl


  (Cardizem Cd Cap)  180 mg  QAM


 PO  1/7/18 09:00


 2/5/18 08:59   


 


 


 Heparin Sodium/


 Dextrose  500 ml @ 


 28 mls/hr  [id number] PRN


 IV  1/6/18 08:30


 2/5/18 08:29  1/6/18 08:43


28 MLS/HR








Vital Signs:











  Date Time  Temp Pulse Resp B/P (MAP) Pulse Ox O2 Delivery O2 Flow Rate FiO2


 


1/6/18 08:01      BiPAP  


 


1/6/18 07:23 36.9 130 20 136/71 (92) 94 BiPAP  


 


1/6/18 06:58  103   94   70


 


1/6/18 06:57  103 32  94 BiPAP/CPAP  70


 


1/6/18 04:00      BiPAP  


 


1/6/18 03:33 36.5 108 19 164/83 (110) 94 CPAP  


 


1/6/18 01:36  108 28  95 BiPAP/CPAP  70


 


1/6/18 00:02  96   91   70


 


1/6/18 00:00      BiPAP  


 


1/5/18 23:33 37.1 97 25 112/80 (91) 92 High Flow Oxygen  


 


1/5/18 23:16    164/98 (120)    


 


1/5/18 20:35    112/60 (77)    


 


1/5/18 20:00      BiPAP  


 


1/5/18 19:43  144      


 


1/5/18 19:26 37.1 146 19 130/76 (94) 88 High Flow Oxygen  


 


1/5/18 19:01  96 20  87 Nasal Cannula 50.0 95


 


1/5/18 16:00      BiPAP  


 


1/5/18 15:12 36.6 78 21 172/79 (110) 91 BiPAP  


 


1/5/18 14:07  90   100   100


 


1/5/18 14:05  90 19  100 BiPAP/CPAP  100


 


1/5/18 12:00      BiPAP  


 


1/5/18 11:40 37.0 92 20 174/98 (123) 95   








Laboratory Results:





Last 24 Hours








Test


  1/5/18


19:52 1/6/18


06:02


 


Activated Partial


Thromboplast Time 25.4 SECONDS 


  


 


 


Partial Thromboplastin Ratio 1.0  


 


Arterial Blood pH 7.42  7.41 


 


Arterial Blood Partial


Pressure CO2 59 mmHg 


  63 mmHg 


 


 


Arterial Blood Partial


Pressure O2 62 mm/Hg 


  71 mm/Hg 


 


 


Arterial Blood HCO3 37 mmol/L  39 mmol/L 


 


Arterial Blood Oxygen


Saturation 90.9 % 


  92.1 % 


 


 


Arterial Blood Base Excess 11.0 mEq/L  11.8 mEq/L 


 


Arterial Blood Gas Delivery 97%  70% BIPAP 


 


Alberto Test POS  POS 


 


Sodium Level 132 mmol/L  135 mmol/L 


 


Potassium Level 4.5 mmol/L  4.5 mmol/L 


 


Chloride Level 92 mmol/L  93 mmol/L 


 


Carbon Dioxide Level 39 mmol/L  39 mmol/L 


 


Anion Gap 1.0 mmol/L  3.0 mmol/L 


 


Blood Urea Nitrogen 25 mg/dl  26 mg/dl 


 


Creatinine 0.81 mg/dl  0.73 mg/dl 


 


Est Creatinine Clear Calc


Drug Dose 64.6 ml/min 


  72.6 ml/min 


 


 


Estimated GFR (


American) 78.4 


  88.9 


 


 


Estimated GFR (Non-


American 67.6 


  76.7 


 


 


BUN/Creatinine Ratio 30.7  36.0 


 


Random Glucose 185 mg/dl  179 mg/dl 


 


Lactic Acid Level 1.2 mmol/L  


 


Calcium Level 8.5 mg/dl  8.4 mg/dl 


 


Magnesium Level 2.1 mg/dl  


 


Troponin I 0.032 ng/ml  


 


Thyroid Stimulating Hormone


(TSH) 0.206 uIu/ml 


  


 


 


Free Thyroxine 1.31 ng/dl  


 


White Blood Count  13.33 K/uL 


 


Red Blood Count  3.58 M/uL 


 


Hemoglobin  10.6 g/dL 


 


Hematocrit  33.2 % 


 


Mean Corpuscular Volume  92.7 fL 


 


Mean Corpuscular Hemoglobin  29.6 pg 


 


Mean Corpuscular Hemoglobin


Concent 


  31.9 g/dl 


 


 


Platelet Count  281 K/uL 


 


Mean Platelet Volume  9.1 fL 


 


Neutrophils (%) (Auto)  87.5 % 


 


Lymphocytes (%) (Auto)  5.5 % 


 


Monocytes (%) (Auto)  5.3 % 


 


Eosinophils (%) (Auto)  0.0 % 


 


Basophils (%) (Auto)  0.2 % 


 


Neutrophils # (Auto)  11.67 K/uL 


 


Lymphocytes # (Auto)  0.73 K/uL 


 


Monocytes # (Auto)  0.71 K/uL 


 


Eosinophils # (Auto)  0.00 K/uL 


 


Basophils # (Auto)  0.02 K/uL 


 


RDW Standard Deviation  47.0 fL 


 


RDW Coefficient of Variation  13.8 % 


 


Immature Granulocyte % (Auto)  1.5 % 


 


Immature Granulocyte # (Auto)  0.20 K/uL 


 


Total Triiodothyronine  0.41 ng/ml

## 2018-01-06 NOTE — PROGRESS NOTE
Medicine Progress Note


Date & Time of Visit:


Jan 6, 2018 at 10:25.


Subjective


Pt was seen and examined


Lying in bed with no distress


Pt is more awake today


she said that she feels her breathing is slightly better


denies any chest pain, palpitation





Objective





Last 8 Hrs








  Date Time  Temp Pulse Resp B/P (MAP) Pulse Ox O2 Delivery O2 Flow Rate FiO2


 


1/6/18 19:49  107 28  92 Nasal Cannula 50.0 97


 


1/6/18 16:24      BiPAP  


 


1/6/18 15:11 35.9 103 18 153/92 (112) 94 BiPAP  


 


1/6/18 14:19  98   92   70


 


1/6/18 14:18  98 26  92 BiPAP/CPAP  70








Physical Exam:


General- No acute distress


Head-  atraumatic


Eyes- PERRL, EOMI


ENT- oropharynx clear


Neck- supple, no JVD


Lungs- coarse BS 


Heart- irregular


Abdomen- normal bowel sounds, soft


Extremities- no calf tenderness, Left ankle pain, splint with ace bandage in LE


Neuro- lethargy


Skin- warm & dry


Laboratory Results:





Last 24 Hours








Test


  1/6/18


06:02 1/6/18


15:30


 


White Blood Count 13.33 K/uL  


 


Red Blood Count 3.58 M/uL  


 


Hemoglobin 10.6 g/dL  


 


Hematocrit 33.2 %  


 


Mean Corpuscular Volume 92.7 fL  


 


Mean Corpuscular Hemoglobin 29.6 pg  


 


Mean Corpuscular Hemoglobin


Concent 31.9 g/dl 


  


 


 


Platelet Count 281 K/uL  


 


Mean Platelet Volume 9.1 fL  


 


Neutrophils (%) (Auto) 87.5 %  


 


Lymphocytes (%) (Auto) 5.5 %  


 


Monocytes (%) (Auto) 5.3 %  


 


Eosinophils (%) (Auto) 0.0 %  


 


Basophils (%) (Auto) 0.2 %  


 


Neutrophils # (Auto) 11.67 K/uL  


 


Lymphocytes # (Auto) 0.73 K/uL  


 


Monocytes # (Auto) 0.71 K/uL  


 


Eosinophils # (Auto) 0.00 K/uL  


 


Basophils # (Auto) 0.02 K/uL  


 


RDW Standard Deviation 47.0 fL  


 


RDW Coefficient of Variation 13.8 %  


 


Immature Granulocyte % (Auto) 1.5 %  


 


Immature Granulocyte # (Auto) 0.20 K/uL  


 


Arterial Blood pH 7.41  


 


Arterial Blood Partial


Pressure CO2 63 mmHg 


  


 


 


Arterial Blood Partial


Pressure O2 71 mm/Hg 


  


 


 


Arterial Blood HCO3 39 mmol/L  


 


Arterial Blood Oxygen


Saturation 92.1 % 


  


 


 


Arterial Blood Base Excess 11.8 mEq/L  


 


Arterial Blood Gas Delivery 70% BIPAP  


 


Alberto Test POS  


 


Sodium Level 135 mmol/L  


 


Potassium Level 4.5 mmol/L  


 


Chloride Level 93 mmol/L  


 


Carbon Dioxide Level 39 mmol/L  


 


Anion Gap 3.0 mmol/L  


 


Blood Urea Nitrogen 26 mg/dl  


 


Creatinine 0.73 mg/dl  


 


Est Creatinine Clear Calc


Drug Dose 72.6 ml/min 


  


 


 


Estimated GFR (


American) 88.9 


  


 


 


Estimated GFR (Non-


American 76.7 


  


 


 


BUN/Creatinine Ratio 36.0  


 


Random Glucose 179 mg/dl  


 


Calcium Level 8.4 mg/dl  


 


Total Triiodothyronine 0.41 ng/ml  


 


Activated Partial


Thromboplast Time 


  57.4 SECONDS 


 


 


Partial Thromboplastin Ratio  2.2 











Assessment & Plan


Acute hypercapnic respiratory failure 


Mostly related to COPD exacerbation vs Pneumonia


CXR showed dense bibasilar airspace consolidation, right greater than left with 

associated pleural effusions.


ABG on admission showed respiratory acidosis with comp


Continue Levaquin and Zosyn


Negative influenza test 


Continue Solumedrol


Continue BIPAP with intermittent  high flow supplement oxygen 


Continue respiratory treatment with Duoneb


Blood cx no growth


ABG done today showed respiratory acidosis with PCO2 80


Not to sure if it due to noncompliant since pt does not want to keep the Bipap 

on or if we need to change the setting. 


Will repeat ABG


monitor closely


CT with PE protocol showed no evidence for PE. Dense bilateral lower lobe 

atelectasis/consolidation. Patchy airspace


opacities are also present within the right upper lobe lingula and right middle 

lobe.


1/6


Clinically improved slightly


continue using BIPAP at night 


Received lasix last night and diuresis well


Continue abx


Continue respiratory treatment


continue Solumedrol


ECHO


* The echocardiogram is extremely technically limited.


* Patient was sitting upright for the test.


* There is no significant pericardial effusion noted.


* The left ventricular systolic function is grossly normal on limited 

visualization.


* The right ventricular systolic function is grossly normal.


* The valves are poorly visualized.


* There is no significant valvular stenosis or regurgitation on technically 

limited Doppler evaluation.





Paroxysmal atrial flutter with rapid ventricular response


Due to acute hypercapnic respiratory failure


received Cardizem and digoxin


Continue cardizem 180 mg daily


starting on heparin drip


continue monitor in tele





Hyponatremia


Possible related to hypovolemia  


Na on admission 128


Na today 135


Improved





Hyperkalemia


K 4.5


Pt is on lisinopril (might not be a good candidate for ACEI due to hyperkalemia)


Will put on a low K diet


If stay high, will give Kayexalate


Continue monitor BMP


stable





Left Ankle fracture s/p fall 


Left ankle xray showed distal tibial and fibular fractures as above with mild 

lateral subluxation of the talus.


Ortho on board


pain control


On 10L oxygen now, high risk for surgery for now


Will need to postpone surgery until respiratory status improves





Right pelvic ring fracture, 


Pelvis xray showed an age indeterminant right pubic ring fracture, possibly 

chronic.


Stable








Hypertension


BP elevated 


Continue lisinopril.  


will add amlodipine


PRN clonidine


Continue monitor BP








Ulcerative colitis


On Pentasa


Stable





Depression


On Celexa


Stable





DVT px 


On heparin drip





CODE STATUS DNR 





DISPOSITION


Continue monitor in tele


Consultants:


Pulmonary


Current Inpatient Medications:





Current Inpatient Medications








 Medications


  (Trade)  Dose


 Ordered  Sig/Donna


 Route  Start Time


 Stop Time Status Last Admin


Dose Admin


 


 Levalbuterol


  (Xopenex 1.25MG/


 3ML Neb)  1.25 mg  Q6R


 INH  1/1/18 22:30


 1/31/18 22:29  1/6/18 19:47


1.25 MG


 


 Levofloxacin


  (Consult)  1 ea  UD  PRN


 N/A  1/2/18 01:09


 2/1/18 01:08   


 


 


 Tramadol HCl


  (Ultram Tab)  50 mg  Q6H  PRN


 PO  1/1/18 21:30


 1/31/18 21:29  1/2/18 07:55


50 MG


 


 Clonidine HCl


  (Catapres Tab)  0.1 mg  Q6H  PRN


 PO  1/1/18 21:30


 1/31/18 21:29 Future Hold 1/3/18 16:21


0.1 MG


 


 Citalopram


 Hydrobromide


  (celeXA TAB)  40 mg  DAILY


 PO  1/2/18 09:00


 2/1/18 08:59  1/6/18 07:53


40 MG


 


 Latanoprost


  (Xalatan Oph


 Soln)  1 drops  HS


 OP  1/2/18 21:00


 2/1/18 20:59  1/5/18 21:41


1 DROPS


 


 Lisinopril


  (Zestril Tab)  20 mg  DAILY


 PO  1/2/18 09:00


 2/1/18 08:59 Future Hold 1/5/18 09:27


20 MG


 


 Mesalamine


  (Pentasa


 Controlled Rel


 Cap)  1,000 mg  BID


 PO  1/2/18 09:00


 2/1/18 08:59  1/6/18 07:53


1,000 MG


 


 Mirtazapine


  (Remeron Tab)  15 mg  HS


 PO  1/2/18 21:00


 2/1/18 20:59  1/5/18 21:40


15 MG


 


 Pantoprazole


 Sodium


  (Protonix Tab)  40 mg  DAILY


 PO  1/2/18 09:00


 2/1/18 08:59  1/6/18 07:53


40 MG


 


 Senna/Docusate


 Sodium


  (Senokot S Tab)  1 tab  DAILY


 PO  1/2/18 09:00


 2/1/18 08:59  1/6/18 07:53


1 TAB


 


 Simvastatin


  (Zocor Tab)  20 mg  HS


 PO  1/2/18 21:00


 2/1/18 20:59  1/5/18 21:40


20 MG


 


 Acetaminophen


  (Tylenol Tab)  650 mg  Q4H  PRN


 PO  1/1/18 21:45


 1/31/18 21:44  1/3/18 16:22


650 MG


 


 Ondansetron HCl


  (Zofran Inj)  4 mg  Q6H  PRN


 IV  1/1/18 21:45


 1/31/18 21:44   


 


 


 Piperacillin Sod/


 Tazobactam Sod


 4.5 gm/Dextrose  120 ml @ 


 30 mls/hr  Q8H


 IV  1/2/18 02:00


 1/9/18 01:59  1/6/18 17:12


30 MLS/HR


 


 Levofloxacin 750


 mg/Prmx  150 ml @ 


 100 mls/hr  Q24H


 IV  1/2/18 06:00


 1/9/18 05:59  1/6/18 06:03


100 MLS/HR


 


 Piperacillin Sod/


 Tazobactam Sod


  (Consult)  1 ea  UD  PRN


 N/A  1/2/18 01:15


 2/1/18 01:14   


 


 


 Ioversol


  (Optiray 320)  100 ml  UD  PRN


 IV  1/3/18 11:00


 1/7/18 10:59   


 


 


 Diltiazem HCl


  (Cardizem Cd Cap)  180 mg  QAM


 PO  1/7/18 09:00


 2/5/18 08:59   


 


 


 Heparin Sodium/


 Dextrose  500 ml @ 


 28 mls/hr  [id number] PRN


 IV  1/6/18 08:30


 2/5/18 08:29  1/6/18 08:43


28 MLS/HR


 


 Methylprednisolone


 Sodium Succinate


 40 mg/Syringe  0.64 ml @ 


 1.5 mls/min  Q12H


 IV  1/6/18 20:00


 2/5/18 19:59   


 


 


 Acetylcysteine


  (Mucomyst 20%


 Inh Soln)  3 ml  BIDR


 INH  1/6/18 20:00


 2/5/18 19:59  1/6/18 19:47


3 ML

## 2018-01-06 NOTE — ORTHOPEDIC PROGRESS NOTE
Orthopedic Progress Note


Date of Service


Jan 6, 2018.





Subjective


Additional Notes:


pain in ankle tolerable, currently rates as 2/10





Objective


splint C/D/I, A&O x3, toes mobile











  Date Time  Temp Pulse Resp B/P (MAP) Pulse Ox O2 Delivery O2 Flow Rate FiO2


 


1/6/18 06:58  103   94   70


 


1/6/18 06:57  103 32  94 BiPAP/CPAP  70


 


1/6/18 04:00      BiPAP  


 


1/6/18 03:33 36.5 108 19 164/83 (110) 94 CPAP  


 


1/6/18 01:36  108 28  95 BiPAP/CPAP  70


 


1/6/18 00:02  96   91   70


 


1/6/18 00:00      BiPAP  


 


1/5/18 23:33 37.1 97 25 112/80 (91) 92 High Flow Oxygen  


 


1/5/18 23:16    164/98 (120)    


 


1/5/18 20:35    112/60 (77)    


 


1/5/18 20:00      BiPAP  


 


1/5/18 19:43  144      


 


1/5/18 19:26 37.1 146 19 130/76 (94) 88 High Flow Oxygen  


 


1/5/18 19:01  96 20  87 Nasal Cannula 50.0 95


 


1/5/18 16:00      BiPAP  


 


1/5/18 15:12 36.6 78 21 172/79 (110) 91 BiPAP  


 


1/5/18 14:07  90   100   100


 


1/5/18 14:05  90 19  100 BiPAP/CPAP  100


 


1/5/18 12:00      BiPAP  


 


1/5/18 11:40 37.0 92 20 174/98 (123) 95   








Laboratory Results 24 Hours:











Test


  1/6/18


06:02


 


White Blood Count 13.33 K/uL 


 


Red Blood Count 3.58 M/uL 


 


Hemoglobin 10.6 g/dL 


 


Hematocrit 33.2 % 


 


Mean Corpuscular Volume 92.7 fL 


 


Mean Corpuscular Hemoglobin 29.6 pg 


 


Mean Corpuscular Hemoglobin


Concent 31.9 g/dl 


 


 


Platelet Count 281 K/uL 


 


Mean Platelet Volume 9.1 fL 


 


Neutrophils (%) (Auto) 87.5 % 


 


Lymphocytes (%) (Auto) 5.5 % 


 


Monocytes (%) (Auto) 5.3 % 


 


Eosinophils (%) (Auto) 0.0 % 


 


Basophils (%) (Auto) 0.2 % 


 


Neutrophils # (Auto) 11.67 K/uL 


 


Lymphocytes # (Auto) 0.73 K/uL 


 


Monocytes # (Auto) 0.71 K/uL 


 


Eosinophils # (Auto) 0.00 K/uL 


 


Basophils # (Auto) 0.02 K/uL 











Assessment & Plan


Assessment:


Left Bimalleolar ankle fracture, will need ORIF, however, with her current


status will wait until she is medically cleared. remain NWB, ice/elevate, 

remain in splint, it was checked this am and no issues noted. plan will be for 

ORIF once medically cleared. please contact us when she is felt to be medically 

stable for surgery, or if she experiences increased pain or issues with the 

splint.

## 2018-01-06 NOTE — CARDIOLOGY CONSULTATION
DATE OF CONSULTATION:  01/05/2018

 

REFERRING PHYSICIAN:  Dr. Danilo Spring.

 

REASON FOR CONSULTATION:  Atrial flutter.

 

HISTORY OF PRESENT ILLNESS:  Ms. Pittman is an 82-year-old female who is

admitted with exacerbation of COPD, hypercapnic respiratory failure, and

pneumonia.  The patient was initially acidotic on admission, 01/01/2018.  She

has been treated with BiPAP and her blood gas has improved.  CO2 remains

elevated; however, she is no longer acidotic.  The patient developed atrial

flutter with rapid ventricular response yesterday at approximately 07:00 p.m.

 She was treated with multiple boluses of intravenous diltiazem as well as

120 mg of oral diltiazem.  Her heart rates have improved overnight.  This

morning, her heart rate is averaging approximately 110 beats per minute.  She

is unaware of any palpitations or tachycardia.  Denies chest discomfort or

unusual shortness of breath.  She is awake and alert on BiPAP.  She states

she chronically utilizes 2 liters of oxygen during the day as well as 4

liters at night at home.  This is due to a history of underlying severe COPD.

 Unfortunately, she continues to smoke cigarettes prior to admission.  She

denies personal history of coronary artery disease, congestive heart failure,

dysrhythmia, or rheumatic fever as a child.  A resting 2D transthoracic echo

was performed on January 3rd, which was technically limited, grossly normal

left and right ventricular function noted without significant valvular

disease.  There was no pericardial effusion.  Anticoagulation was not

initiated last evening.  The patient also noted to have a left-sided ankle

fracture on admission due to a mechanical fall.

 

REVIEW OF SYSTEMS:  The pertinent positives noted above.  A comprehensive

10-system review is otherwise negative.

 

PAST MEDICAL HISTORY:

1.  Severe oxygen dependent COPD.

2.  Hypertension.

3.  Ulcerative colitis.

4.  Depression.

5.  Dyslipidemia.

6.  History of left-sided carotid endarterectomy.

7.  Chart history of TIA/CVA.

8.  Chart history of supraventricular tachycardia.

 

PAST SURGICAL HISTORY:  Left-sided carotid endarterectomy, colonoscopy, EGD,

knee surgery, hip replacement on the right side and hysterectomy.

 

SOCIAL HISTORY:  Active tobacco abuse, greater than 50-pack-year history. 

She is .

 

FAMILY HISTORY:  Negative for premature CAD or sudden cardiac death.

 

OUTPATIENT MEDICATIONS:

1.  Combivent 4 times daily.

2.  Lisinopril 20 mg daily.

3.  Lasix 20 mg daily.

4.  Aspirin 81 mg daily.

5.  Pentasa 1000 mg b.i.d.

6.  Celexa 40 mg daily.

7.  Mirtazapine 50 mg daily.

8.  Simvastatin 20 mg daily.

9.  Protonix 40 mg daily.

 

ALLERGIES:  SULFA, SULFAMETHOXAZOLE AND TRIMETHOPRIM.

 

ECG demonstrates atrial flutter at a rate of 148 beats per minute.

 

LABORATORY DATA:  Initial troponin 0.032.  White blood cell count 13.33,

hemoglobin is 10.6, and platelet count is 281.

 

ABG on 01/06/2018, 7.41/53/71/92% on 70% BiPAP.

 

Sodium is 135, potassium is 4.0, chloride is 93, CO2 is 39, BUN is 26, and

creatinine is 0.73.

 

PHYSICAL EXAMINATION:

VITAL SIGNS:  Temperature is 36.5 degrees centigrade, pulse 107 beats per

minute and irregular, respiratory rate is 22 breaths per minute, blood

pressure 136/71, and SaO2 is 96% on BIPAP with 70% FiO2.

GENERAL:  Chronically ill, in no acute distress, awake, on BiPAP.

HEENT:  Mucous membranes are moist.  No scleral icterus.  Conjunctivae pink.

NECK:  Supple.  There is no JVD, no HJR and no carotid bruit.

HEART:  Irregular and tachycardic with a normal S1 and S2.  There is no

murmur, rub, or gallop.

LUNGS:  Demonstrate crackles at the bases bilaterally with diminished breath

sounds at the bases.  No wheezing appreciated.

ABDOMEN:  Obese and nontender.  No rebound or guarding.  Normal bowel sounds.

EXTREMITIES:  Warm and dry.  There is no clubbing, cyanosis, or edema.  There

is mild ecchymosis of the right hand noted.

NEUROLOGIC:  Demonstrates no focal motor deficit.

 

FINAL IMPRESSION:

1.  Paroxysmal atrial flutter with rapid ventricular response, likely related

to underlying respiratory insufficiency, hypercapnic respiratory failure,

chronic obstructive pulmonary disease exacerbation, and pneumonia.

2.  Hypertension -- controlled.

3.  Acute diastolic heart failure with preserved LV and RV function.

4.  Normocytic anemia.

5.  Hyponatremia.

6.  Left ankle fracture, status post full.

7.  Ulcerative colitis.

 

PLAN AND RECOMMENDATIONS:  The patient has received doses of intravenous

diltiazem, intravenous digoxin as well as oral diltiazem.  Totals diltiazem

dose thus far was 300 mg.  Her rates have improved; however, remain elevated.

 I am going to initiate intravenous heparin.  Subcutaneous Lovenox will be

discontinued.  We will continue to monitor her heart rate on telemetry

closely.  We will utilize doses of intravenous diltiazem for heart rates

sustained greater than 130 beats per minute.  The patient currently is

hemodynamically stable and asymptomatic in regard to her atrial flutter.  I

suspect that her flutter will improve or resolve as her respiratory issues

are treated.  We will continue to monitor volume status closely as she has

received multiple IV medications including antibiotic therapy on a daily

basis.  She has diuresed well with intravenous Lasix overnight for a total

urine output of approximately 3 liters.  We will continue to follow closely

during hospitalization.

 

 

 

ÁNGEL

## 2018-01-07 VITALS
TEMPERATURE: 97.16 F | OXYGEN SATURATION: 96 % | HEART RATE: 88 BPM | DIASTOLIC BLOOD PRESSURE: 72 MMHG | SYSTOLIC BLOOD PRESSURE: 144 MMHG

## 2018-01-07 VITALS
DIASTOLIC BLOOD PRESSURE: 113 MMHG | OXYGEN SATURATION: 96 % | TEMPERATURE: 98.6 F | SYSTOLIC BLOOD PRESSURE: 178 MMHG | HEART RATE: 94 BPM

## 2018-01-07 VITALS — OXYGEN SATURATION: 93 % | HEART RATE: 103 BPM

## 2018-01-07 VITALS
TEMPERATURE: 98.06 F | HEART RATE: 91 BPM | OXYGEN SATURATION: 95 % | DIASTOLIC BLOOD PRESSURE: 83 MMHG | SYSTOLIC BLOOD PRESSURE: 152 MMHG

## 2018-01-07 VITALS
TEMPERATURE: 96.98 F | HEART RATE: 76 BPM | DIASTOLIC BLOOD PRESSURE: 94 MMHG | OXYGEN SATURATION: 94 % | SYSTOLIC BLOOD PRESSURE: 121 MMHG

## 2018-01-07 VITALS — HEART RATE: 79 BPM | OXYGEN SATURATION: 95 %

## 2018-01-07 VITALS — OXYGEN SATURATION: 96 % | HEART RATE: 104 BPM

## 2018-01-07 VITALS — OXYGEN SATURATION: 91 % | HEART RATE: 95 BPM

## 2018-01-07 VITALS
OXYGEN SATURATION: 93 % | SYSTOLIC BLOOD PRESSURE: 152 MMHG | TEMPERATURE: 98.96 F | DIASTOLIC BLOOD PRESSURE: 81 MMHG | HEART RATE: 91 BPM

## 2018-01-07 VITALS
HEART RATE: 90 BPM | SYSTOLIC BLOOD PRESSURE: 132 MMHG | OXYGEN SATURATION: 95 % | TEMPERATURE: 97.16 F | DIASTOLIC BLOOD PRESSURE: 95 MMHG

## 2018-01-07 VITALS — OXYGEN SATURATION: 95 % | HEART RATE: 91 BPM

## 2018-01-07 LAB
BUN SERPL-MCNC: 19 MG/DL (ref 7–18)
CALCIUM SERPL-MCNC: 8.1 MG/DL (ref 8.5–10.1)
CO2 SERPL-SCNC: 41 MMOL/L (ref 21–32)
CREAT SERPL-MCNC: 0.63 MG/DL (ref 0.6–1.2)
EOSINOPHIL NFR BLD AUTO: 284 K/UL (ref 130–400)
GLUCOSE SERPL-MCNC: 231 MG/DL (ref 70–99)
HCT VFR BLD CALC: 32.4 % (ref 37–47)
HGB BLD-MCNC: 10.3 G/DL (ref 12–16)
MCH RBC QN AUTO: 29.1 PG (ref 25–34)
MCHC RBC AUTO-ENTMCNC: 31.8 G/DL (ref 32–36)
MCV RBC AUTO: 91.5 FL (ref 80–100)
NRBC BLD AUTO-RTO: 0.2 %
NUCLEATED RED BLOOD CELL ABS: 0.03 K/UL (ref 0–0)
PMV BLD AUTO: 9.1 FL (ref 7.4–10.4)
POTASSIUM SERPL-SCNC: 3.9 MMOL/L (ref 3.5–5.1)
PTT PATIENT: 59.7 SECONDS (ref 21–31)
PTT PATIENT: 87.2 SECONDS (ref 21–31)
RED CELL DISTRIBUTION WIDTH CV: 13.7 % (ref 11.5–14.5)
RED CELL DISTRIBUTION WIDTH SD: 45.7 FL (ref 36.4–46.3)
SODIUM SERPL-SCNC: 134 MMOL/L (ref 136–145)
WBC # BLD AUTO: 16.66 K/UL (ref 4.8–10.8)

## 2018-01-07 RX ADMIN — LEVOFLOXACIN SCH MLS/HR: 5 INJECTION, SOLUTION INTRAVENOUS at 06:03

## 2018-01-07 RX ADMIN — LATANOPROST SCH DROPS: 50 SOLUTION/ DROPS OPHTHALMIC at 21:38

## 2018-01-07 RX ADMIN — METHYLPREDNISOLONE SODIUM SUCCINATE SCH MLS/MIN: 1 INJECTION, POWDER, FOR SOLUTION INTRAMUSCULAR; INTRAVENOUS at 07:42

## 2018-01-07 RX ADMIN — SIMVASTATIN SCH MG: 20 TABLET, FILM COATED ORAL at 21:37

## 2018-01-07 RX ADMIN — MESALAMINE SCH MG: 250 CAPSULE ORAL at 21:37

## 2018-01-07 RX ADMIN — PIPERACILLIN SODIUM, TAZOBACTAM SODIUM SCH MLS/HR: 4; .5 INJECTION, POWDER, LYOPHILIZED, FOR SOLUTION INTRAVENOUS at 16:40

## 2018-01-07 RX ADMIN — LEVALBUTEROL HYDROCHLORIDE SCH MG: 1.25 SOLUTION RESPIRATORY (INHALATION) at 14:25

## 2018-01-07 RX ADMIN — LEVALBUTEROL HYDROCHLORIDE SCH MG: 1.25 SOLUTION RESPIRATORY (INHALATION) at 01:39

## 2018-01-07 RX ADMIN — FUROSEMIDE SCH MLS/MIN: 10 INJECTION, SOLUTION INTRAMUSCULAR; INTRAVENOUS at 16:40

## 2018-01-07 RX ADMIN — ACETYLCYSTEINE SCH ML: 200 SOLUTION ORAL; RESPIRATORY (INHALATION) at 07:05

## 2018-01-07 RX ADMIN — MESALAMINE SCH MG: 250 CAPSULE ORAL at 07:42

## 2018-01-07 RX ADMIN — MIRTAZAPINE SCH MG: 15 TABLET, FILM COATED ORAL at 21:38

## 2018-01-07 RX ADMIN — PIPERACILLIN SODIUM, TAZOBACTAM SODIUM SCH MLS/HR: 4; .5 INJECTION, POWDER, LYOPHILIZED, FOR SOLUTION INTRAVENOUS at 02:04

## 2018-01-07 RX ADMIN — PIPERACILLIN SODIUM, TAZOBACTAM SODIUM SCH MLS/HR: 4; .5 INJECTION, POWDER, LYOPHILIZED, FOR SOLUTION INTRAVENOUS at 09:59

## 2018-01-07 RX ADMIN — ACETYLCYSTEINE SCH ML: 200 SOLUTION ORAL; RESPIRATORY (INHALATION) at 19:01

## 2018-01-07 RX ADMIN — HEPARIN SODIUM PRN MLS/HR: 5000 INJECTION, SOLUTION INTRAVENOUS at 02:38

## 2018-01-07 RX ADMIN — METHYLPREDNISOLONE SODIUM SUCCINATE SCH MLS/MIN: 1 INJECTION, POWDER, FOR SOLUTION INTRAMUSCULAR; INTRAVENOUS at 21:38

## 2018-01-07 RX ADMIN — CITALOPRAM HYDROBROMIDE SCH MG: 40 TABLET ORAL at 07:42

## 2018-01-07 RX ADMIN — HEPARIN SODIUM PRN MLS/HR: 5000 INJECTION, SOLUTION INTRAVENOUS at 21:42

## 2018-01-07 RX ADMIN — DILTIAZEM HYDROCHLORIDE SCH MG: 240 CAPSULE, EXTENDED RELEASE ORAL at 07:42

## 2018-01-07 RX ADMIN — PANTOPRAZOLE SCH MG: 40 TABLET, DELAYED RELEASE ORAL at 07:42

## 2018-01-07 RX ADMIN — LEVALBUTEROL HYDROCHLORIDE SCH MG: 1.25 SOLUTION RESPIRATORY (INHALATION) at 19:01

## 2018-01-07 RX ADMIN — STANDARDIZED SENNA CONCENTRATE AND DOCUSATE SODIUM SCH TAB: 8.6; 5 TABLET ORAL at 07:42

## 2018-01-07 RX ADMIN — LEVALBUTEROL HYDROCHLORIDE SCH MG: 1.25 SOLUTION RESPIRATORY (INHALATION) at 07:05

## 2018-01-07 RX ADMIN — FUROSEMIDE SCH MLS/MIN: 10 INJECTION, SOLUTION INTRAMUSCULAR; INTRAVENOUS at 09:59

## 2018-01-07 NOTE — PULMONOLOGY PROGRESS NOTE
Pulmonary Progress Note


Date of Service


Jan 7, 2018.





Attending


Dr. Fuentes





Subjective


Patient seen and examined at bedside.


She states that she is feeling a little bit better from admission.


She is feeling less short of breath today.


She is still quite tachypneic. She did not wear BIPAP last night.





Objective


VS reviewed.  MAXIMUM TEMPERATURE 37.2, //113, P , RR 12-28, 

SaO2 93-96 % on HFNC 50 L/m, 80%.  866 ml negative since admission


Gen: She is awake alert oriented 3.  Still tachypneic with mild use of 

accessory muscles of respiration


CVS: S1, S2, irregularly irregular, tachycardic


Lungs: decreased breath sound bilaterally, crackles at bases, tachypneic


Abd: soft/NT/NT/BS+


Ext: left lower extremity in splint, right lower extremity trace edema, chronic 

venous changes; no cyanosis, no clubbing





Labs reviewed. 


   ABG 01/06/2018--7.41/63/71/39/92.1% on 70% FiO2 BiPAP


   ABG 01/05/2018--7.27/80/116/36/96.7% on high flow nasal cannula


   Sodium 134, potassium 3.9, CO2 39-->41, BUN 19 and creatinine 0.63


   White blood cell count 16.66  from 13, hemoglobin 10.3, platelet count 284 

and stable


   PTT 87





Imaging reviewed. 





CTA chest 1/3/2017


   IMPRESSION:  


1. Study compromised by respiratory motion artifact


2. No evidence of acute pulmonary embolism


3. No evidence of pathologic adenopathy


4. 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


5. Dense bilateral lower lobe atelectasis/consolidation. Patchy airspace


opacities are also present within the right upper lobe lingula and right middle


lobe.


6. Small bilateral pleural effusions


7. Severe L1 compression fracture with mild retropulsion.








Medications reviewed.





Assessment & Plan


Acute on chronic hypercapnic hypoxic respiratory failure


COPD (unknown FEV1) in acute exacerbation


Hypertensive urgency


Possible diastolic dysfunction


Electrolyte imbalance


Tobacco use disorder


Morbid obesity


Atrial fibrillation


Pneumonia





Mrs. Pittman is a 82-year-old female who presents with acute on chronic 

hypercapnic respiratory failure with status post fall and sustaining left 

bimalleolar fracture.  She is on chronic long-term oxygen therapy at 2 L/m 

during the day and 4 L/m at night, for COPD. Mrs. Pittman shows improvement in 

her acid base status on 1/5/2018. 


She refused BIPAP overnight. CO2 on chemistry 41.


Continues to have increased BP and increased WBC as well.





Recommendations


Continue with BiPAP and high flow nasal cannula intermittently.  She should use 

BiPAP at night as well. Again stressed compliance.


Continue to treat for COPD exacerbation with broad-spectrum antibiotics, 

nebulizers and IV corticosteroids.  Continue with Solu-Medrol every 12 IV.  

Continue with diuresis.  Currently on heparin drip as well as antiarrhythmics 

per cardiology recommendations who are following closely


Encourage flutter valve and incentive spirometry.


Continue with chest vest to assists with aggressive pulmonary toilet as well as 

Mucomyst twice a day


Will order repeat CXR today, depending will consider adding vancomycin to cover 

for MRSA with increasing WBC.





Pulmonary will continue to follow.





Data


Medications:





Current Inpatient Medications








 Medications


  (Trade)  Dose


 Ordered  Sig/Donna


 Route  Start Time


 Stop Time Status Last Admin


Dose Admin


 


 Levalbuterol


  (Xopenex 1.25MG/


 3ML Neb)  1.25 mg  Q6R


 INH  1/1/18 22:30


 1/31/18 22:29  1/7/18 01:39


1.25 MG


 


 Levofloxacin


  (Consult)  1 ea  UD  PRN


 N/A  1/2/18 01:09


 2/1/18 01:08   


 


 


 Tramadol HCl


  (Ultram Tab)  50 mg  Q6H  PRN


 PO  1/1/18 21:30


 1/31/18 21:29  1/2/18 07:55


50 MG


 


 Clonidine HCl


  (Catapres Tab)  0.1 mg  Q6H  PRN


 PO  1/1/18 21:30


 1/31/18 21:29 Future Hold 1/3/18 16:21


0.1 MG


 


 Citalopram


 Hydrobromide


  (celeXA TAB)  40 mg  DAILY


 PO  1/2/18 09:00


 2/1/18 08:59  1/7/18 07:42


40 MG


 


 Latanoprost


  (Xalatan Oph


 Soln)  1 drops  HS


 OP  1/2/18 21:00


 2/1/18 20:59  1/6/18 21:24


1 DROPS


 


 Lisinopril


  (Zestril Tab)  20 mg  DAILY


 PO  1/2/18 09:00


 2/1/18 08:59 Future Hold 1/5/18 09:27


20 MG


 


 Mesalamine


  (Pentasa


 Controlled Rel


 Cap)  1,000 mg  BID


 PO  1/2/18 09:00


 2/1/18 08:59  1/7/18 07:42


1,000 MG


 


 Mirtazapine


  (Remeron Tab)  15 mg  HS


 PO  1/2/18 21:00


 2/1/18 20:59  1/6/18 21:24


15 MG


 


 Pantoprazole


 Sodium


  (Protonix Tab)  40 mg  DAILY


 PO  1/2/18 09:00


 2/1/18 08:59  1/7/18 07:42


40 MG


 


 Senna/Docusate


 Sodium


  (Senokot S Tab)  1 tab  DAILY


 PO  1/2/18 09:00


 2/1/18 08:59  1/7/18 07:42


1 TAB


 


 Simvastatin


  (Zocor Tab)  20 mg  HS


 PO  1/2/18 21:00


 2/1/18 20:59  1/6/18 21:24


20 MG


 


 Acetaminophen


  (Tylenol Tab)  650 mg  Q4H  PRN


 PO  1/1/18 21:45


 1/31/18 21:44  1/3/18 16:22


650 MG


 


 Ondansetron HCl


  (Zofran Inj)  4 mg  Q6H  PRN


 IV  1/1/18 21:45


 1/31/18 21:44   


 


 


 Piperacillin Sod/


 Tazobactam Sod


 4.5 gm/Dextrose  120 ml @ 


 30 mls/hr  Q8H


 IV  1/2/18 02:00


 1/9/18 01:59  1/7/18 02:04


30 MLS/HR


 


 Levofloxacin 750


 mg/Prmx  150 ml @ 


 100 mls/hr  Q24H


 IV  1/2/18 06:00


 1/9/18 05:59  1/7/18 06:03


100 MLS/HR


 


 Piperacillin Sod/


 Tazobactam Sod


  (Consult)  1 ea  UD  PRN


 N/A  1/2/18 01:15


 2/1/18 01:14   


 


 


 Ioversol


  (Optiray 320)  100 ml  UD  PRN


 IV  1/3/18 11:00


 1/7/18 10:59   


 


 


 Heparin Sodium/


 Dextrose  500 ml @ 


 25 mls/hr  Q20H PRN


 IV  1/6/18 08:30


 2/5/18 08:29  1/7/18 02:38


28 MLS/HR


 


 Methylprednisolone


 Sodium Succinate


 40 mg/Syringe  0.64 ml @ 


 1.5 mls/min  Q12H


 IV  1/6/18 20:00


 2/5/18 19:59  1/7/18 07:42


1.5 MLS/MIN


 


 Acetylcysteine


  (Mucomyst 20%


 Inh Soln)  3 ml  BIDR


 INH  1/6/18 20:00


 2/5/18 19:59  1/6/18 19:47


3 ML


 


 Diltiazem HCl


  (Cardizem Cd Cap)  240 mg  QAM


 PO  1/7/18 09:00


 2/5/18 08:59  1/7/18 07:42


240 MG








Vital Signs:











  Date Time  Temp Pulse Resp B/P (MAP) Pulse Ox O2 Delivery O2 Flow Rate FiO2


 


1/7/18 07:31 37.0 94 20 178/113 (134) 96 High Flow Oxygen  


 


1/7/18 04:02 37.2 91 28 152/81 (104) 93 High Flow Oxygen  


 


1/7/18 04:00      High Flow Oxygen  


 


1/7/18 01:40  103 12  93 Nasal Cannula 50.0 80


 


1/7/18 00:00      High Flow Oxygen  


 


1/6/18 23:30 36.6 105 18 163/95 (117) 94 High Flow Oxygen  


 


1/6/18 20:25 36.6 106 24 157/79 (105) 91 High Flow Oxygen  


 


1/6/18 20:00      High Flow Oxygen  


 


1/6/18 19:49  107 28  92 Nasal Cannula 50.0 97


 


1/6/18 16:24      BiPAP  


 


1/6/18 15:11 35.9 103 18 153/92 (112) 94 BiPAP  


 


1/6/18 14:19  98   92   70


 


1/6/18 14:18  98 26  92 BiPAP/CPAP  70


 


1/6/18 12:09      BiPAP  


 


1/6/18 11:07 37.0 115 20 138/77 (97) 95 High Flow Oxygen  








Laboratory Results:





Last 24 Hours








Test


  1/6/18


15:30 1/7/18


05:37


 


Activated Partial


Thromboplast Time 57.4 SECONDS 


  87.2 SECONDS 


 


 


Partial Thromboplastin Ratio 2.2  3.4 


 


White Blood Count  16.66 K/uL 


 


Red Blood Count  3.54 M/uL 


 


Hemoglobin  10.3 g/dL 


 


Hematocrit  32.4 % 


 


Mean Corpuscular Volume  91.5 fL 


 


Mean Corpuscular Hemoglobin  29.1 pg 


 


Mean Corpuscular Hemoglobin


Concent 


  31.8 g/dl 


 


 


RDW Standard Deviation  45.7 fL 


 


RDW Coefficient of Variation  13.7 % 


 


Platelet Count  284 K/uL 


 


Mean Platelet Volume  9.1 fL 


 


Nucleated RBC Absolute Count


(auto) 


  0.03 K/uL 


 


 


Nucleated Red Blood Cells %  0.2 % 


 


Sodium Level  134 mmol/L 


 


Potassium Level  3.9 mmol/L 


 


Chloride Level  92 mmol/L 


 


Carbon Dioxide Level  41 mmol/L 


 


Anion Gap  1.0 mmol/L 


 


Blood Urea Nitrogen  19 mg/dl 


 


Creatinine  0.63 mg/dl 


 


Est Creatinine Clear Calc


Drug Dose 


  84.9 ml/min 


 


 


Estimated GFR (


American) 


  96.8 


 


 


Estimated GFR (Non-


American 


  83.5 


 


 


BUN/Creatinine Ratio  30.8 


 


Random Glucose  231 mg/dl 


 


Calcium Level  8.1 mg/dl 


 


Magnesium Level  2.1 mg/dl

## 2018-01-07 NOTE — DIAGNOSTIC IMAGING REPORT
CHEST ONE VIEW PORTABLE



CLINICAL HISTORY: Hypoxia.    



COMPARISON STUDY:  Chest CT January 3, 2018 and chest radiograph January 5, 2018.



FINDINGS: There is no pneumothorax. The patient is rotated. Small bilateral

pleural effusions are again noted. There are persistent bibasilar opacities with

interstitial thickening. The cardiomediastinal silhouette is stable. 



IMPRESSION:  



1. Persistent small bilateral pleural effusions with associated bibasilar

opacities, right greater than left, which could reflect pneumonia or

atelectasis.



2. Suspected mild pulmonary edema.







Electronically signed by:  Selvin Simpson M.D.

1/7/2018 10:03 AM



Dictated Date/Time:  1/7/2018 10:01 AM

## 2018-01-07 NOTE — PROGRESS NOTE
Medicine Progress Note


Date & Time of Visit:


Jan 7, 2018 at 11:59.


Subjective


Pt was seen and examined


Lying in bed with mild respiratory distress


Pt did not want to wear the BIPAP all night last night


She is drowsy today


I explained to the patient that she needs to wear the bipap at night and when 

shes sleeps


Denies any chest pain, palpitation, dizziness and fever





Objective





Last 8 Hrs








  Date Time  Temp Pulse Resp B/P (MAP) Pulse Ox O2 Delivery O2 Flow Rate FiO2


 


1/7/18 11:46 36.1 76 22 121/94 (103) 94 BiPAP  


 


1/7/18 08:01      BiPAP  


 


1/7/18 07:31 37.0 94 20 178/113 (134) 96 High Flow Oxygen  


 


1/7/18 07:05  104 20  96 Nasal Cannula 50.0 80


 


1/7/18 04:02 37.2 91 28 152/81 (104) 93 High Flow Oxygen  


 


1/7/18 04:00      High Flow Oxygen  








Physical Exam:


General- No acute distress


Head-  atraumatic


Eyes- PERRL, EOMI


ENT- oropharynx clear


Neck- supple, no JVD


Lungs- decrease BS


Heart- irregular


Abdomen- normal bowel sounds, soft


Extremities- no calf tenderness, Left ankle pain, splint with ace bandage in LE


Neuro- lethargy


Skin- warm & dry


Laboratory Results:





Last 24 Hours








Test


  1/6/18


15:30 1/7/18


05:37 1/7/18


11:17


 


Activated Partial


Thromboplast Time 57.4 SECONDS 


  87.2 SECONDS 


  


 


 


Partial Thromboplastin Ratio 2.2  3.4  


 


White Blood Count  16.66 K/uL  


 


Red Blood Count  3.54 M/uL  


 


Hemoglobin  10.3 g/dL  


 


Hematocrit  32.4 %  


 


Mean Corpuscular Volume  91.5 fL  


 


Mean Corpuscular Hemoglobin  29.1 pg  


 


Mean Corpuscular Hemoglobin


Concent 


  31.8 g/dl 


  


 


 


RDW Standard Deviation  45.7 fL  


 


RDW Coefficient of Variation  13.7 %  


 


Platelet Count  284 K/uL  


 


Mean Platelet Volume  9.1 fL  


 


Nucleated RBC Absolute Count


(auto) 


  0.03 K/uL 


  


 


 


Nucleated Red Blood Cells %  0.2 %  


 


Sodium Level  134 mmol/L  


 


Potassium Level  3.9 mmol/L  


 


Chloride Level  92 mmol/L  


 


Carbon Dioxide Level  41 mmol/L  


 


Anion Gap  1.0 mmol/L  


 


Blood Urea Nitrogen  19 mg/dl  


 


Creatinine  0.63 mg/dl  


 


Est Creatinine Clear Calc


Drug Dose 


  84.9 ml/min 


  


 


 


Estimated GFR (


American) 


  96.8 


  


 


 


Estimated GFR (Non-


American 


  83.5 


  


 


 


BUN/Creatinine Ratio  30.8  


 


Random Glucose  231 mg/dl  


 


Calcium Level  8.1 mg/dl  


 


Magnesium Level  2.1 mg/dl  


 


Arterial Blood pH   7.39 


 


Arterial Blood Partial


Pressure CO2 


  


  68 mmHg 


 


 


Arterial Blood Partial


Pressure O2 


  


  69 mm/Hg 


 


 


Arterial Blood HCO3   41 mmol/L 


 


Arterial Blood Oxygen


Saturation 


  


  91.8 % 


 


 


Arterial Blood Base Excess   13.3 mEq/L 


 


Arterial Blood Gas Delivery   70% 


 


Alberto Test   POS 











Assessment & Plan


Acute hypercapnic respiratory failure 


Mostly related to COPD exacerbation vs Pneumonia


CXR showed dense bibasilar airspace consolidation, right greater than left with 

associated pleural effusions.


ABG on admission showed respiratory acidosis with comp


Continue Levaquin and Zosyn


Negative influenza test 


Continue Solumedrol


Continue BIPAP with intermittent  high flow supplement oxygen 


Continue respiratory treatment with Duoneb


Blood cx no growth


ABG done today showed respiratory acidosis with PCO2 80


Not to sure if it due to noncompliant since pt does not want to keep the Bipap 

on or if we need to change the setting. 


Will repeat ABG


monitor closely


CT with PE protocol showed no evidence for PE. Dense bilateral lower lobe 

atelectasis/consolidation. Patchy airspace


opacities are also present within the right upper lobe lingula and right middle 

lobe.


1/6


Clinically improved slightly


continue using BIPAP at night 


Received lasix last night and diuresis well


Continue abx


Continue respiratory treatment


continue Solumedrol


1/7


Drowsy this morning


Refused to wear BIPap last night


Encourage pt to keep the bipap one if she needs to get better


Continue BIPAP 


lasix 40mg BID


ABG showed PCO2 68


CXR today showed persistent small bilateral pleural effusions with associated 

bibasilar opacities, right greater than left. Suspected mild pulmonary edema.


Consider to add vanco IV


Continue Levaquin and zosyn


ECHO


* The echocardiogram is extremely technically limited.


* Patient was sitting upright for the test.


* There is no significant pericardial effusion noted.


* The left ventricular systolic function is grossly normal on limited 

visualization.


* The right ventricular systolic function is grossly normal.


* The valves are poorly visualized.


* There is no significant valvular stenosis or regurgitation on technically 

limited Doppler evaluation.





Paroxysmal atrial flutter with rapid ventricular response


Due to acute hypercapnic respiratory failure


received Cardizem and digoxin


Continue on heparin drip


Continue cardizem 240mg





Hyponatremia


Possible related to hypovolemia  


Na on admission 128


Na today 134


Improved





Hyperkalemia


K 3.9


Pt is on lisinopril (might not be a good candidate for ACEI due to hyperkalemia)


Will put on a low K diet


If stay high, will give Kayexalate


Continue monitor BMP


stable





Left Ankle fracture s/p fall 


Left ankle xray showed distal tibial and fibular fractures as above with mild 

lateral subluxation of the talus.


Ortho on board


pain control


Continue required high flow oxygen and BIPAP for acute hypercapnic respiratory 

failure 


Continue to postpone surgery until respiratory status improves





Right pelvic ring fracture, 


Pelvis xray showed an age indeterminant right pubic ring fracture, possibly 

chronic.


Stable








Hypertension


BP elevated 


Continue lisinopril.  


will add amlodipine


PRN clonidine


Continue monitor BP








Ulcerative colitis


On Pentasa


Stable





Depression


On Celexa


Stable





DVT px 


On heparin drip





CODE STATUS DNR 





DISPOSITION


Continue monitor in tele


Consultants:


Pulmonary


Current Inpatient Medications:





Current Inpatient Medications








 Medications


  (Trade)  Dose


 Ordered  Sig/Donna


 Route  Start Time


 Stop Time Status Last Admin


Dose Admin


 


 Levalbuterol


  (Xopenex 1.25MG/


 3ML Neb)  1.25 mg  Q6R


 INH  1/1/18 22:30


 1/31/18 22:29  1/7/18 07:05


1.25 MG


 


 Levofloxacin


  (Consult)  1 ea  UD  PRN


 N/A  1/2/18 01:09


 2/1/18 01:08   


 


 


 Tramadol HCl


  (Ultram Tab)  50 mg  Q6H  PRN


 PO  1/1/18 21:30


 1/31/18 21:29  1/2/18 07:55


50 MG


 


 Clonidine HCl


  (Catapres Tab)  0.1 mg  Q6H  PRN


 PO  1/1/18 21:30


 1/31/18 21:29 Future Hold 1/3/18 16:21


0.1 MG


 


 Citalopram


 Hydrobromide


  (celeXA TAB)  40 mg  DAILY


 PO  1/2/18 09:00


 2/1/18 08:59  1/7/18 07:42


40 MG


 


 Latanoprost


  (Xalatan Oph


 Soln)  1 drops  HS


 OP  1/2/18 21:00


 2/1/18 20:59  1/6/18 21:24


1 DROPS


 


 Lisinopril


  (Zestril Tab)  20 mg  DAILY


 PO  1/2/18 09:00


 2/1/18 08:59 Future Hold 1/5/18 09:27


20 MG


 


 Mesalamine


  (Pentasa


 Controlled Rel


 Cap)  1,000 mg  BID


 PO  1/2/18 09:00


 2/1/18 08:59  1/7/18 07:42


1,000 MG


 


 Mirtazapine


  (Remeron Tab)  15 mg  HS


 PO  1/2/18 21:00


 2/1/18 20:59  1/6/18 21:24


15 MG


 


 Pantoprazole


 Sodium


  (Protonix Tab)  40 mg  DAILY


 PO  1/2/18 09:00


 2/1/18 08:59  1/7/18 07:42


40 MG


 


 Senna/Docusate


 Sodium


  (Senokot S Tab)  1 tab  DAILY


 PO  1/2/18 09:00


 2/1/18 08:59  1/7/18 07:42


1 TAB


 


 Simvastatin


  (Zocor Tab)  20 mg  HS


 PO  1/2/18 21:00


 2/1/18 20:59  1/6/18 21:24


20 MG


 


 Acetaminophen


  (Tylenol Tab)  650 mg  Q4H  PRN


 PO  1/1/18 21:45


 1/31/18 21:44  1/3/18 16:22


650 MG


 


 Ondansetron HCl


  (Zofran Inj)  4 mg  Q6H  PRN


 IV  1/1/18 21:45


 1/31/18 21:44   


 


 


 Piperacillin Sod/


 Tazobactam Sod


 4.5 gm/Dextrose  120 ml @ 


 30 mls/hr  Q8H


 IV  1/2/18 02:00


 1/9/18 01:59  1/7/18 09:59


30 MLS/HR


 


 Levofloxacin 750


 mg/Prmx  150 ml @ 


 100 mls/hr  Q24H


 IV  1/2/18 06:00


 1/9/18 05:59  1/7/18 06:03


100 MLS/HR


 


 Piperacillin Sod/


 Tazobactam Sod


  (Consult)  1 ea  UD  PRN


 N/A  1/2/18 01:15


 2/1/18 01:14   


 


 


 Heparin Sodium/


 Dextrose  500 ml @ 


 25 mls/hr  Q20H PRN


 IV  1/6/18 08:30


 2/5/18 08:29  1/7/18 02:38


28 MLS/HR


 


 Methylprednisolone


 Sodium Succinate


 40 mg/Syringe  0.64 ml @ 


 1.5 mls/min  Q12H


 IV  1/6/18 20:00


 2/5/18 19:59  1/7/18 07:42


1.5 MLS/MIN


 


 Acetylcysteine


  (Mucomyst 20%


 Inh Soln)  3 ml  BIDR


 INH  1/6/18 20:00


 2/5/18 19:59  1/7/18 07:05


3 ML


 


 Diltiazem HCl


  (Cardizem Cd Cap)  240 mg  QAM


 PO  1/7/18 09:00


 2/5/18 08:59  1/7/18 07:42


240 MG


 


 Furosemide 40 mg/


 Syringe  4 ml @ 4


 mls/min  BID17


 IV  1/7/18 09:00


 2/6/18 08:59  1/7/18 09:59


4 MLS/MIN

## 2018-01-07 NOTE — CARDIOLOGY FOLLOW-UP
Subjective


General


Date of Service:


Jan 7, 2018.


Pt evaluation today including:  conversation w/ patient, physical exam, chart 

review, lab review, review of studies, conversation w/ consultant, review of 

inpatient medication list





History of Present Illness


The patient is a 82 year old female seen in follow-up.


More alert today.


Tolerated her a.m. meal.


Heart rate improved, however, more hypertensive.


Patient has not received her maintenance oral Lasix since admission.


Fluid balance is positive.


She offers no complaints this time.


Telemetry demonstrates atrial flutter with an average heart rate of 95 bpm.





Allergies


Coded Allergies:  


     Sulfa Drugs (Verified  Allergy, Unknown, `, 5/18/14)


     Sulfamethoxazole (Verified  Allergy, Unknown, `, 5/18/14)


     Trimethoprim (Verified  Allergy, Unknown, `, 5/18/14)





Social History


Smoking Status:  Current Some Day Smoker


Hx Tobacco Use In Past Year?:  Yes


Hx Alcohol Use - Type And Amou:  No


Hx Substance Use - Type And Am:  No





Problem List


Medical Problems:


(1) Acute on chronic respiratory failure with hypoxia and hypercapnia


Status: Acute  





(2) Ankle fracture, left


Status: Acute  





(3) Bimalleolar ankle fracture


Status: Acute  





(4) Hyponatremia


Status: Acute  





(5) Pneumonia


Status: Acute  











Review of Systems


Respiratory:  + cough, + sputum, + shortness of breath, + dyspnea on exertion, 

+ dyspnea at rest, No wheezing, No hemoptysis


Cardiac:  No chest pain, No orthopnea, No PND, No edema, No claudication, No 

palpitations





Physical Exam


Vital Signs





Last Vital Signs Documentation








  Date Time  Temp Pulse Resp B/P (MAP) Pulse Ox O2 Delivery O2 Flow Rate FiO2


 


1/7/18 07:31 37.0 94 20 178/113 (134) 96 High Flow Oxygen  


 


1/7/18 01:40       50.0 80











Physical Exam


Constitutional:  


   Level of Distress:  NAD, chronically ill


Head:  normocephalic


Lungs:  


   Auscultation:  decreased breath sounds, rales/crackles on the left, rales/

crackles on the right


Cardiovascular:  


   Heart Auscultation:  normal S1, normal S2, no murmurs, irregular rate rhythm


Peripheral Pulses:  


   Radial Pulse:  normal on the left, normal on the right


Abdomen:  


   Inspection & Palpation:  soft, non-distended, no tenderness, guarding & 

rebound, no masses


Extremities:  no cyanosis, no edema, no clubbing, no ulcers


Neurologic:  


   Gait & Station:  pertinent finding (no focal motor deficit)


   Cranial Nerves:  grossly intact





Assessment and Plan


Assessment and Plan


FINAL IMPRESSION:


1.  Paroxysmal atrial flutter with rapid ventricular response secondary


to underlying respiratory insufficiency, hypercapnic respiratory failure,


chronic obstructive pulmonary disease exacerbation, and pneumonia.


2.  Acute decompensated diastolic heart failure exacerbated by rapid atrial 

flutter as well as positive fluid balance since admission.


3.  Hypertension -- uncontrolled.


4.  Preserved LV and RV function.


5.  Hyponatremia.


6.  Left ankle fracture, status post full.


7.  Ulcerative colitis.


8.  Carotid vascular disease with history of prior endarterectomy


 


PLAN AND RECOMMENDATIONS: 


Lasix 40 mg IV 1 now then 40 mg IV twice a day.


Cardizem CD increased to 240mg daily.


Continue intravenous heparin.


Follow fluid balance, GFR, electrolytes, respiratory status closely.


Repeat chest x-ray today.


Repeat ECG in a.m.





Laboratory Results





Last 24 Hours








Test


  1/6/18


15:30 1/7/18


05:37


 


Activated Partial


Thromboplast Time 57.4 SECONDS 


  87.2 SECONDS 


 


 


Partial Thromboplastin Ratio 2.2  3.4 


 


White Blood Count  16.66 K/uL 


 


Red Blood Count  3.54 M/uL 


 


Hemoglobin  10.3 g/dL 


 


Hematocrit  32.4 % 


 


Mean Corpuscular Volume  91.5 fL 


 


Mean Corpuscular Hemoglobin  29.1 pg 


 


Mean Corpuscular Hemoglobin


Concent 


  31.8 g/dl 


 


 


RDW Standard Deviation  45.7 fL 


 


RDW Coefficient of Variation  13.7 % 


 


Platelet Count  284 K/uL 


 


Mean Platelet Volume  9.1 fL 


 


Nucleated RBC Absolute Count


(auto) 


  0.03 K/uL 


 


 


Nucleated Red Blood Cells %  0.2 % 


 


Sodium Level  134 mmol/L 


 


Potassium Level  3.9 mmol/L 


 


Chloride Level  92 mmol/L 


 


Carbon Dioxide Level  41 mmol/L 


 


Anion Gap  1.0 mmol/L 


 


Blood Urea Nitrogen  19 mg/dl 


 


Creatinine  0.63 mg/dl 


 


Est Creatinine Clear Calc


Drug Dose 


  84.9 ml/min 


 


 


Estimated GFR (


American) 


  96.8 


 


 


Estimated GFR (Non-


American 


  83.5 


 


 


BUN/Creatinine Ratio  30.8 


 


Random Glucose  231 mg/dl 


 


Calcium Level  8.1 mg/dl 


 


Magnesium Level  2.1 mg/dl

## 2018-01-08 VITALS
DIASTOLIC BLOOD PRESSURE: 87 MMHG | TEMPERATURE: 97.88 F | OXYGEN SATURATION: 96 % | SYSTOLIC BLOOD PRESSURE: 133 MMHG | HEART RATE: 97 BPM

## 2018-01-08 VITALS
DIASTOLIC BLOOD PRESSURE: 83 MMHG | OXYGEN SATURATION: 93 % | HEART RATE: 96 BPM | TEMPERATURE: 98.96 F | SYSTOLIC BLOOD PRESSURE: 156 MMHG

## 2018-01-08 VITALS — HEART RATE: 93 BPM | OXYGEN SATURATION: 91 %

## 2018-01-08 VITALS — OXYGEN SATURATION: 95 % | HEART RATE: 97 BPM

## 2018-01-08 VITALS
DIASTOLIC BLOOD PRESSURE: 62 MMHG | OXYGEN SATURATION: 94 % | SYSTOLIC BLOOD PRESSURE: 106 MMHG | HEART RATE: 93 BPM | TEMPERATURE: 98.42 F

## 2018-01-08 VITALS — HEART RATE: 87 BPM | OXYGEN SATURATION: 95 %

## 2018-01-08 VITALS
HEART RATE: 90 BPM | TEMPERATURE: 99.14 F | SYSTOLIC BLOOD PRESSURE: 151 MMHG | OXYGEN SATURATION: 94 % | DIASTOLIC BLOOD PRESSURE: 84 MMHG

## 2018-01-08 VITALS
OXYGEN SATURATION: 95 % | TEMPERATURE: 98.6 F | SYSTOLIC BLOOD PRESSURE: 161 MMHG | HEART RATE: 100 BPM | DIASTOLIC BLOOD PRESSURE: 75 MMHG

## 2018-01-08 VITALS
TEMPERATURE: 98.06 F | OXYGEN SATURATION: 94 % | DIASTOLIC BLOOD PRESSURE: 83 MMHG | SYSTOLIC BLOOD PRESSURE: 150 MMHG | HEART RATE: 94 BPM

## 2018-01-08 VITALS — HEART RATE: 88 BPM | OXYGEN SATURATION: 96 %

## 2018-01-08 VITALS — HEART RATE: 93 BPM | OXYGEN SATURATION: 93 %

## 2018-01-08 VITALS — OXYGEN SATURATION: 92 % | HEART RATE: 99 BPM

## 2018-01-08 LAB
BUN SERPL-MCNC: 25 MG/DL (ref 7–18)
BUN SERPL-MCNC: 25 MG/DL (ref 7–18)
CALCIUM SERPL-MCNC: 8 MG/DL (ref 8.5–10.1)
CALCIUM SERPL-MCNC: 8.1 MG/DL (ref 8.5–10.1)
CO2 SERPL-SCNC: 44 MMOL/L (ref 21–32)
CO2 SERPL-SCNC: 44 MMOL/L (ref 21–32)
CREAT SERPL-MCNC: 0.7 MG/DL (ref 0.6–1.2)
CREAT SERPL-MCNC: 0.99 MG/DL (ref 0.6–1.2)
EOSINOPHIL NFR BLD AUTO: 314 K/UL (ref 130–400)
GLUCOSE SERPL-MCNC: 230 MG/DL (ref 70–99)
GLUCOSE SERPL-MCNC: 313 MG/DL (ref 70–99)
HCT VFR BLD CALC: 34.4 % (ref 37–47)
HGB BLD-MCNC: 11.1 G/DL (ref 12–16)
INR PPP: 1.1 (ref 0.9–1.1)
MCH RBC QN AUTO: 29.4 PG (ref 25–34)
MCHC RBC AUTO-ENTMCNC: 32.3 G/DL (ref 32–36)
MCV RBC AUTO: 91 FL (ref 80–100)
PMV BLD AUTO: 9.5 FL (ref 7.4–10.4)
POTASSIUM SERPL-SCNC: 2.8 MMOL/L (ref 3.5–5.1)
POTASSIUM SERPL-SCNC: 3 MMOL/L (ref 3.5–5.1)
PTT PATIENT: 66 SECONDS (ref 21–31)
RED CELL DISTRIBUTION WIDTH CV: 13.7 % (ref 11.5–14.5)
RED CELL DISTRIBUTION WIDTH SD: 45.1 FL (ref 36.4–46.3)
SODIUM SERPL-SCNC: 132 MMOL/L (ref 136–145)
SODIUM SERPL-SCNC: 134 MMOL/L (ref 136–145)
WBC # BLD AUTO: 17.77 K/UL (ref 4.8–10.8)

## 2018-01-08 RX ADMIN — NYSTATIN SCH ML: 100000 SUSPENSION ORAL at 12:53

## 2018-01-08 RX ADMIN — POTASSIUM CHLORIDE SCH MLS/HR: 10 INJECTION, SOLUTION INTRAVENOUS at 10:29

## 2018-01-08 RX ADMIN — LEVALBUTEROL HYDROCHLORIDE SCH MG: 1.25 SOLUTION RESPIRATORY (INHALATION) at 14:16

## 2018-01-08 RX ADMIN — PIPERACILLIN SODIUM, TAZOBACTAM SODIUM SCH MLS/HR: 4; .5 INJECTION, POWDER, LYOPHILIZED, FOR SOLUTION INTRAVENOUS at 17:01

## 2018-01-08 RX ADMIN — POTASSIUM CHLORIDE SCH MLS/HR: 10 INJECTION, SOLUTION INTRAVENOUS at 08:54

## 2018-01-08 RX ADMIN — ACETYLCYSTEINE SCH ML: 200 SOLUTION ORAL; RESPIRATORY (INHALATION) at 06:59

## 2018-01-08 RX ADMIN — LEVALBUTEROL HYDROCHLORIDE SCH MG: 1.25 SOLUTION RESPIRATORY (INHALATION) at 06:58

## 2018-01-08 RX ADMIN — MESALAMINE SCH MG: 250 CAPSULE ORAL at 08:16

## 2018-01-08 RX ADMIN — CITALOPRAM HYDROBROMIDE SCH MG: 40 TABLET ORAL at 08:17

## 2018-01-08 RX ADMIN — METHYLPREDNISOLONE SODIUM SUCCINATE SCH MLS/MIN: 1 INJECTION, POWDER, FOR SOLUTION INTRAMUSCULAR; INTRAVENOUS at 19:42

## 2018-01-08 RX ADMIN — PANTOPRAZOLE SCH MG: 40 TABLET, DELAYED RELEASE ORAL at 08:17

## 2018-01-08 RX ADMIN — HEPARIN SODIUM PRN MLS/HR: 5000 INJECTION, SOLUTION INTRAVENOUS at 20:12

## 2018-01-08 RX ADMIN — FUROSEMIDE SCH MLS/MIN: 10 INJECTION, SOLUTION INTRAMUSCULAR; INTRAVENOUS at 08:15

## 2018-01-08 RX ADMIN — LEVOFLOXACIN SCH MLS/HR: 5 INJECTION, SOLUTION INTRAVENOUS at 05:35

## 2018-01-08 RX ADMIN — METHYLPREDNISOLONE SODIUM SUCCINATE SCH MLS/MIN: 1 INJECTION, POWDER, FOR SOLUTION INTRAMUSCULAR; INTRAVENOUS at 08:16

## 2018-01-08 RX ADMIN — LEVALBUTEROL HYDROCHLORIDE SCH MG: 1.25 SOLUTION RESPIRATORY (INHALATION) at 17:55

## 2018-01-08 RX ADMIN — NYSTATIN SCH ML: 100000 SUSPENSION ORAL at 19:43

## 2018-01-08 RX ADMIN — MESALAMINE SCH MG: 250 CAPSULE ORAL at 19:44

## 2018-01-08 RX ADMIN — NYSTATIN SCH ML: 100000 SUSPENSION ORAL at 16:57

## 2018-01-08 RX ADMIN — PIPERACILLIN SODIUM, TAZOBACTAM SODIUM SCH MLS/HR: 4; .5 INJECTION, POWDER, LYOPHILIZED, FOR SOLUTION INTRAVENOUS at 10:30

## 2018-01-08 RX ADMIN — STANDARDIZED SENNA CONCENTRATE AND DOCUSATE SODIUM SCH TAB: 8.6; 5 TABLET ORAL at 08:17

## 2018-01-08 RX ADMIN — ACETYLCYSTEINE SCH ML: 200 SOLUTION ORAL; RESPIRATORY (INHALATION) at 17:55

## 2018-01-08 RX ADMIN — DILTIAZEM HYDROCHLORIDE SCH MG: 240 CAPSULE, EXTENDED RELEASE ORAL at 08:17

## 2018-01-08 RX ADMIN — MIRTAZAPINE SCH MG: 15 TABLET, FILM COATED ORAL at 19:43

## 2018-01-08 RX ADMIN — LEVALBUTEROL HYDROCHLORIDE SCH MG: 1.25 SOLUTION RESPIRATORY (INHALATION) at 01:45

## 2018-01-08 RX ADMIN — PIPERACILLIN SODIUM, TAZOBACTAM SODIUM SCH MLS/HR: 4; .5 INJECTION, POWDER, LYOPHILIZED, FOR SOLUTION INTRAVENOUS at 02:22

## 2018-01-08 RX ADMIN — SIMVASTATIN SCH MG: 20 TABLET, FILM COATED ORAL at 19:43

## 2018-01-08 RX ADMIN — LATANOPROST SCH DROPS: 50 SOLUTION/ DROPS OPHTHALMIC at 19:43

## 2018-01-08 NOTE — DIAGNOSTIC IMAGING REPORT
CHEST ONE VIEW PORTABLE



CLINICAL HISTORY: Hypoxia. Abnormal chest x-ray.    



COMPARISON STUDY:  1/7/2018



FINDINGS: The heart remains enlarged. There are persistent bilateral pleural

effusions. There is right lower lobe pulmonary consolidation with air

bronchograms. Left basal airspace opacities are likely atelectatic. There is

improving mild pulmonary vascular congestion.[ 



IMPRESSION: 

1. Cardiomegaly and improving mild pulmonary vascular congestion

2. Bilateral pleural effusions with persistent bibasal airspace opacities. The

right lower lobe air bronchograms could indicate pneumonia, although atelectasis

could appear similar. Clinical correlation will be necessary in this regard







Electronically signed by:  Clarence Richmond M.D.

1/8/2018 7:01 AM



Dictated Date/Time:  1/8/2018 6:59 AM

## 2018-01-08 NOTE — PULMONOLOGY PROGRESS NOTE
Pulmonary Progress Note


Date of Service


Jan 8, 2018.





Attending


Dr. Benitez Maloney


This is an 83 yo female that sustained a malleolar fracture and was brought to 

[facility] for evaluation for ORIF. On examination she was hypoxic and surgery was 

delayed pending improvement of her respiratory status. ABG this morning was 7.44

/70/79/46. Patient used BiPAP most of the night. CTA on 1/3/18 was negative for 

PE but did reveal Dense bilateral lower lobe atelectasis/consolidation. Patchy 

airspace opacities are also present within the right upper lobe lingula and 

right middle lobe. CXR impression today is Bilateral pleural effusions with 

persistent bibasal airspace opacities. The right lower lobe air bronchograms 

could indicate pneumonia, although atelectasis could appear similar. Patient is 

afebrile but has a rising white count, most likely due to steroids (currently 

methylprednisolone 40mg IV Q12H). She is on Zosyn 4.5 gm q8h (Day #6) and 

Levofloxacin 750 mg IV daily (Day #6). No sputum cultures were collected. For 

pulmonary toileting, patient has a flutter valve but is unclear how to use it. 

She was instructed on proper use and responded with a wet cough with no sputum 

produced. She is also on Acetylcysteine Nebs BID (today is day #3). Patient is 

in atrial fibrillation and is on a heparin gtt.





Patient is seen at bedside and states that she is pretty much at baseline. Seh 

has no production of sputum. She tolerated BiPAP last night. She is afebrile, 

denies shakes or chills. She reports no need for a nicotine patch. Pain is well 

controlled. She has no acute complaints.





On examination, she appears to have thrush and when questioned, states that she 

does have a little pain and trouble swallowing. 





She has no evidence of aspiration as she eats her breakfast.





Objective





Gen: She is awake and oriented. 


CVS: Irregularly irregular, rate in the low 100s


ENT: Hi Aditya O2 in place. Oral candidiasis evident on tongue and posterior oral 

pharynx


Lungs: decreased breath sound bilaterally, crackles at bases


Abd: Soft, NT, ND, BS present


Ext: left lower extremity in splint, right lower extremity trace edema, chronic 

venous stasis. Right hand discolored, coban removed from recent ABG with no 

bleeding or hematoma





Labs: 


   ABG 1/8/2018 -  7.44/70/79/46/94.6% on 70% FiO2 BiPAP


   ABG 01/06/2018--7.41/63/71/39/92.1% on 70% FiO2 BiPAP


   ABG 01/05/2018--7.27/80/116/36/96.7% on high flow nasal cannula


   


Imaging reviewed. 





CTA chest 1/3/2017


   IMPRESSION:  


1. Study compromised by respiratory motion artifact


2. No evidence of acute pulmonary embolism


3. No evidence of pathologic adenopathy


4. 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


5. Dense bilateral lower lobe atelectasis/consolidation. Patchy airspace


opacities are also present within the right upper lobe lingula and right middle


lobe.


6. Small bilateral pleural effusions


7. Severe L1 compression fracture with mild retropulsion.








Medications reviewed.





Assessment & Plan


Acute on chronic hypercapnic hypoxic respiratory failure


COPD (unknown FEV1) in acute exacerbation


Hypertensive urgency


Possible diastolic dysfunction


Electrolyte imbalance


Tobacco use disorder


Morbid obesity


Atrial fibrillation


Pneumonia





Mrs. Pittman is a 82-year-old female who presents with acute on chronic 

hypercapnic respiratory failure with status post fall and sustaining left 

bimalleolar fracture.  She is on chronic long-term oxygen therapy at 2 L/m 

during the day and 4 L/m at night, for COPD. Mrs. Pittman continues to smoke 1/2 

PPD by going into her bathroom without supplemental O2 and using the bathroom 

fan. 


She tolerated BIPAP overnight and committed to continuing use while inpatient. 


BP is stable 





HYPERCAPNIC/HYPOXIC  ACUTE ON CHRONIC RESPIRATORY FAILURE


Continues with pulmonary congestion, pleural effusions, atelectasis as compared 

to 12/22/16 CXR although this may be her new baseline


ABG pH 7.44 with a pCO2 70 with full awareness suggests that this is baseline 

for her due to chronic COPD and continued tobacco abuse


Continue HiFlo O2 during the day and BiPAP at night


On dual antibiotics with rising WBC - suspect elevated WBC due to steroids - 

check procalcitonin level


Patient is negative for influenza A&B per Ag study - due to age and 

comorbidities, question if PCR should be checked


Continue methylprednisolone 40 mg IV q12h


Continue Levalbuterol Nebs


Follow oxygenation


Ambulate as tolerated


OOB to chair


Continue aggressive pulmonary toileting





BILATERAL PLEURAL EFFUSIONS


Component of atelectasis masking validity of pleural effusions


Will do bedside ultra sound to check effusions


Continue incentive spirometry and flutter valve therapy





ATRIAL FIBRILLATION


Continues on heparin gtt


Unaware of tachyarrhythmia


Rate continue in the low 100s


Cardiology consulted. They will continue to manage





HYPERTENSION


Cardiology following


BP better today





TOBACCO ABUSE


Discussed need for cessation


Caution with use os O2 in the house with concurrent smoking in the house





DVT PROPHYLAXIS


Heparin gtt





Thank you for including us in [facility] care of this patient. Please refer to Dr. Marquis's addendum for further recommendations














Recommendations


Continue with BiPAP and high flow nasal cannula intermittently.  She should use 

BiPAP at night as well. Again stressed compliance.


Continue to treat for COPD exacerbation with broad-spectrum antibiotics, 

nebulizers and IV corticosteroids.  Continue with Solu-Medrol every 12 IV.  

Continue with diuresis.  Currently on heparin drip as well as antiarrhythmics 

per cardiology recommendations who are following closely


Encourage flutter valve and incentive spirometry.


Continue with chest vest to assists with aggressive pulmonary toilet as well as 

Mucomyst twice a day


Will order repeat CXR today, depending will consider adding vancomycin to cover 

for MRSA with increasing WBC.





Pulmonary will continue to follow.





Physician Supervision Note:





I was present with Arvin Baez PA-C during the history and exam. I discussed the 

case with him and agree with the findings and plan as documented in the note.  

Any exceptions or clarifications are listed here: 





Patient with acute on chronic respiratory failure, advanced COPD on home O2, non

-compliant with therapy and still smoking, now awaiting malleolar fracture 

repair. Has worsening respiratory failure now, on BIPAP alternating with high-

flow.


Continue treatment for COPD exacerbation, nocturnal BIPAP.


Still not optimized for surgery, probably never will be, however, she should be 

in better clinical condition. Recommend, if possible, spinal or local anesthesia

, general anesthesia to be avoided.


She has poor functional status to begin with, so rushing to repair the fracture 

would result in a much earlier mobilization 





Documented By:  Karl Marquis MD





Data


Medications:





Current Inpatient Medications








 Medications


  (Trade)  Dose


 Ordered  Sig/Donna


 Route  Start Time


 Stop Time Status Last Admin


Dose Admin


 


 Levalbuterol


  (Xopenex 1.25MG/


 3ML Neb)  1.25 mg  Q6R


 INH  1/1/18 22:30


 1/31/18 22:29  1/8/18 06:58


1.25 MG


 


 Levofloxacin


  (Consult)  1 ea  UD  PRN


 N/A  1/2/18 01:09


 2/1/18 01:08   


 


 


 Tramadol HCl


  (Ultram Tab)  50 mg  Q6H  PRN


 PO  1/1/18 21:30


 1/31/18 21:29  1/2/18 07:55


50 MG


 


 Clonidine HCl


  (Catapres Tab)  0.1 mg  Q6H  PRN


 PO  1/1/18 21:30


 1/31/18 21:29 Future Hold 1/3/18 16:21


0.1 MG


 


 Citalopram


 Hydrobromide


  (celeXA TAB)  40 mg  DAILY


 PO  1/2/18 09:00


 2/1/18 08:59  1/8/18 08:17


40 MG


 


 Latanoprost


  (Xalatan Oph


 Soln)  1 drops  HS


 OP  1/2/18 21:00


 2/1/18 20:59  1/7/18 21:38


1 DROPS


 


 Lisinopril


  (Zestril Tab)  20 mg  DAILY


 PO  1/2/18 09:00


 2/1/18 08:59 Future Hold 1/5/18 09:27


20 MG


 


 Mesalamine


  (Pentasa


 Controlled Rel


 Cap)  1,000 mg  BID


 PO  1/2/18 09:00


 2/1/18 08:59  1/8/18 08:16


1,000 MG


 


 Mirtazapine


  (Remeron Tab)  15 mg  HS


 PO  1/2/18 21:00


 2/1/18 20:59  1/7/18 21:38


15 MG


 


 Pantoprazole


 Sodium


  (Protonix Tab)  40 mg  DAILY


 PO  1/2/18 09:00


 2/1/18 08:59  1/8/18 08:17


40 MG


 


 Senna/Docusate


 Sodium


  (Senokot S Tab)  1 tab  DAILY


 PO  1/2/18 09:00


 2/1/18 08:59  1/8/18 08:17


1 TAB


 


 Simvastatin


  (Zocor Tab)  20 mg  HS


 PO  1/2/18 21:00


 2/1/18 20:59  1/7/18 21:37


20 MG


 


 Acetaminophen


  (Tylenol Tab)  650 mg  Q4H  PRN


 PO  1/1/18 21:45


 1/31/18 21:44  1/3/18 16:22


650 MG


 


 Ondansetron HCl


  (Zofran Inj)  4 mg  Q6H  PRN


 IV  1/1/18 21:45


 1/31/18 21:44   


 


 


 Piperacillin Sod/


 Tazobactam Sod


 4.5 gm/Dextrose  120 ml @ 


 30 mls/hr  Q8H


 IV  1/2/18 02:00


 1/9/18 01:59  1/8/18 02:22


30 MLS/HR


 


 Levofloxacin 750


 mg/Prmx  150 ml @ 


 100 mls/hr  Q24H


 IV  1/2/18 06:00


 1/9/18 05:59  1/8/18 05:35


100 MLS/HR


 


 Piperacillin Sod/


 Tazobactam Sod


  (Consult)  1 ea  UD  PRN


 N/A  1/2/18 01:15


 2/1/18 01:14   


 


 


 Heparin Sodium/


 Dextrose  500 ml @ 


 25 mls/hr  Q20H PRN


 IV  1/6/18 08:30


 2/5/18 08:29  1/7/18 21:42


25 MLS/HR


 


 Methylprednisolone


 Sodium Succinate


 40 mg/Syringe  0.64 ml @ 


 1.5 mls/min  Q12H


 IV  1/6/18 20:00


 2/5/18 19:59  1/8/18 08:16


1.5 MLS/MIN


 


 Acetylcysteine


  (Mucomyst 20%


 Inh Soln)  3 ml  BIDR


 INH  1/6/18 20:00


 2/5/18 19:59  1/8/18 06:59


3 ML


 


 Diltiazem HCl


  (Cardizem Cd Cap)  240 mg  QAM


 PO  1/7/18 09:00


 2/5/18 08:59  1/8/18 08:17


240 MG


 


 Furosemide 40 mg/


 Syringe  4 ml @ 4


 mls/min  BID17


 IV  1/7/18 09:00


 2/6/18 08:59  1/8/18 08:15


4 MLS/MIN


 


 Potassium Chloride


  (Klor-Con Tab)  20 meq  NOW  ONCE


 PO  1/8/18 08:45


 1/8/18 08:46   


 


 


 Potassium


 Chloride 10 meq/


 Prmx  100 ml @ 


 100 mls/hr  Q1H


 IV  1/8/18 08:30


 1/8/18 10:29   


 








Vital Signs:











  Date Time  Temp Pulse Resp B/P (MAP) Pulse Ox O2 Delivery O2 Flow Rate FiO2


 


1/8/18 07:13 36.9 93 20 106/62 (77) 94 BiPAP  


 


1/8/18 06:59  97 18  95 BiPAP/CPAP  70


 


1/8/18 05:22  88   96   70


 


1/8/18 04:00      BiPAP  


 


1/8/18 03:33 36.6 97 17 133/87 (102) 96 CPAP  


 


1/8/18 01:47  87   95   70


 


1/8/18 01:47  87 18  95 BiPAP/CPAP  70


 


1/8/18 00:00      BiPAP  


 


1/7/18 23:33 36.7 91 18 152/83 (106) 95 CPAP  


 


1/7/18 22:17  95   91   70


 


1/7/18 20:00      BiPAP  


 


1/7/18 19:33 36.2 90 22 132/95 (107) 95 High Flow Oxygen  


 


1/7/18 19:06  91 18  95 Nasal Cannula 50.0 75


 


1/7/18 16:14      BiPAP  


 


1/7/18 15:17 36.2 88 22 144/72 (96) 96 BiPAP  


 


1/7/18 14:27  79   95   70


 


1/7/18 14:25  79 21  95 BiPAP/CPAP  70


 


1/7/18 12:06      BiPAP  


 


1/7/18 11:46 36.1 76 22 121/94 (103) 94 BiPAP  








Laboratory Results:





Last 24 Hours








Test


  1/7/18


11:17 1/7/18


12:48 1/8/18


06:48


 


Arterial Blood pH 7.39   7.44 


 


Arterial Blood Partial


Pressure CO2 68 mmHg 


  


  70 mmHg 


 


 


Arterial Blood Partial


Pressure O2 69 mm/Hg 


  


  79 mm/Hg 


 


 


Arterial Blood HCO3 41 mmol/L   46 mmol/L 


 


Arterial Blood Oxygen


Saturation 91.8 % 


  


  94.6 % 


 


 


Arterial Blood Base Excess 13.3 mEq/L   18.7 mEq/L 


 


Arterial Blood Gas Delivery 70%   70% 


 


Alberto Test POS   POS 


 


Activated Partial


Thromboplast Time 


  59.7 SECONDS 


  66.0 SECONDS 


 


 


Partial Thromboplastin Ratio  2.3  2.5 


 


White Blood Count   17.77 K/uL 


 


Red Blood Count   3.78 M/uL 


 


Hemoglobin   11.1 g/dL 


 


Hematocrit   34.4 % 


 


Mean Corpuscular Volume   91.0 fL 


 


Mean Corpuscular Hemoglobin   29.4 pg 


 


Mean Corpuscular Hemoglobin


Concent 


  


  32.3 g/dl 


 


 


RDW Standard Deviation   45.1 fL 


 


RDW Coefficient of Variation   13.7 % 


 


Platelet Count   314 K/uL 


 


Mean Platelet Volume   9.5 fL 


 


Sodium Level   134 mmol/L 


 


Potassium Level   2.8 mmol/L 


 


Chloride Level   86 mmol/L 


 


Carbon Dioxide Level   44 mmol/L 


 


Anion Gap   4.0 mmol/L 


 


Blood Urea Nitrogen   25 mg/dl 


 


Creatinine   0.70 mg/dl 


 


Est Creatinine Clear Calc


Drug Dose 


  


  76.4 ml/min 


 


 


Estimated GFR (


American) 


  


  93.5 


 


 


Estimated GFR (Non-


American 


  


  80.7 


 


 


BUN/Creatinine Ratio   35.7 


 


Random Glucose   230 mg/dl 


 


Calcium Level   8.1 mg/dl 


 


Magnesium Level   2.0 mg/dl

## 2018-01-08 NOTE — CARDIOLOGY FOLLOW-UP
Subjective


General


Date of Service:


Jan 8, 2018.


Pt evaluation today including:  conversation w/ patient, physical exam, chart 

review, lab review, review of studies, review of inpatient medication list





History of Present Illness


The patient is a 82 year old female seen in follow-up.


I/O negative 3600cc over last 24 hours.


Respiratory status has improved.


Creatinine remains stable. 


Hypokalemia noted.


no edema 


Denies CP.


Remains in atrial flutter on telemetry with an average heart rate of 95 bpm.





Allergies


Coded Allergies:  


     Sulfa Drugs (Verified  Allergy, Unknown, `, 5/18/14)


     Sulfamethoxazole (Verified  Allergy, Unknown, `, 5/18/14)


     Trimethoprim (Verified  Allergy, Unknown, `, 5/18/14)





Social History


Smoking Status:  Current Some Day Smoker


Hx Tobacco Use In Past Year?:  Yes


Hx Alcohol Use - Type And Amou:  No


Hx Substance Use - Type And Am:  No





Problem List


Medical Problems:


(1) Acute on chronic respiratory failure with hypoxia and hypercapnia


Status: Acute  





(2) Ankle fracture, left


Status: Acute  





(3) Bimalleolar ankle fracture


Status: Acute  





(4) Hyponatremia


Status: Acute  





(5) Pneumonia


Status: Acute  











Review of Systems


Respiratory:  + cough, + sputum, + dyspnea on exertion, No wheezing, No 

shortness of breath, No dyspnea at rest, No hemoptysis


Cardiac:  No chest pain, No orthopnea, No PND, No edema, No claudication, No 

palpitations





Physical Exam


Vital Signs





Last Vital Signs Documentation








  Date Time  Temp Pulse Resp B/P (MAP) Pulse Ox O2 Delivery O2 Flow Rate FiO2


 


1/8/18 08:00      BiPAP  


 


1/8/18 07:13 36.9 93 20 106/62 (77) 94   


 


1/8/18 06:59        70


 


1/7/18 19:06       50.0 











Physical Exam


Constitutional:  


   Level of Distress:  NAD, chronically ill


Head:  normocephalic


Lungs:  


   Auscultation:  decreased breath sounds, rales/crackles on the left, rales/

crackles on the right


Cardiovascular:  


   Heart Auscultation:  normal S1, normal S2, no murmurs, irregular rate rhythm


Peripheral Pulses:  


   Radial Pulse:  normal on the left, normal on the right


Abdomen:  


   Inspection & Palpation:  soft, non-distended, no tenderness, guarding & 

rebound, no masses


Extremities:  no cyanosis, no edema, no clubbing, no ulcers


Neurologic:  


   Gait & Station:  pertinent finding (no focal motor deficit)


   Cranial Nerves:  grossly intact





Assessment and Plan


Assessment and Plan


FINAL IMPRESSION:


1.  Paroxysmal atrial flutter with rapid ventricular response secondary to 

underlying respiratory insufficiency, hypercapnic respiratory failure, chronic 

obstructive pulmonary disease exacerbation, and pneumonia.


2.  Acute decompensated diastolic heart failure exacerbated by rapid atrial 

flutter as well as positive fluid balance since admission.


     - 3.6 liters negative over past 24 hours with resultant hypokalemia and 

rise in bicarbonate.


3.  Hypertension -- controlled.


4.  Preserved LV and RV function.


5.  Hypokalemia related to diuretic therapy


6.  Hyponatremia.


7.  Left ankle fracture, status post full.


8.  Ulcerative colitis.


9.  Carotid vascular disease with history of prior endarterectomy


 


PLAN AND RECOMMENDATIONS: 


Reduce IV Lasix to 40 mg once daily.


Replace potassium. Repeat BMP this afternoon. 


Continue Cardizem CD 240mg daily.


Continue intravenous heparin as bridge to Coumadin for goal INR of 2.0-3.0.


5mg coumadin ordered. 


Follow fluid balance, GFR, electrolytes, respiratory status closely.


Repeat chest x-ray in AM.





Laboratory Results





Last 24 Hours








Test


  1/7/18


12:48 1/8/18


06:48 1/8/18


09:55 1/8/18


11:20


 


Activated Partial


Thromboplast Time 59.7 SECONDS 


  66.0 SECONDS 


  


  


 


 


Partial Thromboplastin Ratio 2.3  2.5   


 


White Blood Count  17.77 K/uL   


 


Red Blood Count  3.78 M/uL   


 


Hemoglobin  11.1 g/dL   


 


Hematocrit  34.4 %   


 


Mean Corpuscular Volume  91.0 fL   


 


Mean Corpuscular Hemoglobin  29.4 pg   


 


Mean Corpuscular Hemoglobin


Concent 


  32.3 g/dl 


  


  


 


 


RDW Standard Deviation  45.1 fL   


 


RDW Coefficient of Variation  13.7 %   


 


Platelet Count  314 K/uL   


 


Mean Platelet Volume  9.5 fL   


 


Arterial Blood pH  7.44   


 


Arterial Blood Partial


Pressure CO2 


  70 mmHg 


  


  


 


 


Arterial Blood Partial


Pressure O2 


  79 mm/Hg 


  


  


 


 


Arterial Blood HCO3  46 mmol/L   


 


Arterial Blood Oxygen


Saturation 


  94.6 % 


  


  


 


 


Arterial Blood Base Excess  18.7 mEq/L   


 


Arterial Blood Gas Delivery  70%   


 


Alberto Test  POS   


 


Sodium Level  134 mmol/L   


 


Potassium Level  2.8 mmol/L   


 


Chloride Level  86 mmol/L   


 


Carbon Dioxide Level  44 mmol/L   


 


Anion Gap  4.0 mmol/L   


 


Blood Urea Nitrogen  25 mg/dl   


 


Creatinine  0.70 mg/dl   


 


Est Creatinine Clear Calc


Drug Dose 


  76.4 ml/min 


  


  


 


 


Estimated GFR (


American) 


  93.5 


  


  


 


 


Estimated GFR (Non-


American 


  80.7 


  


  


 


 


BUN/Creatinine Ratio  35.7   


 


Random Glucose  230 mg/dl   


 


Calcium Level  8.1 mg/dl   


 


Magnesium Level  2.0 mg/dl   


 


Procalcitonin   0.27 ng/ml

## 2018-01-08 NOTE — PROGRESS NOTE
Internal Med Progress Note


Date of Service:


Jan 8, 2018.


Provider Documentation:





SUBJECTIVE:





The patient was seen and examined 


Complains of minimal SOB at rest


OOB in a Chair


Denies any other symptoms








OBJECTIVE:





Vital Signs-as noted below





Exam:


General-Minimal distress at rest


Eyes-normal


ENT-normal


Neck-supple


Lungs-Decreased breath sound bilaterally 


Minimal wheezing bilaterally 


Heart-Regular,no murmur 


Abdomen-Benign,no masses,bowel sound present 


Extremities-No edema 


Neuro-AAOx3





Lab data as noted below.


ASSESSMENT & PLAN:








Acute hypercapnic respiratory failure 


Secondary to COPD exacerbation and complicated by Pneumonia


CXR showed dense bibasilar airspace consolidation, right greater than left with 

associated pleural effusions.


ABG on admission showed respiratory acidosis with comp


Has been on  Levaquin and Zosyn


Negative influenza test 


Continue Solumedrol


Continue BIPAP with intermittent  high flow supplement oxygen 


Blood cx no growth


CT with PE protocol showed no evidence for PE. Dense bilateral lower lobe 

atelectasis/consolidation. Patchy airspace


opacities are also present within the right upper lobe lingula and right middle 

lobe.


Advised to USE BIPAP as much as she can to improve CO2 level and improve 

drowsiness


Lasix 40mg BID ot help fluid overload 


IV Vancomycin added to broaden the spectrum 


ECHO


* The echocardiogram is extremely technically limited.


* Patient was sitting upright for the test.


* There is no significant pericardial effusion noted.


* The left ventricular systolic function is grossly normal on limited 

visualization.


* The right ventricular systolic function is grossly normal.


* The valves are poorly visualized.


* There is no significant valvular stenosis or regurgitation on technically 

limited Doppler evaluation.








Paroxysmal atrial flutter with rapid ventricular response


Due to acute hypercapnic respiratory failure


Received Cardizem and digoxin


Continue on heparin drip till therapeutic with Coumadin 


Continue Cardizem 240mg





Hyponatremia


Possible related to hypovolemia  


Na on admission 128


Na today 134


Improved





Hypokalemia


K 3.9


Pt is on lisinopril (might not be a good candidate for ACEI due to hyperkalemia)


Will put on a low K diet


If stay high, will give Kayexalate


Continue monitor BMP


Remains Hypokalemic


Will supplement K and monitor


Check Mag 





Left Ankle fracture s/p fall 


Left ankle xray showed distal tibial and fibular fractures as above with mild 

lateral subluxation of the talus.


Ortho on board


Continue required high flow oxygen and BIPAP for acute hypercapnic respiratory 

failure 


Continue to postpone surgery until respiratory status improves





Right pelvic ring fracture, 


Pelvis xray showed an age indeterminant right pubic ring fracture, possibly 

chronic.


Stable








Hypertension


BP elevated 


Continue lisinopril.  


will add amlodipine


PRN clonidine


Continue monitor BP








Ulcerative colitis


On Pentasa


Stable





Depression


On Celexa


Stable





DVT px 


On heparin drip





CODE STATUS DNR 





DISPOSITION


Continue monitor in tele


Consultants:


Pulmonary





Vital Signs:











  Date Time  Temp Pulse Resp B/P (MAP) Pulse Ox O2 Delivery O2 Flow Rate FiO2


 


1/8/18 15:03 37.3 90 20 151/84 (106) 94 High Flow Oxygen  


 


1/8/18 14:17  93 18  93 Nasal Cannula 50.0 70


 


1/8/18 12:00      High Flow Oxygen  


 


1/8/18 11:42 37.0 100 20 161/75 (103) 95 High Flow Oxygen  


 


1/8/18 08:00      BiPAP  


 


1/8/18 07:13 36.9 93 20 106/62 (77) 94 BiPAP  


 


1/8/18 06:59  97 18  95 BiPAP/CPAP  70


 


1/8/18 05:22  88   96   70


 


1/8/18 04:00      BiPAP  


 


1/8/18 03:33 36.6 97 17 133/87 (102) 96 CPAP  


 


1/8/18 01:47  87   95   70


 


1/8/18 01:47  87 18  95 BiPAP/CPAP  70


 


1/8/18 00:00      BiPAP  


 


1/7/18 23:33 36.7 91 18 152/83 (106) 95 CPAP  


 


1/7/18 22:17  95   91   70


 


1/7/18 20:00      BiPAP  


 


1/7/18 19:33 36.2 90 22 132/95 (107) 95 High Flow Oxygen  


 


1/7/18 19:06  91 18  95 Nasal Cannula 50.0 75


 


1/7/18 16:14      BiPAP  








Lab Results:





Results Past 24 Hours








Test


  1/8/18


06:48 1/8/18


09:55 1/8/18


11:20 1/8/18


13:50 Range/Units


 


 


White Blood Count 17.77    4.8-10.8  K/uL


 


Red Blood Count 3.78    4.2-5.4  M/uL


 


Hemoglobin 11.1    12.0-16.0  g/dL


 


Hematocrit 34.4    37-47  %


 


Mean Corpuscular Volume 91.0      fL


 


Mean Corpuscular Hemoglobin 29.4    25-34  pg


 


Mean Corpuscular Hemoglobin


Concent 32.3


  


  


  


  32-36  g/dl


 


 


RDW Standard Deviation 45.1    36.4-46.3  fL


 


RDW Coefficient of Variation 13.7    11.5-14.5  %


 


Platelet Count 314    130-400  K/uL


 


Mean Platelet Volume 9.5    7.4-10.4  fL


 


Activated Partial


Thromboplast Time 66.0


  


  


  


  21.0-31.0


SECONDS


 


Partial Thromboplastin Ratio 2.5     


 


Arterial Blood pH 7.44    7.35-7.45  


 


Arterial Blood Partial


Pressure CO2 70


  


  


  


  35-46  mmHg


 


 


Arterial Blood Partial


Pressure O2 79


  


  


  


  80-95  mm/Hg


 


 


Arterial Blood HCO3 46    19-24  mmol/L


 


Arterial Blood Oxygen


Saturation 94.6


  


  


  


  90-95  %


 


 


Arterial Blood Base Excess 18.7    -9-1.8  mEq/L


 


Arterial Blood Gas Delivery 70%     


 


Alberto Test POS    POS  


 


Sodium Level 134   132 136-145  mmol/L


 


Potassium Level 2.8   3.0 3.5-5.1  mmol/L


 


Chloride Level 86   81   mmol/L


 


Carbon Dioxide Level 44   44 21-32  mmol/L


 


Anion Gap 4.0   7.0 3-11  mmol/L


 


Blood Urea Nitrogen 25   25 7-18  mg/dl


 


Creatinine


  0.70


  


  


  0.99


  0.60-1.20


mg/dl


 


Est Creatinine Clear Calc


Drug Dose 76.4


  


  


  54.0


   ml/min


 


 


Estimated GFR (


American) 93.5


  


  


  61.5


   


 


 


Estimated GFR (Non-


American 80.7


  


  


  53.1


   


 


 


BUN/Creatinine Ratio 35.7   25.1 10-20  


 


Random Glucose 230   313 70-99  mg/dl


 


Calcium Level 8.1   8.0 8.5-10.1  mg/dl


 


Magnesium Level 2.0    1.8-2.4  mg/dl


 


Procalcitonin  0.27   0-0.5  ng/ml


 


Prothrombin Time


  


  


  12.0


  


  9.0-12.0


SECONDS


 


Prothromb Time International


Ratio 


  


  1.1


  


  0.9-1.1  


 


 


Beta-Hydroxybutyric Acid    0.92 0.2-2.81  mg/dL

## 2018-01-09 VITALS
OXYGEN SATURATION: 90 % | HEART RATE: 109 BPM | TEMPERATURE: 97.88 F | SYSTOLIC BLOOD PRESSURE: 173 MMHG | DIASTOLIC BLOOD PRESSURE: 90 MMHG

## 2018-01-09 VITALS
OXYGEN SATURATION: 92 % | SYSTOLIC BLOOD PRESSURE: 133 MMHG | DIASTOLIC BLOOD PRESSURE: 70 MMHG | HEART RATE: 90 BPM | TEMPERATURE: 98.24 F

## 2018-01-09 VITALS — HEART RATE: 104 BPM | OXYGEN SATURATION: 89 %

## 2018-01-09 VITALS
TEMPERATURE: 98.42 F | HEART RATE: 94 BPM | DIASTOLIC BLOOD PRESSURE: 112 MMHG | SYSTOLIC BLOOD PRESSURE: 209 MMHG | OXYGEN SATURATION: 90 %

## 2018-01-09 VITALS — OXYGEN SATURATION: 91 % | HEART RATE: 93 BPM

## 2018-01-09 VITALS
DIASTOLIC BLOOD PRESSURE: 90 MMHG | HEART RATE: 110 BPM | SYSTOLIC BLOOD PRESSURE: 149 MMHG | OXYGEN SATURATION: 90 % | TEMPERATURE: 97.88 F

## 2018-01-09 VITALS
TEMPERATURE: 98.6 F | OXYGEN SATURATION: 93 % | DIASTOLIC BLOOD PRESSURE: 80 MMHG | SYSTOLIC BLOOD PRESSURE: 192 MMHG | HEART RATE: 92 BPM

## 2018-01-09 VITALS — HEART RATE: 87 BPM | OXYGEN SATURATION: 92 %

## 2018-01-09 VITALS — OXYGEN SATURATION: 89 % | HEART RATE: 104 BPM

## 2018-01-09 VITALS
HEART RATE: 95 BPM | SYSTOLIC BLOOD PRESSURE: 174 MMHG | OXYGEN SATURATION: 91 % | DIASTOLIC BLOOD PRESSURE: 95 MMHG | TEMPERATURE: 97.88 F

## 2018-01-09 VITALS — OXYGEN SATURATION: 95 % | HEART RATE: 89 BPM

## 2018-01-09 LAB
BASOPHILS # BLD: 0.01 K/UL (ref 0–0.2)
BASOPHILS NFR BLD: 0.1 %
BUN SERPL-MCNC: 26 MG/DL (ref 7–18)
CALCIUM SERPL-MCNC: 8.2 MG/DL (ref 8.5–10.1)
CO2 SERPL-SCNC: 47 MMOL/L (ref 21–32)
CREAT SERPL-MCNC: 0.67 MG/DL (ref 0.6–1.2)
EOS ABS #: 0 K/UL (ref 0–0.5)
EOSINOPHIL NFR BLD AUTO: 273 K/UL (ref 130–400)
GLUCOSE SERPL-MCNC: 223 MG/DL (ref 70–99)
HCT VFR BLD CALC: 34.4 % (ref 37–47)
HGB BLD-MCNC: 11.4 G/DL (ref 12–16)
IG#: 0.21 K/UL (ref 0–0.02)
IMM GRANULOCYTES NFR BLD AUTO: 4.5 %
INR PPP: 1.2 (ref 0.9–1.1)
LYMPHOCYTES # BLD: 0.7 K/UL (ref 1.2–3.4)
MCH RBC QN AUTO: 29.9 PG (ref 25–34)
MCHC RBC AUTO-ENTMCNC: 33.1 G/DL (ref 32–36)
MCV RBC AUTO: 90.3 FL (ref 80–100)
MONO ABS #: 1.05 K/UL (ref 0.11–0.59)
MONOCYTES NFR BLD: 6.7 %
NEUT ABS #: 13.75 K/UL (ref 1.4–6.5)
NEUTROPHILS # BLD AUTO: 0 %
NEUTROPHILS NFR BLD AUTO: 87.4 %
PMV BLD AUTO: 9.6 FL (ref 7.4–10.4)
POTASSIUM SERPL-SCNC: 3.1 MMOL/L (ref 3.5–5.1)
PTT PATIENT: 63.2 SECONDS (ref 21–31)
RED CELL DISTRIBUTION WIDTH CV: 13.7 % (ref 11.5–14.5)
RED CELL DISTRIBUTION WIDTH SD: 45.1 FL (ref 36.4–46.3)
SODIUM SERPL-SCNC: 131 MMOL/L (ref 136–145)
WBC # BLD AUTO: 15.72 K/UL (ref 4.8–10.8)

## 2018-01-09 RX ADMIN — METHYLPREDNISOLONE SODIUM SUCCINATE SCH MLS/MIN: 1 INJECTION, POWDER, FOR SOLUTION INTRAMUSCULAR; INTRAVENOUS at 10:11

## 2018-01-09 RX ADMIN — ACETYLCYSTEINE SCH ML: 200 SOLUTION ORAL; RESPIRATORY (INHALATION) at 19:30

## 2018-01-09 RX ADMIN — SIMVASTATIN SCH MG: 20 TABLET, FILM COATED ORAL at 20:24

## 2018-01-09 RX ADMIN — MIRTAZAPINE SCH MG: 15 TABLET, FILM COATED ORAL at 20:24

## 2018-01-09 RX ADMIN — PANTOPRAZOLE SCH MG: 40 TABLET, DELAYED RELEASE ORAL at 10:11

## 2018-01-09 RX ADMIN — MESALAMINE SCH MG: 250 CAPSULE ORAL at 10:11

## 2018-01-09 RX ADMIN — LATANOPROST SCH DROPS: 50 SOLUTION/ DROPS OPHTHALMIC at 20:19

## 2018-01-09 RX ADMIN — PIPERACILLIN SODIUM, TAZOBACTAM SODIUM SCH MLS/HR: 4; .5 INJECTION, POWDER, LYOPHILIZED, FOR SOLUTION INTRAVENOUS at 18:25

## 2018-01-09 RX ADMIN — NYSTATIN SCH ML: 100000 SUSPENSION ORAL at 12:49

## 2018-01-09 RX ADMIN — LEVALBUTEROL HYDROCHLORIDE SCH MG: 1.25 SOLUTION RESPIRATORY (INHALATION) at 07:05

## 2018-01-09 RX ADMIN — PIPERACILLIN SODIUM, TAZOBACTAM SODIUM SCH MLS/HR: 4; .5 INJECTION, POWDER, LYOPHILIZED, FOR SOLUTION INTRAVENOUS at 12:49

## 2018-01-09 RX ADMIN — NYSTATIN SCH ML: 100000 SUSPENSION ORAL at 20:20

## 2018-01-09 RX ADMIN — STANDARDIZED SENNA CONCENTRATE AND DOCUSATE SODIUM SCH TAB: 8.6; 5 TABLET ORAL at 10:12

## 2018-01-09 RX ADMIN — LEVALBUTEROL HYDROCHLORIDE SCH MG: 1.25 SOLUTION RESPIRATORY (INHALATION) at 19:30

## 2018-01-09 RX ADMIN — CITALOPRAM HYDROBROMIDE SCH MG: 40 TABLET ORAL at 10:12

## 2018-01-09 RX ADMIN — LEVALBUTEROL HYDROCHLORIDE SCH MG: 1.25 SOLUTION RESPIRATORY (INHALATION) at 02:25

## 2018-01-09 RX ADMIN — HEPARIN SODIUM PRN MLS/HR: 5000 INJECTION, SOLUTION INTRAVENOUS at 18:22

## 2018-01-09 RX ADMIN — MESALAMINE SCH MG: 250 CAPSULE ORAL at 20:20

## 2018-01-09 RX ADMIN — NYSTATIN SCH ML: 100000 SUSPENSION ORAL at 16:12

## 2018-01-09 RX ADMIN — LEVALBUTEROL HYDROCHLORIDE SCH MG: 1.25 SOLUTION RESPIRATORY (INHALATION) at 14:02

## 2018-01-09 RX ADMIN — INSULIN ASPART SCH UNITS: 100 INJECTION, SOLUTION INTRAVENOUS; SUBCUTANEOUS at 20:27

## 2018-01-09 RX ADMIN — METHYLPREDNISOLONE SODIUM SUCCINATE SCH MLS/MIN: 1 INJECTION, POWDER, FOR SOLUTION INTRAMUSCULAR; INTRAVENOUS at 20:19

## 2018-01-09 RX ADMIN — PIPERACILLIN SODIUM, TAZOBACTAM SODIUM SCH MLS/HR: 4; .5 INJECTION, POWDER, LYOPHILIZED, FOR SOLUTION INTRAVENOUS at 12:44

## 2018-01-09 RX ADMIN — ACETYLCYSTEINE SCH ML: 200 SOLUTION ORAL; RESPIRATORY (INHALATION) at 07:05

## 2018-01-09 RX ADMIN — LEVOFLOXACIN SCH MLS/HR: 5 INJECTION, SOLUTION INTRAVENOUS at 14:03

## 2018-01-09 RX ADMIN — NYSTATIN SCH ML: 100000 SUSPENSION ORAL at 10:11

## 2018-01-09 RX ADMIN — DILTIAZEM HYDROCHLORIDE SCH MG: 240 CAPSULE, EXTENDED RELEASE ORAL at 10:12

## 2018-01-09 NOTE — PULMONOLOGY PROGRESS NOTE
Pulmonary Progress Note


Date of Service


Jan 9, 2018.





Attending


Dr. Marquis





Subjective


Patient seen and examined. She is feeling better but still requiring high flow 

O2 and large volume supplemental oxygen. She did tolerate BiPAP again last 

night for most of the evening and sleeping well this morning when I first saw 

her. She has no fever or chills. She continues with cough which is primarily 

nonproductive. She has no chest pain or tightness.





I did have a long discussion with her daughter. Patient is fairly nonambulatory 

and only walks 10-15 feet most of the time. She does live in a single-story 

ranch home and maximum distance is about 40 feet that she would need to walk. 

She does have a walker and thus far has not had a significant problem with 

falls. Her daughter lives in an apartment in the basement of the home and is 

available to monitor.





Discussion primarily revolved around need for surgery. Patient primarily needs 

to transfer from bed to a chair and be able to ambulate with walker for short 

distances.





Objective





Gen: She is awake and oriented. 


CVS: Irregularly irregular, rate in the low 100s


ENT: BiPAP in place. Taken off for exam and patient placed on Hi Aditya O2. Oral 

candidiasis improved


Lungs: decreased breath sound bilaterally, crackles at bases. No significant 

improvement


Abd: Soft, NT, ND, BS present


Ext: left lower extremity in splint, right lower extremity trace edema, chronic 

venous stasis. 





Labs: 


   ABG 1/8/2018 -  7.44/70/79/46/94.6% on 70% FiO2 BiPAP


   ABG 01/06/2018--7.41/63/71/39/92.1% on 70% FiO2 BiPAP


   ABG 01/05/2018--7.27/80/116/36/96.7% on high flow nasal cannula


   


Imaging reviewed. 





CTA chest 1/3/2017


   IMPRESSION:  


1. Study compromised by respiratory motion artifact


2. No evidence of acute pulmonary embolism


3. No evidence of pathologic adenopathy


4. 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


5. Dense bilateral lower lobe atelectasis/consolidation. Patchy airspace


opacities are also present within the right upper lobe lingula and right middle


lobe.


6. Small bilateral pleural effusions


7. Severe L1 compression fracture with mild retropulsion.








Medications reviewed.





Assessment & Plan


Acute on chronic hypercapnic hypoxic respiratory failure


COPD (unknown FEV1) in acute exacerbation


Hypertensive urgency


Possible diastolic dysfunction


Electrolyte imbalance


Tobacco use disorder


Morbid obesity


Atrial fibrillation


Pneumonia





Mrs. Pittman is a 82-year-old female who presents with acute on chronic 

hypercapnic respiratory failure with status post fall and sustaining left 

bimalleolar fracture.  She is on chronic long-term oxygen therapy at 2 L/m 

during the day and 4 L/m at night, for COPD. Mrs. Pittman continues to smoke 1/2 

PPD by going into her bathroom without supplemental O2 and using the bathroom 

fan. 


She tolerated BIPAP overnight and committed to continuing use while inpatient. 


BP is stable 





HYPERCAPNIC/HYPOXIC  ACUTE ON CHRONIC RESPIRATORY FAILURE


Continues with pulmonary congestion, pleural effusions, atelectasis as compared 

to 12/22/16 CXR although this may be her new baseline


ABG pH 7.44 with a pCO2 70 with full awareness suggests that this is baseline 

for her due to chronic COPD and continued tobacco abuse


Continue HiFlo O2 during the day and BiPAP at night


On dual antibiotics with rising WBC - suspect elevated WBC due to steroids - 

check procalcitonin level


Patient is negative for influenza A&B per Ag study - due to age and 

comorbidities, question if PCR should be checked


Continue methylprednisolone 40 mg IV q12h


Continue Levalbuterol Nebs


Follow oxygenation


Ambulate as tolerated


OOB to chair as tolerated but has not been oob secpondary to instable LLE fx


Continue aggressive pulmonary toileting


Discussed case with orthopedics. They feel that surgery is needed as patient 

will not heal with needed union. We'll continue to follow respiratory status 

and discuss with family again tomorrow risks versus benefits.





BILATERAL PLEURAL EFFUSIONS


Component of atelectasis masking validity of pleural effusions


Will do bedside ultra sound to check effusions


Continue incentive spirometry and flutter valve therapy as tolerated





ATRIAL FIBRILLATION


Continues on heparin gtt


Unaware of tachyarrhythmia


Rate continue in the low 100s


Cardiology consulted. They will continue to manage





HYPERTENSION


Cardiology following


BP better today





TOBACCO ABUSE


Discussed need for cessation


Caution with use os O2 in the house with concurrent smoking in the house





DVT PROPHYLAXIS


Heparin gtt





In summary, the patient is not showing much improvement from respiratory 

standpoint. Discussed possibility of spinal or epidural anesthesia. Orthopedics 

would prefer open reduction internal fixation. We'll have to follow on a daily 

basis regarding improvement and continued risk for anesthesia





Thank you for including us in the care of this patient. Please refer to Dr. Marquis's addendum for further recommendations





Physician Supervision Note:





I was present with Arvin Bledsoe PA-C during the history and exam. I discussed 

the case with him and agree with the findings and plan as documented in the 

note.  Any exceptions or clarifications are listed here: 





Patient with acute on chronic respiratory failure, advanced COPD on home O2, non

-compliant with therapy and still smoking, now awaiting malleolar fracture 

repair. Has worsening respiratory failure now, on BIPAP alternating with high-

flow.


Continue treatment for COPD exacerbation, nocturnal BIPAP.


Still not optimized for surgery. Recommend, if possible, spinal or local 

anesthesia, general anesthesia to be avoided.


She has poor functional status to begin with, so rushing to repair the fracture 

would not result in a much earlier mobilization.





Documented By:  Karl Marquis MD





Data


Medications:





Current Inpatient Medications








 Medications


  (Trade)  Dose


 Ordered  Sig/Donna


 Route  Start Time


 Stop Time Status Last Admin


Dose Admin


 


 Levalbuterol


  (Xopenex 1.25MG/


 3ML Neb)  1.25 mg  Q6R


 INH  1/1/18 22:30


 1/31/18 22:29  1/9/18 07:05


1.25 MG


 


 Levofloxacin


  (Consult)  1 ea  UD  PRN


 N/A  1/2/18 01:09


 1/11/18 14:00   


 


 


 Tramadol HCl


  (Ultram Tab)  50 mg  Q6H  PRN


 PO  1/1/18 21:30


 1/31/18 21:29  1/2/18 07:55


50 MG


 


 Clonidine HCl


  (Catapres Tab)  0.1 mg  Q6H  PRN


 PO  1/1/18 21:30


 1/31/18 21:29 Future Hold 1/3/18 16:21


0.1 MG


 


 Citalopram


 Hydrobromide


  (celeXA TAB)  40 mg  DAILY


 PO  1/2/18 09:00


 2/1/18 08:59  1/9/18 10:12


40 MG


 


 Latanoprost


  (Xalatan Oph


 Soln)  1 drops  HS


 OP  1/2/18 21:00


 2/1/18 20:59  1/8/18 19:43


1 DROPS


 


 Lisinopril


  (Zestril Tab)  20 mg  DAILY


 PO  1/2/18 09:00


 2/1/18 08:59 Future Hold 1/5/18 09:27


20 MG


 


 Mesalamine


  (Pentasa


 Controlled Rel


 Cap)  1,000 mg  BID


 PO  1/2/18 09:00


 2/1/18 08:59  1/9/18 10:11


1,000 MG


 


 Mirtazapine


  (Remeron Tab)  15 mg  HS


 PO  1/2/18 21:00


 2/1/18 20:59  1/8/18 19:43


15 MG


 


 Pantoprazole


 Sodium


  (Protonix Tab)  40 mg  DAILY


 PO  1/2/18 09:00


 2/1/18 08:59  1/9/18 10:11


40 MG


 


 Senna/Docusate


 Sodium


  (Senokot S Tab)  1 tab  DAILY


 PO  1/2/18 09:00


 2/1/18 08:59  1/8/18 08:17


1 TAB


 


 Simvastatin


  (Zocor Tab)  20 mg  HS


 PO  1/2/18 21:00


 2/1/18 20:59  1/8/18 19:43


20 MG


 


 Acetaminophen


  (Tylenol Tab)  650 mg  Q4H  PRN


 PO  1/1/18 21:45


 1/31/18 21:44  1/3/18 16:22


650 MG


 


 Ondansetron HCl


  (Zofran Inj)  4 mg  Q6H  PRN


 IV  1/1/18 21:45


 1/31/18 21:44   


 


 


 Piperacillin Sod/


 Tazobactam Sod


 4.5 gm/Dextrose  120 ml @ 


 30 mls/hr  Q8H


 IV  1/2/18 02:00


 1/11/18 19:00  1/8/18 17:01


30 MLS/HR


 


 Levofloxacin 750


 mg/Prmx  150 ml @ 


 100 mls/hr  Q24H


 IV  1/2/18 06:00


 1/11/18 08:00  1/8/18 05:35


100 MLS/HR


 


 Piperacillin Sod/


 Tazobactam Sod


  (Consult)  1 ea  UD  PRN


 N/A  1/2/18 01:15


 1/11/18 17:00   


 


 


 Heparin Sodium/


 Dextrose  500 ml @ 


 25 mls/hr  Q20H PRN


 IV  1/6/18 08:30


 2/5/18 08:29  1/8/18 20:12


25 MLS/HR


 


 Methylprednisolone


 Sodium Succinate


 40 mg/Syringe  0.64 ml @ 


 1.5 mls/min  Q12H


 IV  1/6/18 20:00


 2/5/18 19:59  1/9/18 10:11


1.5 MLS/MIN


 


 Acetylcysteine


  (Mucomyst 20%


 Inh Soln)  3 ml  BIDR


 INH  1/6/18 20:00


 2/5/18 19:59  1/9/18 07:05


3 ML


 


 Diltiazem HCl


  (Cardizem Cd Cap)  240 mg  QAM


 PO  1/7/18 09:00


 2/5/18 08:59  1/9/18 10:12


240 MG


 


 Nystatin


  (Mycostatin Susp)  5 ml  QID


 PO  1/8/18 13:00


 1/11/18 12:59  1/9/18 10:11


5 ML








Vital Signs:











  Date Time  Temp Pulse Resp B/P (MAP) Pulse Ox O2 Delivery O2 Flow Rate FiO2


 


1/9/18 08:00      BiPAP  


 


1/9/18 07:46 36.6 95 24 174/95 (121) 91 CPAP  


 


1/9/18 07:06  104   89   70


 


1/9/18 07:05  104 20  89 BiPAP/CPAP  70


 


1/9/18 04:00      CPAP  


 


1/9/18 03:33 36.6 110 20 149/90 (109) 90 CPAP  


 


1/9/18 02:25  93 20  91 BiPAP/CPAP  70


 


1/9/18 00:00      CPAP  


 


1/8/18 23:33 36.7 94 30 150/83 (105) 94 CPAP  


 


1/8/18 21:54  93   91   70


 


1/8/18 20:00      CPAP  


 


1/8/18 18:46 37.2 96 20 156/83 (107) 93 High Flow Oxygen  


 


1/8/18 17:55  99 18  92 Nasal Cannula 50.0 70


 


1/8/18 16:00      High Flow Oxygen  


 


1/8/18 15:03 37.3 90 20 151/84 (106) 94 High Flow Oxygen  


 


1/8/18 14:17  93 18  93 Nasal Cannula 50.0 70








Laboratory Results:





Last 24 Hours








Test


  1/8/18


13:50 1/9/18


05:46 1/9/18


10:22


 


Sodium Level 132 mmol/L  131 mmol/L  


 


Potassium Level 3.0 mmol/L  3.1 mmol/L  


 


Chloride Level 81 mmol/L  82 mmol/L  


 


Carbon Dioxide Level 44 mmol/L  47 mmol/L  


 


Anion Gap 7.0 mmol/L  2.0 mmol/L  


 


Blood Urea Nitrogen 25 mg/dl  26 mg/dl  


 


Creatinine 0.99 mg/dl  0.67 mg/dl  


 


Est Creatinine Clear Calc


Drug Dose 54.0 ml/min 


  79.8 ml/min 


  


 


 


Estimated GFR (


American) 61.5 


  94.9 


  


 


 


Estimated GFR (Non-


American 53.1 


  81.9 


  


 


 


BUN/Creatinine Ratio 25.1  39.0  


 


Random Glucose 313 mg/dl  223 mg/dl  


 


Calcium Level 8.0 mg/dl  8.2 mg/dl  


 


Beta-Hydroxybutyric Acid 0.92 mg/dL   


 


White Blood Count  15.72 K/uL  


 


Red Blood Count  3.81 M/uL  


 


Hemoglobin  11.4 g/dL  


 


Hematocrit  34.4 %  


 


Mean Corpuscular Volume  90.3 fL  


 


Mean Corpuscular Hemoglobin  29.9 pg  


 


Mean Corpuscular Hemoglobin


Concent 


  33.1 g/dl 


  


 


 


Platelet Count  273 K/uL  


 


Mean Platelet Volume  9.6 fL  


 


Neutrophils (%) (Auto)  87.4 %  


 


Lymphocytes (%) (Auto)  4.5 %  


 


Monocytes (%) (Auto)  6.7 %  


 


Eosinophils (%) (Auto)  0.0 %  


 


Basophils (%) (Auto)  0.1 %  


 


Neutrophils # (Auto)  13.75 K/uL  


 


Lymphocytes # (Auto)  0.70 K/uL  


 


Monocytes # (Auto)  1.05 K/uL  


 


Eosinophils # (Auto)  0.00 K/uL  


 


Basophils # (Auto)  0.01 K/uL  


 


RDW Standard Deviation  45.1 fL  


 


RDW Coefficient of Variation  13.7 %  


 


Immature Granulocyte % (Auto)  1.3 %  


 


Immature Granulocyte # (Auto)  0.21 K/uL  


 


Activated Partial


Thromboplast Time 


  63.2 SECONDS 


  


 


 


Partial Thromboplastin Ratio  2.4  


 


Prothrombin Time   12.9 SECONDS 


 


Prothromb Time International


Ratio 


  


  1.2

## 2018-01-09 NOTE — CARDIOLOGY FOLLOW-UP
Subjective


General


Date of Service:


Jan 9, 2018.


Pt evaluation today including:  conversation w/ patient, physical exam, chart 

review, lab review, review of studies, review of inpatient medication list





History of Present Illness


The patient is a 82 year old female seen in follow-up.


Resting comfortably on BiPAP.


Converted to sinus rhythm with PACs at approximately 9 AM.


No events reported by nursing overnight.


Hypokalemia noted.





Allergies


Coded Allergies:  


     Sulfa Drugs (Verified  Allergy, Unknown, `, 5/18/14)


     Sulfamethoxazole (Verified  Allergy, Unknown, `, 5/18/14)


     Trimethoprim (Verified  Allergy, Unknown, `, 5/18/14)





Social History


Smoking Status:  Current Some Day Smoker


Hx Tobacco Use In Past Year?:  Yes


Hx Alcohol Use - Type And Amou:  No


Hx Substance Use - Type And Am:  No





Problem List


Medical Problems:


(1) Acute on chronic respiratory failure with hypoxia and hypercapnia


Status: Acute  





(2) Ankle fracture, left


Status: Acute  





(3) Bimalleolar ankle fracture


Status: Acute  





(4) Hyponatremia


Status: Acute  





(5) Pneumonia


Status: Acute  











Review of Systems


Respiratory:  + cough, + dyspnea on exertion, No sputum, No wheezing, No 

shortness of breath, No dyspnea at rest, No hemoptysis


Cardiac:  + edema, No chest pain, No orthopnea, No PND, No claudication, No 

palpitations





Physical Exam


Vital Signs





Last Vital Signs Documentation








  Date Time  Temp Pulse Resp B/P (MAP) Pulse Ox O2 Delivery O2 Flow Rate FiO2


 


1/9/18 08:00      BiPAP  


 


1/9/18 07:46 36.6 95 24 174/95 (121) 91   


 


1/9/18 07:06        70


 


1/8/18 17:55       50.0 











Physical Exam


Constitutional:  


   Level of Distress:  NAD, chronically ill


Head:  normocephalic


Lungs:  


   Auscultation:  decreased breath sounds, rales/crackles on the left, rales/

crackles on the right


Cardiovascular:  


   Heart Auscultation:  normal S1, normal S2, no murmurs, irregular rate rhythm


Peripheral Pulses:  


   Radial Pulse:  normal on the left, normal on the right


Abdomen:  


   Inspection & Palpation:  soft, non-distended, no tenderness, guarding & 

rebound, no masses


Extremities:  no cyanosis, no clubbing, no ulcers, edema (trace bilateral 

pretibial edema.)


Neurologic:  


   Gait & Station:  pertinent finding (no focal motor deficit)


   Cranial Nerves:  grossly intact





Assessment and Plan


Assessment and Plan


FINAL IMPRESSION:


1.  Paroxysmal atrial flutter with rapid ventricular response secondary to 

underlying respiratory insufficiency, hypercapnic respiratory failure, chronic 

obstructive pulmonary disease exacerbation, and pneumonia.


     - Converted to sinus rhythm with PACs this morning


2.  Acute decompensated diastolic heart failure exacerbated by rapid atrial 

flutter as well as positive fluid balance since admission.


     -Volume status improved with diuretic therapy


3.  Hypertension -- controlled.


4.  Preserved LV and RV function.


5.  Hypokalemia related to diuretic therapy


6.  Hyponatremia.


7.  Left ankle fracture, status post full.


8.  Ulcerative colitis.


9.  Carotid vascular disease with history of prior endarterectomy


 


PLAN AND RECOMMENDATIONS: 


Hold Lasix today


Replace potassium. 


Continue Cardizem CD 240mg daily.


Continue intravenous heparin as bridge to Coumadin for goal INR of 2.0-3.0.


Repeat INR today.


Follow fluid balance, GFR, electrolytes, respiratory status closely.





Laboratory Results





Last 24 Hours








Test


  1/8/18


11:20 1/8/18


13:50 1/9/18


05:46


 


Prothrombin Time 12.0 SECONDS   


 


Prothromb Time International


Ratio 1.1 


  


  


 


 


Sodium Level  132 mmol/L  131 mmol/L 


 


Potassium Level  3.0 mmol/L  3.1 mmol/L 


 


Chloride Level  81 mmol/L  82 mmol/L 


 


Carbon Dioxide Level  44 mmol/L  47 mmol/L 


 


Anion Gap  7.0 mmol/L  2.0 mmol/L 


 


Blood Urea Nitrogen  25 mg/dl  26 mg/dl 


 


Creatinine  0.99 mg/dl  0.67 mg/dl 


 


Est Creatinine Clear Calc


Drug Dose 


  54.0 ml/min 


  79.8 ml/min 


 


 


Estimated GFR (


American) 


  61.5 


  94.9 


 


 


Estimated GFR (Non-


American 


  53.1 


  81.9 


 


 


BUN/Creatinine Ratio  25.1  39.0 


 


Random Glucose  313 mg/dl  223 mg/dl 


 


Calcium Level  8.0 mg/dl  8.2 mg/dl 


 


Beta-Hydroxybutyric Acid  0.92 mg/dL  


 


White Blood Count   15.72 K/uL 


 


Red Blood Count   3.81 M/uL 


 


Hemoglobin   11.4 g/dL 


 


Hematocrit   34.4 % 


 


Mean Corpuscular Volume   90.3 fL 


 


Mean Corpuscular Hemoglobin   29.9 pg 


 


Mean Corpuscular Hemoglobin


Concent 


  


  33.1 g/dl 


 


 


Platelet Count   273 K/uL 


 


Mean Platelet Volume   9.6 fL 


 


Neutrophils (%) (Auto)   87.4 % 


 


Lymphocytes (%) (Auto)   4.5 % 


 


Monocytes (%) (Auto)   6.7 % 


 


Eosinophils (%) (Auto)   0.0 % 


 


Basophils (%) (Auto)   0.1 % 


 


Neutrophils # (Auto)   13.75 K/uL 


 


Lymphocytes # (Auto)   0.70 K/uL 


 


Monocytes # (Auto)   1.05 K/uL 


 


Eosinophils # (Auto)   0.00 K/uL 


 


Basophils # (Auto)   0.01 K/uL 


 


RDW Standard Deviation   45.1 fL 


 


RDW Coefficient of Variation   13.7 % 


 


Immature Granulocyte % (Auto)   1.3 % 


 


Immature Granulocyte # (Auto)   0.21 K/uL 


 


Activated Partial


Thromboplast Time 


  


  63.2 SECONDS 


 


 


Partial Thromboplastin Ratio   2.4

## 2018-01-09 NOTE — PROGRESS NOTE
Internal Med Progress Note


Date of Service:


Jan 9, 2018.


Provider Documentation:





SUBJECTIVE:





The patient was seen and examined 


Complains of minimal SOB at rest


OOB in a Chair


Denies any other symptoms


Much better today -but requirinf more flow to maintain adequet saturation








OBJECTIVE:





Vital Signs-as noted below





Exam:


General-Minimal distress at rest


Eyes-normal


ENT-normal


Neck-supple


Lungs-Decreased breath sound bilaterally 


Minimal wheezing bilaterally 


Heart-Regular,no murmur 


Abdomen-Benign,no masses,bowel sound present 


Extremities-No edema 


Neuro-AAOx3





Lab data as noted below.


ASSESSMENT & PLAN:








Acute hypercapnic respiratory failure 


Secondary to COPD exacerbation and complicated by Pneumonia


CXR showed dense bibasilar airspace consolidation, right greater than left with 

associated pleural effusions.


ABG on admission showed respiratory acidosis with comp


Has been on  Levaquin and Zosyn


Negative influenza test 


Continue Solumedrol


Continue BIPAP with intermittent  high flow supplement oxygen 


Blood cx no growth


CT with PE protocol showed no evidence for PE. Dense bilateral lower lobe 

atelectasis/consolidation. Patchy airspace


opacities are also present within the right upper lobe lingula and right middle 

lobe.


Advised to USE BIPAP as much as she can to improve CO2 level and improve 

drowsiness


Lasix 40mg BID ot help fluid overload 


IV Vancomycin added to broaden the spectrum -discontinued


ECHO


* The echocardiogram is extremely technically limited.


* Patient was sitting upright for the test.


* There is no significant pericardial effusion noted.


* The left ventricular systolic function is grossly normal on limited 

visualization.


* The right ventricular systolic function is grossly normal.


* The valves are poorly visualized.


* There is no significant valvular stenosis or regurgitation on technically 

limited Doppler evaluation.


Continue current antibiotics for 3 more days 


Clinically much better today 








Paroxysmal atrial flutter with rapid ventricular response


Due to acute hypercapnic respiratory failure


Received Cardizem and digoxin


Continue on heparin drip till therapeutic with Coumadin 


Continue Cardizem 240mg


Rate is controlled





Hyponatremia


Possible related to hypovolemia  


Na on admission 128


Na today 134


Improved





Hypokalemia


K 3.9


Pt is on lisinopril (might not be a good candidate for ACEI due to hyperkalemia)


Will put on a low K diet


If stay high, will give Kayexalate


Continue monitor BMP


Remains Hypokalemic


Will supplement K and monitor


Check Mag 


Hold Lasix for now





Left Ankle fracture s/p fall 


Left ankle xray showed distal tibial and fibular fractures as above with mild 

lateral subluxation of the talus.


Ortho on board


Continue required high flow oxygen and BIPAP for acute hypercapnic respiratory 

failure 


Continue to postpone surgery until respiratory status improves


Likely to have Conservative management of the Ankle


Surgery will carry a higher risk 


The issue was discussed in detailed with the Daughter and the patient





Right pelvic ring fracture, 


Pelvis xray showed an age indeterminant right pubic ring fracture, possibly 

chronic.


Stable








Hypertension


BP elevated 


Continue lisinopril.  


will add amlodipine


PRN clonidine


Continue monitor BP








Ulcerative colitis


On Pentasa


Stable





Depression


On Celexa


Stable





DVT px 


On heparin drip





CODE STATUS DNR 





DISPOSITION


Continue monitor in tele


Consultants:


Pulmonary





Vital Signs:











  Date Time  Temp Pulse Resp B/P (MAP) Pulse Ox O2 Delivery O2 Flow Rate FiO2


 


1/9/18 12:00 36.6 109 28 173/90 (117) 90 High Flow Oxygen  


 


1/9/18 12:00      High Flow Oxygen  


 


1/9/18 08:00      BiPAP  


 


1/9/18 07:46 36.6 95 24 174/95 (121) 91 CPAP  


 


1/9/18 07:06  104   89   70


 


1/9/18 07:05  104 20  89 BiPAP/CPAP  70


 


1/9/18 04:00      CPAP  


 


1/9/18 03:33 36.6 110 20 149/90 (109) 90 CPAP  


 


1/9/18 02:25  93 20  91 BiPAP/CPAP  70


 


1/9/18 00:00      CPAP  


 


1/8/18 23:33 36.7 94 30 150/83 (105) 94 CPAP  


 


1/8/18 21:54  93   91   70


 


1/8/18 20:00      CPAP  


 


1/8/18 18:46 37.2 96 20 156/83 (107) 93 High Flow Oxygen  


 


1/8/18 17:55  99 18  92 Nasal Cannula 50.0 70


 


1/8/18 16:00      High Flow Oxygen  


 


1/8/18 15:03 37.3 90 20 151/84 (106) 94 High Flow Oxygen  


 


1/8/18 14:17  93 18  93 Nasal Cannula 50.0 70








Lab Results:





Results Past 24 Hours








Test


  1/8/18


13:50 1/9/18


05:46 1/9/18


10:22 1/9/18


10:57 Range/Units


 


 


Sodium Level 132 131   136-145  mmol/L


 


Potassium Level 3.0 3.1   3.5-5.1  mmol/L


 


Chloride Level 81 82     mmol/L


 


Carbon Dioxide Level 44 47   21-32  mmol/L


 


Anion Gap 7.0 2.0   3-11  mmol/L


 


Blood Urea Nitrogen 25 26   7-18  mg/dl


 


Creatinine


  0.99


  0.67


  


  


  0.60-1.20


mg/dl


 


Est Creatinine Clear Calc


Drug Dose 54.0


  79.8


  


  


   ml/min


 


 


Estimated GFR (


American) 61.5


  94.9


  


  


   


 


 


Estimated GFR (Non-


American 53.1


  81.9


  


  


   


 


 


BUN/Creatinine Ratio 25.1 39.0   10-20  


 


Random Glucose 313 223   70-99  mg/dl


 


Calcium Level 8.0 8.2   8.5-10.1  mg/dl


 


Beta-Hydroxybutyric Acid 0.92    0.2-2.81  mg/dL


 


White Blood Count  15.72   4.8-10.8  K/uL


 


Red Blood Count  3.81   4.2-5.4  M/uL


 


Hemoglobin  11.4   12.0-16.0  g/dL


 


Hematocrit  34.4   37-47  %


 


Mean Corpuscular Volume  90.3     fL


 


Mean Corpuscular Hemoglobin  29.9   25-34  pg


 


Mean Corpuscular Hemoglobin


Concent 


  33.1


  


  


  32-36  g/dl


 


 


Platelet Count  273   130-400  K/uL


 


Mean Platelet Volume  9.6   7.4-10.4  fL


 


Neutrophils (%) (Auto)  87.4    %


 


Lymphocytes (%) (Auto)  4.5    %


 


Monocytes (%) (Auto)  6.7    %


 


Eosinophils (%) (Auto)  0.0    %


 


Basophils (%) (Auto)  0.1    %


 


Neutrophils # (Auto)  13.75   1.4-6.5  K/uL


 


Lymphocytes # (Auto)  0.70   1.2-3.4  K/uL


 


Monocytes # (Auto)  1.05   0.11-0.59  K/uL


 


Eosinophils # (Auto)  0.00   0-0.5  K/uL


 


Basophils # (Auto)  0.01   0-0.2  K/uL


 


RDW Standard Deviation  45.1   36.4-46.3  fL


 


RDW Coefficient of Variation  13.7   11.5-14.5  %


 


Immature Granulocyte % (Auto)  1.3    %


 


Immature Granulocyte # (Auto)  0.21   0.00-0.02  K/uL


 


Activated Partial


Thromboplast Time 


  63.2


  


  


  21.0-31.0


SECONDS


 


Partial Thromboplastin Ratio  2.4    


 


Prothrombin Time


  


  


  12.9


  


  9.0-12.0


SECONDS


 


Prothromb Time International


Ratio 


  


  1.2


  


  0.9-1.1  


 


 


Bedside Glucose    212 70-90  mg/dl

## 2018-01-10 VITALS
HEART RATE: 81 BPM | DIASTOLIC BLOOD PRESSURE: 66 MMHG | TEMPERATURE: 97.52 F | SYSTOLIC BLOOD PRESSURE: 146 MMHG | OXYGEN SATURATION: 94 %

## 2018-01-10 VITALS — HEART RATE: 82 BPM | OXYGEN SATURATION: 93 %

## 2018-01-10 VITALS
OXYGEN SATURATION: 91 % | HEART RATE: 88 BPM | SYSTOLIC BLOOD PRESSURE: 174 MMHG | TEMPERATURE: 97.88 F | DIASTOLIC BLOOD PRESSURE: 75 MMHG

## 2018-01-10 VITALS
HEART RATE: 98 BPM | TEMPERATURE: 97.88 F | OXYGEN SATURATION: 95 % | DIASTOLIC BLOOD PRESSURE: 76 MMHG | SYSTOLIC BLOOD PRESSURE: 177 MMHG

## 2018-01-10 VITALS
HEART RATE: 92 BPM | OXYGEN SATURATION: 92 % | DIASTOLIC BLOOD PRESSURE: 85 MMHG | SYSTOLIC BLOOD PRESSURE: 183 MMHG | TEMPERATURE: 97.52 F

## 2018-01-10 VITALS — HEART RATE: 80 BPM | OXYGEN SATURATION: 96 % | TEMPERATURE: 97.52 F

## 2018-01-10 VITALS
SYSTOLIC BLOOD PRESSURE: 190 MMHG | TEMPERATURE: 97.7 F | HEART RATE: 82 BPM | OXYGEN SATURATION: 93 % | DIASTOLIC BLOOD PRESSURE: 92 MMHG

## 2018-01-10 VITALS — DIASTOLIC BLOOD PRESSURE: 66 MMHG | SYSTOLIC BLOOD PRESSURE: 146 MMHG

## 2018-01-10 VITALS — OXYGEN SATURATION: 93 % | HEART RATE: 93 BPM

## 2018-01-10 VITALS — OXYGEN SATURATION: 92 % | HEART RATE: 93 BPM

## 2018-01-10 VITALS — HEART RATE: 88 BPM | DIASTOLIC BLOOD PRESSURE: 73 MMHG | SYSTOLIC BLOOD PRESSURE: 181 MMHG

## 2018-01-10 VITALS — SYSTOLIC BLOOD PRESSURE: 198 MMHG | HEART RATE: 91 BPM | DIASTOLIC BLOOD PRESSURE: 74 MMHG

## 2018-01-10 VITALS — OXYGEN SATURATION: 89 % | HEART RATE: 93 BPM

## 2018-01-10 VITALS — OXYGEN SATURATION: 92 % | HEART RATE: 84 BPM

## 2018-01-10 VITALS — OXYGEN SATURATION: 92 % | HEART RATE: 82 BPM

## 2018-01-10 LAB
BUN SERPL-MCNC: 23 MG/DL (ref 7–18)
CALCIUM SERPL-MCNC: 8.4 MG/DL (ref 8.5–10.1)
CO2 SERPL-SCNC: 45 MMOL/L (ref 21–32)
CREAT SERPL-MCNC: 0.74 MG/DL (ref 0.6–1.2)
GLUCOSE SERPL-MCNC: 230 MG/DL (ref 70–99)
INR PPP: 1.3 (ref 0.9–1.1)
POTASSIUM SERPL-SCNC: 3.2 MMOL/L (ref 3.5–5.1)
PTT PATIENT: 69.3 SECONDS (ref 21–31)
SODIUM SERPL-SCNC: 131 MMOL/L (ref 136–145)

## 2018-01-10 RX ADMIN — ACETYLCYSTEINE SCH ML: 200 SOLUTION ORAL; RESPIRATORY (INHALATION) at 19:29

## 2018-01-10 RX ADMIN — LEVALBUTEROL HYDROCHLORIDE SCH MG: 1.25 SOLUTION RESPIRATORY (INHALATION) at 02:37

## 2018-01-10 RX ADMIN — NYSTATIN SCH ML: 100000 SUSPENSION ORAL at 12:36

## 2018-01-10 RX ADMIN — METHYLPREDNISOLONE SODIUM SUCCINATE SCH MLS/MIN: 1 INJECTION, POWDER, FOR SOLUTION INTRAMUSCULAR; INTRAVENOUS at 20:25

## 2018-01-10 RX ADMIN — PIPERACILLIN SODIUM, TAZOBACTAM SODIUM SCH MLS/HR: 4; .5 INJECTION, POWDER, LYOPHILIZED, FOR SOLUTION INTRAVENOUS at 17:24

## 2018-01-10 RX ADMIN — LATANOPROST SCH DROPS: 50 SOLUTION/ DROPS OPHTHALMIC at 20:25

## 2018-01-10 RX ADMIN — MESALAMINE SCH MG: 250 CAPSULE ORAL at 07:35

## 2018-01-10 RX ADMIN — PIPERACILLIN SODIUM, TAZOBACTAM SODIUM SCH MLS/HR: 4; .5 INJECTION, POWDER, LYOPHILIZED, FOR SOLUTION INTRAVENOUS at 10:22

## 2018-01-10 RX ADMIN — MESALAMINE SCH MG: 250 CAPSULE ORAL at 20:29

## 2018-01-10 RX ADMIN — NYSTATIN SCH ML: 100000 SUSPENSION ORAL at 07:36

## 2018-01-10 RX ADMIN — LEVALBUTEROL HYDROCHLORIDE SCH MG: 1.25 SOLUTION RESPIRATORY (INHALATION) at 19:30

## 2018-01-10 RX ADMIN — LEVALBUTEROL HYDROCHLORIDE SCH MG: 1.25 SOLUTION RESPIRATORY (INHALATION) at 14:14

## 2018-01-10 RX ADMIN — NYSTATIN SCH ML: 100000 SUSPENSION ORAL at 16:35

## 2018-01-10 RX ADMIN — NYSTATIN SCH ML: 100000 SUSPENSION ORAL at 20:26

## 2018-01-10 RX ADMIN — CITALOPRAM HYDROBROMIDE SCH MG: 40 TABLET ORAL at 07:36

## 2018-01-10 RX ADMIN — METHYLPREDNISOLONE SODIUM SUCCINATE SCH MLS/MIN: 1 INJECTION, POWDER, FOR SOLUTION INTRAMUSCULAR; INTRAVENOUS at 07:33

## 2018-01-10 RX ADMIN — INSULIN ASPART SCH UNITS: 100 INJECTION, SOLUTION INTRAVENOUS; SUBCUTANEOUS at 07:37

## 2018-01-10 RX ADMIN — PANTOPRAZOLE SCH MG: 40 TABLET, DELAYED RELEASE ORAL at 09:26

## 2018-01-10 RX ADMIN — INSULIN ASPART SCH UNITS: 100 INJECTION, SOLUTION INTRAVENOUS; SUBCUTANEOUS at 12:36

## 2018-01-10 RX ADMIN — LEVALBUTEROL HYDROCHLORIDE SCH MG: 1.25 SOLUTION RESPIRATORY (INHALATION) at 06:55

## 2018-01-10 RX ADMIN — LEVOFLOXACIN SCH MLS/HR: 5 INJECTION, SOLUTION INTRAVENOUS at 06:19

## 2018-01-10 RX ADMIN — STANDARDIZED SENNA CONCENTRATE AND DOCUSATE SODIUM SCH TAB: 8.6; 5 TABLET ORAL at 07:37

## 2018-01-10 RX ADMIN — INSULIN ASPART SCH UNITS: 100 INJECTION, SOLUTION INTRAVENOUS; SUBCUTANEOUS at 16:40

## 2018-01-10 RX ADMIN — DILTIAZEM HYDROCHLORIDE SCH MG: 240 CAPSULE, EXTENDED RELEASE ORAL at 07:34

## 2018-01-10 RX ADMIN — MIRTAZAPINE SCH MG: 15 TABLET, FILM COATED ORAL at 20:28

## 2018-01-10 RX ADMIN — ACETYLCYSTEINE SCH ML: 200 SOLUTION ORAL; RESPIRATORY (INHALATION) at 06:55

## 2018-01-10 RX ADMIN — ACETAMINOPHEN PRN MG: 325 TABLET ORAL at 17:23

## 2018-01-10 RX ADMIN — PIPERACILLIN SODIUM, TAZOBACTAM SODIUM SCH MLS/HR: 4; .5 INJECTION, POWDER, LYOPHILIZED, FOR SOLUTION INTRAVENOUS at 02:31

## 2018-01-10 RX ADMIN — INSULIN ASPART SCH UNITS: 100 INJECTION, SOLUTION INTRAVENOUS; SUBCUTANEOUS at 20:33

## 2018-01-10 RX ADMIN — SIMVASTATIN SCH MG: 20 TABLET, FILM COATED ORAL at 20:27

## 2018-01-10 RX ADMIN — LISINOPRIL SCH MG: 10 TABLET ORAL at 20:30

## 2018-01-10 RX ADMIN — WARFARIN SODIUM SCH MG: 5 TABLET ORAL at 16:35

## 2018-01-10 NOTE — CARDIOLOGY FOLLOW-UP
Subjective


General


Date of Service:


Reji 10, 2018.


Pt evaluation today including:  conversation w/ patient, physical exam, chart 

review, lab review, review of studies, review of inpatient medication list





History of Present Illness


The patient is a 82 year old female seen in follow-up.


Daughter is present at bedside.


Patient more alert today.


Denies shortness of breath however currently on high flow oxygen.


Denies palpitations.


Periods of atrial fibrillation and sinus rhythm with PACs noted on telemetry.


Blood pressure elevated.





Allergies


Coded Allergies:  


     Sulfa Drugs (Verified  Allergy, Unknown, `, 5/18/14)


     Sulfamethoxazole (Verified  Allergy, Unknown, `, 5/18/14)


     Trimethoprim (Verified  Allergy, Unknown, `, 5/18/14)





Social History


Smoking Status:  Current Some Day Smoker


Hx Tobacco Use In Past Year?:  Yes


Hx Alcohol Use - Type And Amou:  No


Hx Substance Use - Type And Am:  No





Problem List


Medical Problems:


(1) Acute on chronic respiratory failure with hypoxia and hypercapnia


Status: Acute  





(2) Ankle fracture, left


Status: Acute  





(3) Bimalleolar ankle fracture


Status: Acute  





(4) Hyponatremia


Status: Acute  





(5) Pneumonia


Status: Acute  











Review of Systems


Respiratory:  + cough, + dyspnea on exertion, No sputum, No wheezing, No 

shortness of breath, No hemoptysis


Cardiac:  No chest pain, No orthopnea, No PND, No edema, No claudication, No 

palpitations





Physical Exam


Vital Signs





Last Vital Signs Documentation








  Date Time  Temp Pulse Resp B/P (MAP) Pulse Ox O2 Delivery O2 Flow Rate FiO2


 


1/10/18 14:15  93 24  89 Nasal Cannula 50.0 80


 


1/10/18 11:32 36.6   174/75 (108)    











Physical Exam


Constitutional:  


   Level of Distress:  NAD, chronically ill


Head:  normocephalic


Neck:  supple, trachea midline


Lungs:  


   Auscultation:  decreased breath sounds, rales/crackles on the left, rales/

crackles on the right


Cardiovascular:  


   Heart Auscultation:  normal S1, normal S2, no murmurs, irregular rate rhythm


Peripheral Pulses:  


   Radial Pulse:  normal on the left, normal on the right


Abdomen:  


   Inspection & Palpation:  soft, non-distended, no tenderness, guarding & 

rebound, no masses


Extremities:  no cyanosis, no clubbing, no ulcers, edema (trace bilateral 

pretibial edema.)


Neurologic:  


   Gait & Station:  pertinent finding (no focal motor deficit)


   Cranial Nerves:  grossly intact





Assessment and Plan


Assessment and Plan


FINAL IMPRESSION:


1.  Paroxysmal atrial fib/flutter with rapid ventricular response secondary to 

underlying respiratory insufficiency, hypercapnic respiratory failure, chronic 

obstructive pulmonary disease exacerbation, and pneumonia.


     -Intermittent periods of sinus rhythm and A. fib / flutter noted on 

telemetry with fairly controlled heart rates.


2.  Acute decompensated diastolic heart failure exacerbated by rapid atrial 

flutter as well as positive fluid balance since admission.


     -Volume status improved 


3.  Hypertension -- uncontrolled.


4.  Preserved LV and RV function.


5.  Hypokalemia 


6.  Hyponatremia.


7.  Left ankle fracture, status post full.


8.  Ulcerative colitis.


9.  Carotid vascular disease with history of prior endarterectomy


 


PLAN AND RECOMMENDATIONS: 


Increase lisinopril 10mg BID.


Replace potassium. 


Continue Cardizem CD 240mg daily.


Restart Lasix 20 mg daily (outaptient dose)


Continue intravenous heparin as bridge to Coumadin for goal INR of 2.0-3.0.


Repeat INR in AM.


Follow fluid balance, GFR, electrolytes, respiratory status closely.


Recommend remove Fuentes catheter - discussed with nursing.


Consider re-consult pulmonary medicine for evaluation of persistent hypercapnia.





Laboratory Results





Last 24 Hours








Test


  1/9/18


16:00 1/9/18


20:14 1/10/18


06:16 1/10/18


06:21


 


Bedside Glucose 317 mg/dl  289 mg/dl  275 mg/dl  


 


Prothrombin Time    13.6 SECONDS 


 


Prothromb Time International


Ratio 


  


  


  1.3 


 


 


Activated Partial


Thromboplast Time 


  


  


  69.3 SECONDS 


 


 


Partial Thromboplastin Ratio    2.7 


 


Sodium Level    131 mmol/L 


 


Potassium Level    3.2 mmol/L 


 


Chloride Level    83 mmol/L 


 


Carbon Dioxide Level    45 mmol/L 


 


Anion Gap    3.0 mmol/L 


 


Blood Urea Nitrogen    23 mg/dl 


 


Creatinine    0.74 mg/dl 


 


Est Creatinine Clear Calc


Drug Dose 


  


  


  72.1 ml/min 


 


 


Estimated GFR (


American) 


  


  


  87.4 


 


 


Estimated GFR (Non-


American 


  


  


  75.4 


 


 


BUN/Creatinine Ratio    31.6 


 


Random Glucose    230 mg/dl 


 


Calcium Level    8.4 mg/dl 


 


Test


  1/10/18


11:16 


  


  


 


 


Bedside Glucose 224 mg/dl

## 2018-01-10 NOTE — PROGRESS NOTE
Internal Med Progress Note


Date of Service:


Reji 10, 2018.


Provider Documentation:





SUBJECTIVE:





The patient was seen and examined 


Complains of minimal SOB at rest


Denies any other symptoms


Much better today -but requiring more flow to maintain adequet saturation


Noted to have Hematuria this morning








OBJECTIVE:





Vital Signs-as noted below





Exam:


General-Minimal distress at rest


Eyes-normal


ENT-normal


Neck-supple


Lungs-Decreased breath sound bilaterally 


Minimal wheezing bilaterally 


Heart-Regular,no murmur 


Abdomen-Benign,no masses,bowel sound present 


Extremities-No edema 


Neuro-AAOx3





Lab data as noted below.


ASSESSMENT & PLAN:








Acute hypercapnic respiratory failure 


Secondary to COPD exacerbation and complicated by Pneumonia


CXR showed dense bibasilar airspace consolidation, right greater than left with 

associated pleural effusions.


ABG on admission showed respiratory acidosis with comp


Has been on  Levaquin and Zosyn


Negative influenza test 


Continue Solumedrol


Continue BIPAP with intermittent  high flow supplement oxygen 


Blood cx no growth


CT with PE protocol showed no evidence for PE. Dense bilateral lower lobe 

atelectasis/consolidation. Patchy airspace


opacities are also present within the right upper lobe lingula and right middle 

lobe.


Advised to USE BIPAP as much as she can to improve CO2 level and improve 

drowsiness


Lasix 40mg BID ot help fluid overload 


IV Vancomycin added to broaden the spectrum -discontinued


ECHO


* The echocardiogram is extremely technically limited.


* Patient was sitting upright for the test.


* There is no significant pericardial effusion noted.


* The left ventricular systolic function is grossly normal on limited 

visualization.


* The right ventricular systolic function is grossly normal.


* The valves are poorly visualized.


* There is no significant valvular stenosis or regurgitation on technically 

limited Doppler evaluation.


Continue current antibiotics for 3 more days 


Appreciate Pulmonary evaluation 


Will need extra care during Anesthesia








Paroxysmal atrial flutter with rapid ventricular response


Due to acute hypercapnic respiratory failure


Received Cardizem and digoxin


Continue on heparin drip till therapeutic with Coumadin 


Continue Cardizem 240mg


Rate is controlled and denies any symptoms





Hyponatremia


Possible related to hypovolemia  


Na on admission 128


Na today 134


Improved





Hypokalemia ,admitted with Hyperkalemia


Lisinopril was on hold and leter on developed Hypo\kalemia


Getting Supplement 


Lisinopril is restarted





Left Ankle fracture s/p fall 


Left ankle xray showed distal tibial and fibular fractures as above with mild 

lateral subluxation of the talus.


Ortho on board


Continue required high flow oxygen and BIPAP for acute hypercapnic respiratory 

failure 


Continue to postpone surgery until respiratory status improves


Likely to have Conservative management of the Ankle


Surgery will carry a higher risk 


The issue was discussed in detailed with the Daughter and the patient


WILL NEED SURGERY FOR THE ANKLE FRACTURE AS PER ORTHO 





Right pelvic ring fracture, 


Pelvis xray showed an age indeterminant right pubic ring fracture, possibly 

chronic.


Stable








Hypertension


BP elevated 


Continue lisinopril.  


will add amlodipine


PRN clonidine


Continue monitor BP








Ulcerative colitis


On Pentasa


Stable





Depression


On Celexa


Stable





DVT px 


On heparin drip





CODE STATUS DNR 





DISPOSITION


Continue monitor in tele


Consultants:


Pulmonary





Vital Signs:











  Date Time  Temp Pulse Resp B/P (MAP) Pulse Ox O2 Delivery O2 Flow Rate FiO2


 


1/10/18 15:33  91  198/74 (115)    


 


1/10/18 14:15  93 24  89 Nasal Cannula 50.0 80


 


1/10/18 12:00      BiPAP  


 


1/10/18 11:32 36.6 88 28 174/75 (108) 91 High Flow Oxygen  


 


1/10/18 08:02 36.4 81 18 146/66 (92) 94 BiPAP  


 


1/10/18 08:00      BiPAP  


 


1/10/18 07:44  93 21  92 BiPAP/CPAP  75


 


1/10/18 06:57  93   93   75


 


1/10/18 06:18    146/66 (92)    


 


1/10/18 04:00      CPAP  


 


1/10/18 03:37 36.6 98 25 177/76 (109) 95 CPAP  


 


1/10/18 02:05  82 18  93 BiPAP/CPAP  75


 


1/10/18 02:05  82   93   75


 


1/10/18 00:00      CPAP  


 


1/9/18 23:33 36.8 90 25 133/70 (91) 92 CPAP  


 


1/9/18 22:17  89   95   75


 


1/9/18 20:00      High Flow Oxygen  


 


1/9/18 19:30  94 24  92 Nasal Cannula 50.0 80


 


1/9/18 19:30 36.9 89 16 209/112 (144) 90 High Flow Oxygen  


 


1/9/18 16:00      High Flow Oxygen  








Lab Results:





Results Past 24 Hours








Test


  1/9/18


16:00 1/9/18


20:14 1/10/18


06:16 1/10/18


06:21 Range/Units


 


 


Bedside Glucose 317 289 275  70-90  mg/dl


 


Prothrombin Time


  


  


  


  13.6


  9.0-12.0


SECONDS


 


Prothromb Time International


Ratio 


  


  


  1.3


  0.9-1.1  


 


 


Activated Partial


Thromboplast Time 


  


  


  69.3


  21.0-31.0


SECONDS


 


Partial Thromboplastin Ratio    2.7  


 


Sodium Level    131 136-145  mmol/L


 


Potassium Level    3.2 3.5-5.1  mmol/L


 


Chloride Level    83   mmol/L


 


Carbon Dioxide Level    45 21-32  mmol/L


 


Anion Gap    3.0 3-11  mmol/L


 


Blood Urea Nitrogen    23 7-18  mg/dl


 


Creatinine


  


  


  


  0.74


  0.60-1.20


mg/dl


 


Est Creatinine Clear Calc


Drug Dose 


  


  


  72.1


   ml/min


 


 


Estimated GFR (


American) 


  


  


  87.4


   


 


 


Estimated GFR (Non-


American 


  


  


  75.4


   


 


 


BUN/Creatinine Ratio    31.6 10-20  


 


Random Glucose    230 70-99  mg/dl


 


Calcium Level    8.4 8.5-10.1  mg/dl


 


Test


  1/10/18


11:16 


  


  


  Range/Units


 


 


Bedside Glucose 224    70-90  mg/dl

## 2018-01-10 NOTE — PULMONOLOGY PROGRESS NOTE
Pulmonary Progress Note


Date of Service


Reji 10, 2018.





Attending


Dr. Benitez Maloney


This is an 82-year-old  female who suffered a fall and bilateral 

malleoli or fracture of the left leg. She is a lifelong smoker and continues to 

smoke one half pack per day. She has been continued on BiPAP as well as high 

flow O2 during the day to maintain saturations between 88 and 92%. She 

currently is on 80% FiO2 with high flow oxygen and 50% FiO2 with BiPAP. Patient 

is not making much progress as far as weaning of supplemental oxygen. She does 

look improved from my first impression on Monday. However, she still appears be 

quite ill and incapacitated. She has not been out of bed for over a week and 

because of nonweightbearing status has not been ambulated. She also has not 

been moved to bedside chair with overhead lift.





Patient denies any chest pain or tightness. She still does have significant 

shortness of breath even at rest with supplemental support. She states that 

pain is controlled or left leg. She has no new acute complaints.





Objective








Vital Signs - as noted below





Laboratory Data - as noted below





Physical Exam:


General - NAD. Pleasant


Eyes - No icterus, gaze conjugate


ENT - Mucosa moist, no lesions or candidiasis


Neck - Supple, No JVD


Lungs - No bronchospasm, rales, or rhonchi. However, breath sounds are 

significantly decreased


Heart - Regular, rate controlled


Abdomen - Soft, NT, ND, BS present 


Extremities - No edema, pedal pulses intact. Splint in place to left leg


Neuro - A&OX3











Labs: 


   ABG 1/8/2018 -  7.44/70/79/46/94.6% on 70% FiO2 BiPAP


   ABG 01/06/2018--7.41/63/71/39/92.1% on 70% FiO2 BiPAP


   ABG 01/05/2018--7.27/80/116/36/96.7% on high flow nasal cannula


   


Imaging reviewed. 








Assessment & Plan


ACUTE ON CHRONIC RESPIRATORY FAILURE


* Patient continues to require high levels of supplemental oxygen via high flow 

oxygen and BiPAP.


* Patient is tolerating BiPAP nightly now


* No sputum production but continues with persistent cough


* Afebrile stable leukocytosis most likely secondary to steroids


* Check repeat chest x-ray tomorrow morning


* Continue supportive care and encouraged BiPAP at night and at rest


* Complete course of Levaquin and Zosyn for a total of 10 days (today is day 8/

10)





BILATERAL LEFT MALLEOLUS FRACTURE


* Orthopedics has been consulted and feels that ORIF is the best option


* We have contacted anesthesia and are currently discussing options for surgery


* Conservative management is not an option per orthopedics secondary to non-

fusion of joint


* Continue discussion with patient and daughters regarding risk and benefit





PAROXYSMAL ATRIAL FLUTTER WITH RVR


* Suspected be secondary to respiratory status


* Continue heparin drip


* Continue Cardizem and digoxin


* Will need Coumadin therapy status post surgery





DVT PROPHYLAXIS


* Heparin drip


* Patient currently nonambulatory











Physician Supervision Note:





I was present with Arvin Bledsoe PA-C during the history and exam. I discussed 

the case with him and agree with the findings and plan as documented in the 

note.  Any exceptions or clarifications are listed here: 





Patient with acute on chronic respiratory failure, advanced COPD on home O2, non

-compliant with therapy and still smoking, s/p malleolar fracture. Has 

worsening respiratory failure now from COPD exacerbation, on BIPAP alternating 

with high-flow.


Continue treatment for COPD exacerbation, nocturnal BIPAP.


Not really improving on current therapy. 


Still not optimized for surgery and I doubt that she will be optimized in the 

near future 


Discussed with anesthesia, she would not tolerate spinal or local anesthesia, 

cannot lay flat for so long anyway, so she would require intubation. The 

problem here is that she most likely will require a tracheostomy with long term 

vent support, in which case I question the benefit of the surgery.


Essentially, with or without surgery she would be confined to bed/wheel-chair


If she makes a significant recovery from a pulmonary standpoint, will reconsider





Documented By:  Karl Marquis MD





Data


Medications:





Current Inpatient Medications








 Medications


  (Trade)  Dose


 Ordered  Sig/Donna


 Route  Start Time


 Stop Time Status Last Admin


Dose Admin


 


 Levalbuterol


  (Xopenex 1.25MG/


 3ML Neb)  1.25 mg  Q6R


 INH  1/1/18 22:30


 1/31/18 22:29  1/10/18 14:14


1.25 MG


 


 Levofloxacin


  (Consult)  1 ea  UD  PRN


 N/A  1/2/18 01:09


 1/11/18 14:00   


 


 


 Tramadol HCl


  (Ultram Tab)  50 mg  Q6H  PRN


 PO  1/1/18 21:30


 1/31/18 21:29  1/2/18 07:55


50 MG


 


 Clonidine HCl


  (Catapres Tab)  0.1 mg  Q6H  PRN


 PO  1/1/18 21:30


 1/31/18 21:29 Future Hold 1/3/18 16:21


0.1 MG


 


 Citalopram


 Hydrobromide


  (celeXA TAB)  40 mg  DAILY


 PO  1/2/18 09:00


 2/1/18 08:59  1/10/18 07:36


40 MG


 


 Latanoprost


  (Xalatan Oph


 Soln)  1 drops  HS


 OP  1/2/18 21:00


 2/1/18 20:59  1/9/18 20:19


1 DROPS


 


 Mesalamine


  (Pentasa


 Controlled Rel


 Cap)  1,000 mg  BID


 PO  1/2/18 09:00


 2/1/18 08:59  1/10/18 07:35


1,000 MG


 


 Mirtazapine


  (Remeron Tab)  15 mg  HS


 PO  1/2/18 21:00


 2/1/18 20:59  1/9/18 20:24


15 MG


 


 Pantoprazole


 Sodium


  (Protonix Tab)  40 mg  DAILY


 PO  1/2/18 09:00


 2/1/18 08:59  1/10/18 09:26


40 MG


 


 Senna/Docusate


 Sodium


  (Senokot S Tab)  1 tab  DAILY


 PO  1/2/18 09:00


 2/1/18 08:59  1/8/18 08:17


1 TAB


 


 Simvastatin


  (Zocor Tab)  20 mg  HS


 PO  1/2/18 21:00


 2/1/18 20:59  1/9/18 20:24


20 MG


 


 Acetaminophen


  (Tylenol Tab)  650 mg  Q4H  PRN


 PO  1/1/18 21:45


 1/31/18 21:44  1/3/18 16:22


650 MG


 


 Ondansetron HCl


  (Zofran Inj)  4 mg  Q6H  PRN


 IV  1/1/18 21:45


 1/31/18 21:44   


 


 


 Piperacillin Sod/


 Tazobactam Sod


 4.5 gm/Dextrose  120 ml @ 


 30 mls/hr  Q8H


 IV  1/2/18 02:00


 1/11/18 19:00  1/10/18 10:22


30 MLS/HR


 


 Levofloxacin 750


 mg/Prmx  150 ml @ 


 100 mls/hr  Q24H


 IV  1/2/18 06:00


 1/11/18 08:00  1/10/18 06:19


100 MLS/HR


 


 Piperacillin Sod/


 Tazobactam Sod


  (Consult)  1 ea  UD  PRN


 N/A  1/2/18 01:15


 1/11/18 17:00   


 


 


 Heparin Sodium/


 Dextrose  500 ml @ 


 25 mls/hr  Q20H PRN


 IV  1/6/18 08:30


 2/5/18 08:29 Future Hold 1/9/18 18:22


25 MLS/HR


 


 Methylprednisolone


 Sodium Succinate


 40 mg/Syringe  0.64 ml @ 


 1.5 mls/min  Q12H


 IV  1/6/18 20:00


 2/5/18 19:59  1/10/18 07:33


1.5 MLS/MIN


 


 Acetylcysteine


  (Mucomyst 20%


 Inh Soln)  3 ml  BIDR


 INH  1/6/18 20:00


 2/5/18 19:59  1/10/18 06:55


3 ML


 


 Diltiazem HCl


  (Cardizem Cd Cap)  240 mg  QAM


 PO  1/7/18 09:00


 2/5/18 08:59  1/10/18 07:34


240 MG


 


 Nystatin


  (Mycostatin Susp)  5 ml  QID


 PO  1/8/18 13:00


 1/11/18 12:59  1/10/18 12:36


5 ML


 


 Insulin Aspart


  (novoLOG ASPART)  **SLIDING


 SCALE**


 **G...  ACHS


 SC  1/9/18 21:00


 2/8/18 20:59  1/10/18 12:36


5 UNITS


 


 Glucose


  (Glucose 40% Gel)  15-30


 GRAMS 15


 GRAMS...  UD  PRN


 PO  1/9/18 17:15


 2/8/18 17:14   


 


 


 Glucose


  (Glucose Chew


 Tab)  4-8


 Tablets 4


 Tabl...  UD  PRN


 PO  1/9/18 17:15


 2/8/18 17:14   


 


 


 Dextrose


  (Dextrose 50%


 50ML Syringe)  25-50ML OF


 50% DW IV


 FOR...  UD  PRN


 IV  1/9/18 17:15


 2/8/18 17:14   


 


 


 Glucagon


  (Glucagon Inj)  1 mg  UD  PRN


 SQ  1/9/18 17:15


 2/8/18 17:14   


 


 


 Hydralazine HCl


  (HydrALAZINE INJ)  10 mg  Q6H  PRN


 IV.  1/9/18 20:00


 2/8/18 19:59  1/10/18 15:32


10 MG


 


 Warfarin Sodium


  (Coumadin Tab)  5 mg  DAILY@16


 PO  1/10/18 16:00


 2/9/18 15:59   


 


 


 Lisinopril


  (Zestril Tab)  10 mg  BID


 PO  1/10/18 21:00


 2/9/18 08:59   


 


 


 Furosemide


  (Lasix Tab)  20 mg  QAM


 PO  1/11/18 09:00


 2/10/18 08:59   


 








I & O:











24-Hour Column 


 


 1/11/18





 08:00


 


Intake Total 1332 ml


 


Output Total 1300 ml


 


Balance 32 ml








Vital Signs:











  Date Time  Temp Pulse Resp B/P (MAP) Pulse Ox O2 Delivery O2 Flow Rate FiO2


 


1/10/18 16:00      BiPAP  


 


1/10/18 15:33  91  198/74 (115)    


 


1/10/18 15:15 36.4 80 20  96 High Flow Oxygen  


 


1/10/18 14:15  93 24  89 Nasal Cannula 50.0 80


 


1/10/18 12:00      BiPAP  


 


1/10/18 11:32 36.6 88 28 174/75 (108) 91 High Flow Oxygen  


 


1/10/18 08:02 36.4 81 18 146/66 (92) 94 BiPAP  


 


1/10/18 08:00      BiPAP  


 


1/10/18 07:44  93 21  92 BiPAP/CPAP  75


 


1/10/18 06:57  93   93   75


 


1/10/18 06:18    146/66 (92)    


 


1/10/18 04:00      CPAP  


 


1/10/18 03:37 36.6 98 25 177/76 (109) 95 CPAP  


 


1/10/18 02:05  82 18  93 BiPAP/CPAP  75


 


1/10/18 02:05  82   93   75


 


1/10/18 00:00      CPAP  


 


1/9/18 23:33 36.8 90 25 133/70 (91) 92 CPAP  


 


1/9/18 22:17  89   95   75


 


1/9/18 20:00      High Flow Oxygen  


 


1/9/18 19:30  94 24  92 Nasal Cannula 50.0 80


 


1/9/18 19:30 36.9 89 16 209/112 (144) 90 High Flow Oxygen  








Laboratory Results:





Last 24 Hours








Test


  1/9/18


20:14 1/10/18


06:16 1/10/18


06:21 1/10/18


11:16


 


Bedside Glucose 289 mg/dl  275 mg/dl   224 mg/dl 


 


Prothrombin Time   13.6 SECONDS  


 


Prothromb Time International


Ratio 


  


  1.3 


  


 


 


Activated Partial


Thromboplast Time 


  


  69.3 SECONDS 


  


 


 


Partial Thromboplastin Ratio   2.7  


 


Sodium Level   131 mmol/L  


 


Potassium Level   3.2 mmol/L  


 


Chloride Level   83 mmol/L  


 


Carbon Dioxide Level   45 mmol/L  


 


Anion Gap   3.0 mmol/L  


 


Blood Urea Nitrogen   23 mg/dl  


 


Creatinine   0.74 mg/dl  


 


Est Creatinine Clear Calc


Drug Dose 


  


  72.1 ml/min 


  


 


 


Estimated GFR (


American) 


  


  87.4 


  


 


 


Estimated GFR (Non-


American 


  


  75.4 


  


 


 


BUN/Creatinine Ratio   31.6  


 


Random Glucose   230 mg/dl  


 


Calcium Level   8.4 mg/dl  


 


Test


  1/10/18


16:00 


  


  


 


 


Bedside Glucose 215 mg/dl

## 2018-01-10 NOTE — ANESTHESIOLOGY PROGRESS NOTE
Anesthesia Progress Note


Date of Service


Reji 10, 2018.





Progress Notes


Asked to add input to decision regarding further care of her broken ankle.  

Patient currently requiring high flow oxygen with bipap to maintain her 

oxygenation and ventilation at a reasonable level.  She is not able to tolerate 

lying supine even with this.  She is anticoagulated due to A Fib.  At this 

point I do not see any feasible option for anesthesia other than to intubate 

and mechanically ventilate her.  Even if her anticoagulation was reversed and a 

spinal was possible, she would not tolerate breathing on her own like that for 

hours.  Postoperatively it is very unlikely she would be able to be extubated 

right away considering the supportive care she is already requiring.

## 2018-01-11 VITALS
SYSTOLIC BLOOD PRESSURE: 164 MMHG | TEMPERATURE: 98.6 F | DIASTOLIC BLOOD PRESSURE: 73 MMHG | HEART RATE: 80 BPM | OXYGEN SATURATION: 89 %

## 2018-01-11 VITALS — HEART RATE: 79 BPM | OXYGEN SATURATION: 93 %

## 2018-01-11 VITALS — HEART RATE: 71 BPM | OXYGEN SATURATION: 91 %

## 2018-01-11 VITALS — HEART RATE: 72 BPM | OXYGEN SATURATION: 92 %

## 2018-01-11 VITALS — HEART RATE: 74 BPM | OXYGEN SATURATION: 92 %

## 2018-01-11 VITALS
OXYGEN SATURATION: 94 % | HEART RATE: 82 BPM | DIASTOLIC BLOOD PRESSURE: 59 MMHG | SYSTOLIC BLOOD PRESSURE: 149 MMHG | TEMPERATURE: 97.7 F

## 2018-01-11 VITALS — OXYGEN SATURATION: 93 % | HEART RATE: 89 BPM

## 2018-01-11 VITALS
DIASTOLIC BLOOD PRESSURE: 67 MMHG | OXYGEN SATURATION: 93 % | TEMPERATURE: 99.32 F | HEART RATE: 85 BPM | SYSTOLIC BLOOD PRESSURE: 156 MMHG

## 2018-01-11 VITALS
SYSTOLIC BLOOD PRESSURE: 135 MMHG | DIASTOLIC BLOOD PRESSURE: 85 MMHG | OXYGEN SATURATION: 94 % | TEMPERATURE: 97.7 F | HEART RATE: 78 BPM

## 2018-01-11 VITALS — DIASTOLIC BLOOD PRESSURE: 93 MMHG | SYSTOLIC BLOOD PRESSURE: 176 MMHG

## 2018-01-11 VITALS
DIASTOLIC BLOOD PRESSURE: 93 MMHG | TEMPERATURE: 99.32 F | SYSTOLIC BLOOD PRESSURE: 202 MMHG | OXYGEN SATURATION: 90 % | HEART RATE: 81 BPM

## 2018-01-11 VITALS — SYSTOLIC BLOOD PRESSURE: 148 MMHG | DIASTOLIC BLOOD PRESSURE: 65 MMHG | TEMPERATURE: 98.96 F | HEART RATE: 81 BPM

## 2018-01-11 VITALS — SYSTOLIC BLOOD PRESSURE: 172 MMHG | DIASTOLIC BLOOD PRESSURE: 79 MMHG

## 2018-01-11 VITALS — OXYGEN SATURATION: 93 % | HEART RATE: 79 BPM

## 2018-01-11 VITALS — OXYGEN SATURATION: 92 %

## 2018-01-11 LAB
BUN SERPL-MCNC: 22 MG/DL (ref 7–18)
CALCIUM SERPL-MCNC: 8.3 MG/DL (ref 8.5–10.1)
CO2 SERPL-SCNC: 44 MMOL/L (ref 21–32)
CREAT SERPL-MCNC: 0.59 MG/DL (ref 0.6–1.2)
GLUCOSE SERPL-MCNC: 178 MG/DL (ref 70–99)
INR PPP: 1.5 (ref 0.9–1.1)
PHOSPHATE SERPL-MCNC: 3.2 MG/DL (ref 2.5–4.9)
POTASSIUM SERPL-SCNC: 3.1 MMOL/L (ref 3.5–5.1)
SODIUM SERPL-SCNC: 135 MMOL/L (ref 136–145)

## 2018-01-11 RX ADMIN — LISINOPRIL SCH MG: 10 TABLET ORAL at 20:28

## 2018-01-11 RX ADMIN — PIPERACILLIN SODIUM, TAZOBACTAM SODIUM SCH MLS/HR: 4; .5 INJECTION, POWDER, LYOPHILIZED, FOR SOLUTION INTRAVENOUS at 10:00

## 2018-01-11 RX ADMIN — LEVALBUTEROL HYDROCHLORIDE SCH MG: 1.25 SOLUTION RESPIRATORY (INHALATION) at 07:16

## 2018-01-11 RX ADMIN — LATANOPROST SCH DROPS: 50 SOLUTION/ DROPS OPHTHALMIC at 20:29

## 2018-01-11 RX ADMIN — DILTIAZEM HYDROCHLORIDE SCH MG: 240 CAPSULE, EXTENDED RELEASE ORAL at 07:47

## 2018-01-11 RX ADMIN — FUROSEMIDE SCH MG: 20 TABLET ORAL at 07:48

## 2018-01-11 RX ADMIN — INSULIN ASPART SCH UNITS: 100 INJECTION, SOLUTION INTRAVENOUS; SUBCUTANEOUS at 11:55

## 2018-01-11 RX ADMIN — METHYLPREDNISOLONE SODIUM SUCCINATE SCH MLS/MIN: 1 INJECTION, POWDER, FOR SOLUTION INTRAMUSCULAR; INTRAVENOUS at 07:46

## 2018-01-11 RX ADMIN — TRAMADOL HYDROCHLORIDE PRN MG: 50 TABLET, COATED ORAL at 09:02

## 2018-01-11 RX ADMIN — CITALOPRAM HYDROBROMIDE SCH MG: 40 TABLET ORAL at 07:48

## 2018-01-11 RX ADMIN — PIPERACILLIN SODIUM, TAZOBACTAM SODIUM SCH MLS/HR: 4; .5 INJECTION, POWDER, LYOPHILIZED, FOR SOLUTION INTRAVENOUS at 18:22

## 2018-01-11 RX ADMIN — MESALAMINE SCH MG: 250 CAPSULE ORAL at 07:48

## 2018-01-11 RX ADMIN — LEVALBUTEROL HYDROCHLORIDE SCH MG: 1.25 SOLUTION RESPIRATORY (INHALATION) at 01:42

## 2018-01-11 RX ADMIN — MESALAMINE SCH MG: 250 CAPSULE ORAL at 20:27

## 2018-01-11 RX ADMIN — STANDARDIZED SENNA CONCENTRATE AND DOCUSATE SODIUM SCH TAB: 8.6; 5 TABLET ORAL at 07:49

## 2018-01-11 RX ADMIN — INSULIN ASPART SCH UNITS: 100 INJECTION, SOLUTION INTRAVENOUS; SUBCUTANEOUS at 16:49

## 2018-01-11 RX ADMIN — INSULIN ASPART SCH UNITS: 100 INJECTION, SOLUTION INTRAVENOUS; SUBCUTANEOUS at 20:43

## 2018-01-11 RX ADMIN — MORPHINE SULFATE PRN MG: 2 INJECTION, SOLUTION INTRAMUSCULAR; INTRAVENOUS at 11:02

## 2018-01-11 RX ADMIN — LISINOPRIL SCH MG: 10 TABLET ORAL at 07:49

## 2018-01-11 RX ADMIN — LEVALBUTEROL HYDROCHLORIDE SCH MG: 1.25 SOLUTION RESPIRATORY (INHALATION) at 14:02

## 2018-01-11 RX ADMIN — SIMVASTATIN SCH MG: 20 TABLET, FILM COATED ORAL at 20:28

## 2018-01-11 RX ADMIN — INSULIN ASPART SCH UNITS: 100 INJECTION, SOLUTION INTRAVENOUS; SUBCUTANEOUS at 07:52

## 2018-01-11 RX ADMIN — MIRTAZAPINE SCH MG: 15 TABLET, FILM COATED ORAL at 20:27

## 2018-01-11 RX ADMIN — NYSTATIN SCH ML: 100000 SUSPENSION ORAL at 07:48

## 2018-01-11 RX ADMIN — WARFARIN SODIUM SCH MG: 5 TABLET ORAL at 16:48

## 2018-01-11 RX ADMIN — METHYLPREDNISOLONE SODIUM SUCCINATE SCH MLS/MIN: 1 INJECTION, POWDER, FOR SOLUTION INTRAMUSCULAR; INTRAVENOUS at 20:26

## 2018-01-11 RX ADMIN — LEVOFLOXACIN SCH MLS/HR: 5 INJECTION, SOLUTION INTRAVENOUS at 06:08

## 2018-01-11 RX ADMIN — ACETYLCYSTEINE SCH ML: 200 SOLUTION ORAL; RESPIRATORY (INHALATION) at 11:17

## 2018-01-11 RX ADMIN — MORPHINE SULFATE PRN MG: 2 INJECTION, SOLUTION INTRAMUSCULAR; INTRAVENOUS at 20:21

## 2018-01-11 RX ADMIN — ACETYLCYSTEINE SCH ML: 200 SOLUTION ORAL; RESPIRATORY (INHALATION) at 19:06

## 2018-01-11 RX ADMIN — LEVALBUTEROL HYDROCHLORIDE SCH MG: 1.25 SOLUTION RESPIRATORY (INHALATION) at 19:06

## 2018-01-11 RX ADMIN — PIPERACILLIN SODIUM, TAZOBACTAM SODIUM SCH MLS/HR: 4; .5 INJECTION, POWDER, LYOPHILIZED, FOR SOLUTION INTRAVENOUS at 02:31

## 2018-01-11 RX ADMIN — PANTOPRAZOLE SCH MG: 40 TABLET, DELAYED RELEASE ORAL at 07:47

## 2018-01-11 NOTE — CARDIOLOGY FOLLOW-UP
Subjective


General


Date of Service:


Jan 11, 2018.


Pt evaluation today including:  conversation w/ patient, physical exam, chart 

review, lab review, review of studies, review of inpatient medication list





History of Present Illness


The patient is a 82 year old female seen in follow-up.


More alert today.


Respiratory status improved.


Currently sinus rhythm with PACs.


Intermittent atrial fib/flutter noted.


Heart rates are controlled.


Hypokalemia noted on lab studies this a.m.





Allergies


Coded Allergies:  


     Sulfa Drugs (Verified  Allergy, Unknown, `, 5/18/14)


     Sulfamethoxazole (Verified  Allergy, Unknown, `, 5/18/14)


     Trimethoprim (Verified  Allergy, Unknown, `, 5/18/14)





Social History


Smoking Status:  Current Some Day Smoker


Hx Tobacco Use In Past Year?:  Yes


Hx Alcohol Use - Type And Amou:  No


Hx Substance Use - Type And Am:  No





Problem List


Medical Problems:


(1) Acute on chronic respiratory failure with hypoxia and hypercapnia


Status: Acute  





(2) Ankle fracture, left


Status: Acute  





(3) Bimalleolar ankle fracture


Status: Acute  





(4) Hyponatremia


Status: Acute  





(5) Pneumonia


Status: Acute  











Review of Systems


Respiratory:  + cough, + shortness of breath, + dyspnea on exertion, No sputum, 

No wheezing, No dyspnea at rest, No hemoptysis


Cardiac:  No chest pain, No orthopnea, No PND, No edema, No claudication, No 

palpitations





Physical Exam


Vital Signs





Last Vital Signs Documentation








  Date Time  Temp Pulse Resp B/P (MAP) Pulse Ox O2 Delivery O2 Flow Rate FiO2


 


1/11/18 12:00      High Flow Oxygen  


 


1/11/18 11:53 37.4 85 28 156/67 (96) 93   50


 


1/10/18 19:30       50.0 











Physical Exam


Constitutional:  


   Level of Distress:  NAD, chronically ill


Head:  normocephalic


Neck:  supple, trachea midline


Lungs:  


   Auscultation:  no wheezing, no rales/crackles, no rhonchi, decreased breath 

sounds


Cardiovascular:  


   Heart Auscultation:  normal S1, normal S2, no murmurs, irregular rate rhythm


Peripheral Pulses:  


   Radial Pulse:  normal on the left, normal on the right


Abdomen:  


   Inspection & Palpation:  soft, non-distended, no tenderness, guarding & 

rebound, no masses


Extremities:  no cyanosis, no clubbing, no ulcers, edema (trace bilateral 

pretibial edema.)


Neurologic:  


   Gait & Station:  pertinent finding (no focal motor deficit)


   Cranial Nerves:  grossly intact





Assessment and Plan


Assessment and Plan


FINAL IMPRESSION:


1.  Paroxysmal atrial fib/flutter with rapid ventricular response secondary to 

underlying respiratory insufficiency, hypercapnic respiratory failure, chronic 

obstructive pulmonary disease exacerbation, and pneumonia.


     -Rhythm predominantly sinus with PACs.  Intermittent atrial fibrillation/

flutter noted however heart rate remains controlled.


2.  Acute decompensated diastolic heart failure exacerbated by rapid atrial 

flutter as well as positive fluid balance since admission.


     -Patient appears compensated


3.  Hypertension --improved with daily diuretic therapy and titration of 

lisinopril


4.  Preserved LV and RV function.


5.  Hypokalemia 


6.  Hyponatremia.


7.  Left ankle fracture, status post full.


8.  Ulcerative colitis.


9.  Carotid vascular disease with history of prior endarterectomy


 


PLAN AND RECOMMENDATIONS: 


Replace potassium. 


Continue Cardizem CD 240mg daily, daily oral Lasix, and lisinopril 10 mg twice 

daily..


Continue intravenous heparin as bridge to Coumadin for goal INR of 2.0-3.0.


Repeat INR in AM.


Follow fluid balance, GFR, electrolytes, respiratory status closely.


Consider re-consult pulmonary medicine for evaluation of persistent hypercapnia.





Laboratory Results





Last 24 Hours








Test


  1/10/18


16:00 1/10/18


20:13 1/11/18


05:59 1/11/18


06:28


 


Bedside Glucose 215 mg/dl  215 mg/dl   216 mg/dl 


 


Prothrombin Time   15.5 SECONDS  


 


Prothromb Time International


Ratio 


  


  1.5 


  


 


 


Sodium Level   135 mmol/L  


 


Potassium Level   3.1 mmol/L  


 


Chloride Level   87 mmol/L  


 


Carbon Dioxide Level   44 mmol/L  


 


Anion Gap   5.0 mmol/L  


 


Blood Urea Nitrogen   22 mg/dl  


 


Creatinine   0.59 mg/dl  


 


Est Creatinine Clear Calc


Drug Dose 


  


  89.7 ml/min 


  


 


 


Estimated GFR (


American) 


  


  98.9 


  


 


 


Estimated GFR (Non-


American 


  


  85.4 


  


 


 


BUN/Creatinine Ratio   37.9  


 


Random Glucose   178 mg/dl  


 


Calcium Level   8.3 mg/dl  


 


Phosphorus Level   3.2 mg/dl  


 


Magnesium Level   2.3 mg/dl  


 


Test


  1/11/18


11:23 


  


  


 


 


Bedside Glucose 185 mg/dl

## 2018-01-11 NOTE — PULMONOLOGY PROGRESS NOTE
Pulmonary Progress Note


Date of Service


Jan 11, 2018.





Attending


Dr. Marquis





Subjective


Patient had difficulty sleeping last night and did not tolerate BiPAP very 

well. Increased pain in her left ankle/leg. No fever or chills. Unable to lay 

flat. No new complaints





Objective








Vital Signs - as noted below





Laboratory Data - as noted below





Physical Exam:


General - NAD. Pleasant


Eyes - No icterus, gaze conjugate


ENT - Mucosa moist, no lesions or candidiasis


Neck - Supple, No JVD


Lungs - No bronchospasm, rales, or rhonchi. However, breath sounds are 

significantly decreased


Heart - Regular, rate controlled


Abdomen - Soft, NT, ND, BS present 


Extremities - No edema, pedal pulses intact. Splint in place to left leg


Neuro - A&OX3











Labs: 


   ABG 1/8/2018 -  7.44/70/79/46/94.6% on 70% FiO2 BiPAP


   ABG 01/06/2018--7.41/63/71/39/92.1% on 70% FiO2 BiPAP


   ABG 01/05/2018--7.27/80/116/36/96.7% on high flow nasal cannula


   


Imaging reviewed. 








Assessment & Plan


Lengthy discussion with patient, daughter Kirti Miller, OmayraCALOS Mo 

from palliative care. Discussed case with anesthesia yesterday and they agree 

that patient would require endotracheal intubation with mechanical ventilation 

for any repair. Discussed concern with patient and family that we are not 

confident that we would be able to successfully extubate and that patient would 

require tracheostomy tube and peg tube. Patient noted that she would not want 

that and that she would prefer to continue with DNR/DNI and focus on comfort. I 

will contact orthopedics and look at options for immobilization with casting, 

orthotics, or splinting. We will also begin to titrate off of [place] O2 to an 

Oxymask. Our goal will be to maintain SaO2 above 85% and get  home on 

hospice.   








ACUTE ON CHRONIC RESPIRATORY FAILURE


* Patient continues to require high levels of supplemental oxygen via high flow 

oxygen and BiPAP.


* Patient is tolerating BiPAP nightly now but begrudgingly


* No sputum production but continues with persistent cough


* Afebrile stable leukocytosis most likely secondary to steroids


* Continue supportive care and encouraged BiPAP at night and at rest


* Complete course of Levaquin and Zosyn for a total of 10 days (today is day 9/

10)





BILATERAL LEFT MALLEOLUS FRACTURE


* Orthopedics has been consulted and feels that ORIF is the best option


* Continue discussion with ortho regarding non-surgical options to best 

immobilize ankle for palliation





PAROXYSMAL ATRIAL FLUTTER WITH RVR


* Suspected to be secondary to respiratory status


* Continue heparin drip


* Continue Cardizem and digoxin


* Would resume coumadin 





DVT PROPHYLAXIS


* Heparin drip


* Patient currently nonambulatory





DISPOSITION:


* Will place consult for palliative care. Goal is to provide comfort and get 

patient home on hospice for end of life care








Thank you for including us in the care of this patient. We will continue to 

follow along as we transition to palliative care. Please refer to Dr. Marquis'

s addendum for further recommendations





Physician Supervision Note:





I was present with Arvin Bledsoe PA-C during the history and exam. I discussed 

the case with him and agree with the findings and plan as documented in the 

note.  Any exceptions or clarifications are listed here: 





Patient with acute on chronic respiratory failure, advanced COPD on home O2, non

-compliant with therapy and still smoking, s/p malleolar fracture. Has 

worsening respiratory failure now from COPD exacerbation, on BIPAP alternating 

with high-flow nasal cannula.


Continue treatment for COPD exacerbation, nocturnal BIPAP.


Not really improving on current therapy. 


Still not optimized for surgery and I doubt that she will be optimized in the 

near future 


Patient is choosing to be DNR/DNI and continue with comfort care instead of 

surgical approach, given the risks associated with anesthesia


Palliative care





Documented By:  Karl Marquis MD





Data


Medications:





Current Inpatient Medications








 Medications


  (Trade)  Dose


 Ordered  Sig/Donna


 Route  Start Time


 Stop Time Status Last Admin


Dose Admin


 


 Levalbuterol


  (Xopenex 1.25MG/


 3ML Neb)  1.25 mg  Q6R


 INH  1/1/18 22:30


 1/31/18 22:29  1/11/18 07:16


1.25 MG


 


 Levofloxacin


  (Consult)  1 ea  UD  PRN


 N/A  1/2/18 01:09


 1/11/18 14:00   


 


 


 Tramadol HCl


  (Ultram Tab)  50 mg  Q6H  PRN


 PO  1/1/18 21:30


 1/31/18 21:29  1/11/18 09:02


50 MG


 


 Clonidine HCl


  (Catapres Tab)  0.1 mg  Q6H  PRN


 PO  1/1/18 21:30


 1/31/18 21:29 Future Hold 1/3/18 16:21


0.1 MG


 


 Citalopram


 Hydrobromide


  (celeXA TAB)  40 mg  DAILY


 PO  1/2/18 09:00


 2/1/18 08:59  1/11/18 07:48


40 MG


 


 Latanoprost


  (Xalatan Oph


 Soln)  1 drops  HS


 OP  1/2/18 21:00


 2/1/18 20:59  1/10/18 20:25


1 DROPS


 


 Mesalamine


  (Pentasa


 Controlled Rel


 Cap)  1,000 mg  BID


 PO  1/2/18 09:00


 2/1/18 08:59  1/11/18 07:48


1,000 MG


 


 Mirtazapine


  (Remeron Tab)  15 mg  HS


 PO  1/2/18 21:00


 2/1/18 20:59  1/10/18 20:28


15 MG


 


 Pantoprazole


 Sodium


  (Protonix Tab)  40 mg  DAILY


 PO  1/2/18 09:00


 2/1/18 08:59  1/11/18 07:47


40 MG


 


 Senna/Docusate


 Sodium


  (Senokot S Tab)  1 tab  DAILY


 PO  1/2/18 09:00


 2/1/18 08:59  1/8/18 08:17


1 TAB


 


 Simvastatin


  (Zocor Tab)  20 mg  HS


 PO  1/2/18 21:00


 2/1/18 20:59  1/10/18 20:27


20 MG


 


 Acetaminophen


  (Tylenol Tab)  650 mg  Q4H  PRN


 PO  1/1/18 21:45


 1/31/18 21:44  1/10/18 17:23


650 MG


 


 Ondansetron HCl


  (Zofran Inj)  4 mg  Q6H  PRN


 IV  1/1/18 21:45


 1/31/18 21:44   


 


 


 Piperacillin Sod/


 Tazobactam Sod


 4.5 gm/Dextrose  120 ml @ 


 30 mls/hr  Q8H


 IV  1/2/18 02:00


 1/11/18 19:00  1/11/18 02:31


30 MLS/HR


 


 Piperacillin Sod/


 Tazobactam Sod


  (Consult)  1 ea  UD  PRN


 N/A  1/2/18 01:15


 1/11/18 17:00   


 


 


 Heparin Sodium/


 Dextrose  500 ml @ 


 25 mls/hr  Q20H PRN


 IV  1/6/18 08:30


 2/5/18 08:29 Future Hold 1/9/18 18:22


25 MLS/HR


 


 Methylprednisolone


 Sodium Succinate


 40 mg/Syringe  0.64 ml @ 


 1.5 mls/min  Q12H


 IV  1/6/18 20:00


 2/5/18 19:59  1/11/18 07:46


1.5 MLS/MIN


 


 Acetylcysteine


  (Mucomyst 20%


 Inh Soln)  3 ml  BIDR


 INH  1/6/18 20:00


 2/5/18 19:59  1/10/18 19:29


3 ML


 


 Diltiazem HCl


  (Cardizem Cd Cap)  240 mg  QAM


 PO  1/7/18 09:00


 2/5/18 08:59  1/11/18 07:47


240 MG


 


 Nystatin


  (Mycostatin Susp)  5 ml  QID


 PO  1/8/18 13:00


 1/11/18 12:59  1/11/18 07:48


5 ML


 


 Insulin Aspart


  (novoLOG ASPART)  **SLIDING


 SCALE**


 **G...  ACHS


 SC  1/9/18 21:00


 2/8/18 20:59  1/11/18 07:52


5 UNITS


 


 Glucose


  (Glucose 40% Gel)  15-30


 GRAMS 15


 GRAMS...  UD  PRN


 PO  1/9/18 17:15


 2/8/18 17:14   


 


 


 Glucose


  (Glucose Chew


 Tab)  4-8


 Tablets 4


 Tabl...  UD  PRN


 PO  1/9/18 17:15


 2/8/18 17:14   


 


 


 Dextrose


  (Dextrose 50%


 50ML Syringe)  25-50ML OF


 50% DW IV


 FOR...  UD  PRN


 IV  1/9/18 17:15


 2/8/18 17:14   


 


 


 Glucagon


  (Glucagon Inj)  1 mg  UD  PRN


 SQ  1/9/18 17:15


 2/8/18 17:14   


 


 


 Hydralazine HCl


  (HydrALAZINE INJ)  10 mg  Q6H  PRN


 IV.  1/9/18 20:00


 2/8/18 19:59  1/10/18 15:32


10 MG


 


 Warfarin Sodium


  (Coumadin Tab)  5 mg  DAILY@16


 PO  1/10/18 16:00


 2/9/18 15:59  1/10/18 16:35


5 MG


 


 Lisinopril


  (Zestril Tab)  10 mg  BID


 PO  1/10/18 21:00


 2/9/18 08:59  1/11/18 07:49


10 MG


 


 Furosemide


  (Lasix Tab)  20 mg  QAM


 PO  1/11/18 09:00


 2/10/18 08:59  1/11/18 07:48


20 MG








Vital Signs:











  Date Time  Temp Pulse Resp B/P (MAP) Pulse Ox O2 Delivery O2 Flow Rate FiO2


 


1/11/18 08:06 36.5 78 17 135/85 (102) 94 BiPAP  75


 


1/11/18 08:00      BiPAP  


 


1/11/18 07:18  79   93   75


 


1/11/18 07:17  79 17  93 BiPAP/CPAP  75


 


1/11/18 04:00      BiPAP  


 


1/11/18 03:33 36.5 82 33 149/59 (89) 94 CPAP  


 


1/11/18 01:43  74   92   75


 


1/11/18 01:42  74 30  92 BiPAP/CPAP  75


 


1/11/18 01:07    172/79 (110)    


 


1/11/18 00:00      BiPAP  


 


1/10/18 23:35 36.5 82 28 190/92 (124) 93 CPAP  


 


1/10/18 22:33  84   92   75


 


1/10/18 20:00      BiPAP  


 


1/10/18 19:30  82 20  92 Nasal Cannula 50.0 75


 


1/10/18 18:53 36.4 92 27 183/85 (117) 92 High Flow Oxygen  


 


1/10/18 16:50  88  181/73 (109)    


 


1/10/18 16:00      BiPAP  


 


1/10/18 15:33  91  198/74 (115)    


 


1/10/18 15:15 36.4 80 20  96 High Flow Oxygen  


 


1/10/18 14:15  93 24  89 Nasal Cannula 50.0 80


 


1/10/18 12:00      BiPAP  


 


1/10/18 11:32 36.6 88 28 174/75 (108) 91 High Flow Oxygen  








Laboratory Results:





Last 24 Hours








Test


  1/10/18


11:16 1/10/18


16:00 1/10/18


20:13 1/11/18


05:59


 


Bedside Glucose 224 mg/dl  215 mg/dl  215 mg/dl  


 


Prothrombin Time    15.5 SECONDS 


 


Prothromb Time International


Ratio 


  


  


  1.5 


 


 


Sodium Level    135 mmol/L 


 


Potassium Level    3.1 mmol/L 


 


Chloride Level    87 mmol/L 


 


Carbon Dioxide Level    44 mmol/L 


 


Anion Gap    5.0 mmol/L 


 


Blood Urea Nitrogen    22 mg/dl 


 


Creatinine    0.59 mg/dl 


 


Est Creatinine Clear Calc


Drug Dose 


  


  


  89.7 ml/min 


 


 


Estimated GFR (


American) 


  


  


  98.9 


 


 


Estimated GFR (Non-


American 


  


  


  85.4 


 


 


BUN/Creatinine Ratio    37.9 


 


Random Glucose    178 mg/dl 


 


Calcium Level    8.3 mg/dl 


 


Phosphorus Level    3.2 mg/dl 


 


Magnesium Level    2.3 mg/dl 


 


Test


  1/11/18


06:28 


  


  


 


 


Bedside Glucose 216 mg/dl

## 2018-01-11 NOTE — PALLIATIVE CARE CONSULTATION
Consultation


Date of Consultation:


Jan 11, 2018.


Requesting Physician:


Arvin Bledsoe PA-C


Attending Physician:


Dr. Estevez


Reason for Consultation:


Goals of care


History of Present Illness


This 82 year old female patient with PMH COPD on home O2, htn, UC, and others 

listed below, presented to the hospital 10 days ago with c/o SOB. Patient found 

to have pneumonia and was requiring continuous bipap to maintain saturations. 

Patient also suffered a fall at home and subsequent left distal tibial and 

fibular fractures with mild lateral subluxation of the talus as noted in x-ray 

report. She has been admitted for 10 days to the progressive care unit with 

COPD exacerbation and pneumonia as well as the ankle fracture. Ortho has been 

following and has been unable to perform corrective surgery to the left ankle 

due to her poor respiratory status. She is now requiring high-flow nasal 

cannula during the day and Bipap at night. Patient would possibly not be able 

to be liberated from the ventilator safely after surgery. The time has come for 

patient to make a decision of whether or not to proceed with surgery and the 

possibility of needing a trach/PEG tube vs. focusing on comfort/palliation. 

Palliative care is consulted.





I met in room 203 with Arvin Bledsoe PA-C, patient and her daughter, Kirti Miller, 

for family meeting. Rakan explained in detail to the patient and daughter that 

any surgical intervention would require intubation and mechanical ventilation; 

and that the concern is patient would not easily be liberated from the 

ventilator and could end up with trach/PEG. Patient stated she would NOT want 

those things and prefers to focus on comfort/palliation with a goal of 

eventually getting home with hospice. We also discussed the fact that we are 

unsure of her ability to get home as she would not be able to have high-flow 

nasal cannula at home and would still requiring bipap at night. The plan will 

be for medical management to get oxygen weaned down while keeping her 

comfortable in order to attempt to get her home.





Past Medical/Surgical History


Medical History:


COPD on home O2- 2LNC during day, 4LNC during night


Htn


UC


Depression


Dyslipidemia


Surgical History:


Coloscopy


EGD


Knee surgery


Total hip, right


Hysterectomy





Social History


Smoking Status:  Current Some Day Smoker


History of Alcohol Use:  No


Marital Status:  


Housing Status:  assisted living


Occupation Status:  retired





Review of Systems


Constitutional:  + weakness, + fatigue


ENT:  No trouble swallowing


Respiratory:  + cough, + dyspnea on exertion, + dyspnea at rest


Cardiac:  No chest pain, No edema


Abdomen:  No pain, No nausea, No vomiting


Musculoskeletal:  + problem reported (left ankle pain)


Female :  No problem reported


Psychiatric:  No depression symptoms, No anxiety





Allergies


Coded Allergies:  


     Sulfa Drugs (Verified  Allergy, Unknown, `, 5/18/14)


     Sulfamethoxazole (Verified  Allergy, Unknown, `, 5/18/14)


     Trimethoprim (Verified  Allergy, Unknown, `, 5/18/14)





Medications





Current Inpatient Medications








 Medications


  (Trade)  Dose


 Ordered  Sig/Donna


 Route  Start Time


 Stop Time Status Last Admin


Dose Admin


 


 Levalbuterol


  (Xopenex 1.25MG/


 3ML Neb)  1.25 mg  Q6R


 INH  1/1/18 22:30


 1/31/18 22:29  1/11/18 14:02


1.25 MG


 


 Tramadol HCl


  (Ultram Tab)  50 mg  Q6H  PRN


 PO  1/1/18 21:30


 1/31/18 21:29  1/11/18 09:02


50 MG


 


 Clonidine HCl


  (Catapres Tab)  0.1 mg  Q6H  PRN


 PO  1/1/18 21:30


 1/31/18 21:29 Future Hold 1/3/18 16:21


0.1 MG


 


 Citalopram


 Hydrobromide


  (celeXA TAB)  40 mg  DAILY


 PO  1/2/18 09:00


 2/1/18 08:59  1/11/18 07:48


40 MG


 


 Latanoprost


  (Xalatan Oph


 Soln)  1 drops  HS


 OP  1/2/18 21:00


 2/1/18 20:59  1/10/18 20:25


1 DROPS


 


 Mesalamine


  (Pentasa


 Controlled Rel


 Cap)  1,000 mg  BID


 PO  1/2/18 09:00


 2/1/18 08:59  1/11/18 07:48


1,000 MG


 


 Mirtazapine


  (Remeron Tab)  15 mg  HS


 PO  1/2/18 21:00


 2/1/18 20:59  1/10/18 20:28


15 MG


 


 Pantoprazole


 Sodium


  (Protonix Tab)  40 mg  DAILY


 PO  1/2/18 09:00


 2/1/18 08:59  1/11/18 07:47


40 MG


 


 Senna/Docusate


 Sodium


  (Senokot S Tab)  1 tab  DAILY


 PO  1/2/18 09:00


 2/1/18 08:59  1/8/18 08:17


1 TAB


 


 Simvastatin


  (Zocor Tab)  20 mg  HS


 PO  1/2/18 21:00


 2/1/18 20:59  1/10/18 20:27


20 MG


 


 Acetaminophen


  (Tylenol Tab)  650 mg  Q4H  PRN


 PO  1/1/18 21:45


 1/31/18 21:44  1/10/18 17:23


650 MG


 


 Ondansetron HCl


  (Zofran Inj)  4 mg  Q6H  PRN


 IV  1/1/18 21:45


 1/31/18 21:44   


 


 


 Piperacillin Sod/


 Tazobactam Sod


 4.5 gm/Dextrose  120 ml @ 


 30 mls/hr  Q8H


 IV  1/2/18 02:00


 1/11/18 19:00  1/11/18 10:00


30 MLS/HR


 


 Piperacillin Sod/


 Tazobactam Sod


  (Consult)  1 ea  UD  PRN


 N/A  1/2/18 01:15


 1/11/18 17:00   


 


 


 Heparin Sodium/


 Dextrose  500 ml @ 


 25 mls/hr  Q20H PRN


 IV  1/6/18 08:30


 2/5/18 08:29 Future Hold 1/9/18 18:22


25 MLS/HR


 


 Methylprednisolone


 Sodium Succinate


 40 mg/Syringe  0.64 ml @ 


 1.5 mls/min  Q12H


 IV  1/6/18 20:00


 2/5/18 19:59  1/11/18 07:46


1.5 MLS/MIN


 


 Acetylcysteine


  (Mucomyst 20%


 Inh Soln)  3 ml  BIDR


 INH  1/6/18 20:00


 2/5/18 19:59  1/10/18 19:29


3 ML


 


 Diltiazem HCl


  (Cardizem Cd Cap)  240 mg  QAM


 PO  1/7/18 09:00


 2/5/18 08:59  1/11/18 07:47


240 MG


 


 Insulin Aspart


  (novoLOG ASPART)  **SLIDING


 SCALE**


 **G...  ACHS


 SC  1/9/18 21:00


 2/8/18 20:59  1/11/18 11:55


3 UNITS


 


 Glucose


  (Glucose 40% Gel)  15-30


 GRAMS 15


 GRAMS...  UD  PRN


 PO  1/9/18 17:15


 2/8/18 17:14   


 


 


 Glucose


  (Glucose Chew


 Tab)  4-8


 Tablets 4


 Tabl...  UD  PRN


 PO  1/9/18 17:15


 2/8/18 17:14   


 


 


 Dextrose


  (Dextrose 50%


 50ML Syringe)  25-50ML OF


 50% DW IV


 FOR...  UD  PRN


 IV  1/9/18 17:15


 2/8/18 17:14   


 


 


 Glucagon


  (Glucagon Inj)  1 mg  UD  PRN


 SQ  1/9/18 17:15


 2/8/18 17:14   


 


 


 Hydralazine HCl


  (HydrALAZINE INJ)  10 mg  Q6H  PRN


 IV.  1/9/18 20:00


 2/8/18 19:59  1/10/18 15:32


10 MG


 


 Warfarin Sodium


  (Coumadin Tab)  5 mg  DAILY@16


 PO  1/10/18 16:00


 2/9/18 15:59  1/10/18 16:35


5 MG


 


 Lisinopril


  (Zestril Tab)  10 mg  BID


 PO  1/10/18 21:00


 2/9/18 08:59  1/11/18 07:49


10 MG


 


 Furosemide


  (Lasix Tab)  20 mg  QAM


 PO  1/11/18 09:00


 2/10/18 08:59  1/11/18 07:48


20 MG


 


 Morphine Sulfate


  (MoRPHine


 SULFATE INJ)  1 mg  Q4H  PRN


 IV  1/11/18 11:30


 1/25/18 11:29  1/11/18 11:02


1 MG











Physical Exam











  Date Time  Temp Pulse Resp B/P (MAP) Pulse Ox O2 Delivery O2 Flow Rate FiO2


 


1/11/18 14:03  72 20  92 Nasal Cannula 50.0 75


 


1/11/18 12:00      High Flow Oxygen  


 


1/11/18 11:53 37.4 85 28 156/67 (96) 93 High Flow Oxygen  50


 


1/11/18 08:06 36.5 78 17 135/85 (102) 94 BiPAP  75


 


1/11/18 08:00      BiPAP  


 


1/11/18 07:18  79   93   75


 


1/11/18 07:17  79 17  93 BiPAP/CPAP  75


 


1/11/18 04:00      BiPAP  


 


1/11/18 03:33 36.5 82 33 149/59 (89) 94 CPAP  


 


1/11/18 01:43  74   92   75


 


1/11/18 01:42  74 30  92 BiPAP/CPAP  75


 


1/11/18 01:07    172/79 (110)    


 


1/11/18 00:00      BiPAP  


 


1/10/18 23:35 36.5 82 28 190/92 (124) 93 CPAP  


 


1/10/18 22:33  84   92   75


 


1/10/18 20:00      BiPAP  


 


1/10/18 19:30  82 20  92 Nasal Cannula 50.0 75


 


1/10/18 18:53 36.4 92 27 183/85 (117) 92 High Flow Oxygen  


 


1/10/18 16:50  88  181/73 (109)    


 


1/10/18 16:00      BiPAP  


 


1/10/18 15:33  91  198/74 (115)    


 


1/10/18 15:15 36.4 80 20  96 High Flow Oxygen  








General Appearance:  no apparent distress (but is visibly SOB), + obese, + 

pertinent finding (chronically ill appearing)


ENT:  hearing grossly normal


Neck:  supple, no JVD


Respiratory:  no respiratory distress, no accessory muscle use, + decreased 

breath sounds


Cardiovascular:  regular rate, rhythm, no edema (of right lower extremity), + 

normal peripheral pulses (right pedal pulse palpable, left foot toes are warm 

with good sensation)


Abdomen:  normal bowel sounds, non tender, soft


Musculoskeletal:  pertinent finding (left lower extremity and foot covered with 

splint and ACE wrap)


Neurologic/Psychiatric:  alert, normal mood/affect, oriented x 3


Skin:  + pertinent finding (fragile skin with scattered ecchymosis)





Laboratory Results





Last 24 Hours








Test


  1/10/18


16:00 1/10/18


20:13 1/11/18


05:59 1/11/18


06:28


 


Bedside Glucose 215 mg/dl  215 mg/dl   216 mg/dl 


 


Prothrombin Time   15.5 SECONDS  


 


Prothromb Time International


Ratio 


  


  1.5 


  


 


 


Sodium Level   135 mmol/L  


 


Potassium Level   3.1 mmol/L  


 


Chloride Level   87 mmol/L  


 


Carbon Dioxide Level   44 mmol/L  


 


Anion Gap   5.0 mmol/L  


 


Blood Urea Nitrogen   22 mg/dl  


 


Creatinine   0.59 mg/dl  


 


Est Creatinine Clear Calc


Drug Dose 


  


  89.7 ml/min 


  


 


 


Estimated GFR (


American) 


  


  98.9 


  


 


 


Estimated GFR (Non-


American 


  


  85.4 


  


 


 


BUN/Creatinine Ratio   37.9  


 


Random Glucose   178 mg/dl  


 


Calcium Level   8.3 mg/dl  


 


Phosphorus Level   3.2 mg/dl  


 


Magnesium Level   2.3 mg/dl  


 


Test


  1/11/18


11:23 


  


  


 


 


Bedside Glucose 185 mg/dl    











Assessment & Plan


Palliative Performance Scale:  30 % (unable to get OOB at this time)





Problem list:


Pain, left ankle and sacrum (from lying in bed)


Weakness


SOB/BRANDT


COPD, advanced


Acute on chronic respiratory failure


Left ankle fracture


Paroxysmal afib with RVR


Goals of care (Z51.5)





Palliative care recs:


-Patient confirmed she is DNR/DNI.


-Focus on comfort at this point. Patient does not want to undergo surgery with 

risk of requiring prolonged ventilation/trach/PEG.


-Patient's ultimate goal would be to get home with hospice. She does understand 

that this may not be possible with her poor respiratory status, but we will 

certainly try. 


-Daughter, Kirti Miller, states she would be able to be with patient 24/7 at home. 

They understand hospice is not 24/7 care in the home but would be support to 

keep patient home, comfortable, and to allow natural death. Patient confirms 

this is her goal.


-Pulmonary will continue to work with patient on weaning oxygen to a level that 

can be maintained at home. Maximum amount of oxygen that can be provided in the 

home by hospice is 10-12LPM via mask.


-Patient also has been requiring Bipap at night which she may require at home. 

Uncertain if this is something hospice pays for- will need  to 

inquire about this as we get closer to discharge and the patient/family chooses 

a hospice agency.


-Currently she is ordered morphine 1mg IV Q4h PRN for pain. She has received 

one dose today. Will see what her 24-hour PRN needs are in order to dose for 

comfort for home. May need fentanyl patch for consistent/scheduled relief and 

use Roxanol PRN breakthrough pain or SOB.





Thank you kindly for consulted me on this nice patient. I will follow.

## 2018-01-11 NOTE — ORTHOPEDIC PROGRESS NOTE
Orthopedic Progress Note


Date of Service


Jan 11, 2018.





Subjective


Additional Notes:


Pt seen today for splint check.  Pt is sitting up in bed, alert, oriented.  

States that her ankle has been about the same as far as pain goes.  No increase 

in pain.  Had some pain this AM but was relieved with pain meds.  No new 

complaints with the ankle.





Objective


Toes pink and warm.  Moving the toes on the left foot.  Sensation intact. 

Splint is intact and dry.











  Date Time  Temp Pulse Resp B/P (MAP) Pulse Ox O2 Delivery O2 Flow Rate FiO2


 


1/11/18 14:03  72 20  92 Nasal Cannula 50.0 75


 


1/11/18 12:00      High Flow Oxygen  


 


1/11/18 11:53 37.4 85 28 156/67 (96) 93 High Flow Oxygen  50


 


1/11/18 08:06 36.5 78 17 135/85 (102) 94 BiPAP  75


 


1/11/18 08:00      BiPAP  


 


1/11/18 07:18  79   93   75


 


1/11/18 07:17  79 17  93 BiPAP/CPAP  75


 


1/11/18 04:00      BiPAP  


 


1/11/18 03:33 36.5 82 33 149/59 (89) 94 CPAP  


 


1/11/18 01:43  74   92   75


 


1/11/18 01:42  74 30  92 BiPAP/CPAP  75


 


1/11/18 01:07    172/79 (110)    


 


1/11/18 00:00      BiPAP  


 


1/10/18 23:35 36.5 82 28 190/92 (124) 93 CPAP  


 


1/10/18 22:33  84   92   75


 


1/10/18 20:00      BiPAP  


 


1/10/18 19:30  82 20  92 Nasal Cannula 50.0 75


 


1/10/18 18:53 36.4 92 27 183/85 (117) 92 High Flow Oxygen  


 


1/10/18 16:50  88  181/73 (109)    








Laboratory Results 24 Hours:











Test


  1/11/18


05:59


 


Prothromb Time International


Ratio 1.5 


 


 


Prothrombin Time 15.5 SECONDS 











Assessment & Plan


Assessment:


Left Bimalleolar ankle fracture


Plan:


Discussed prognosis and current treatment plan for patient with Arvin KEN.  Pt and family have agreed on Palliative care at this time.  No surgery 

planned due to pulmonary status.  


We will plan on changing her splint tomorrow to assess her skin and any 

swelling she may have.  At that time, we will either reapply a temporary splint 

or a high tide walking boot to the ankle. Goal will be for her to transfer a 

little easier with the new splint/boot.  Discussed case with Dr Dc.

## 2018-01-11 NOTE — PROGRESS NOTE
Internal Med Progress Note


Date of Service:


Jan 11, 2018.


Provider Documentation:





SUBJECTIVE:





The patient was seen and examined 


Complains of minimal SOB at rest


Denies any other symptoms


Much better today -but requiring more flow to maintain adequet saturation


No more Hematuria 


Has had discussion with the Daughter,Pulmonary and palliative care 








OBJECTIVE:





Vital Signs-as noted below





Exam:


General-Minimal distress at rest


Eyes-normal


ENT-normal


Neck-supple


Lungs-Decreased breath sound bilaterally 


Minimal wheezing bilaterally 


Heart-Regular,no murmur 


Abdomen-Benign,no masses,bowel sound present 


Extremities-No edema 


Neuro-AAOx3





Lab data as noted below.


ASSESSMENT & PLAN:








Acute hypercapnic respiratory failure 


Secondary to COPD exacerbation and complicated by Pneumonia


CXR showed dense bibasilar airspace consolidation, right greater than left with 

associated pleural effusions.


ABG on admission showed respiratory acidosis with comp


Has been on  Levaquin and Zosyn


Negative influenza test 


Continue Solumedrol


Continue BIPAP with intermittent  high flow supplement oxygen 


Blood cx no growth


CT with PE protocol showed no evidence for PE. Dense bilateral lower lobe 

atelectasis/consolidation. Patchy airspace


opacities are also present within the right upper lobe lingula and right middle 

lobe.


Advised to USE BIPAP as much as she can to improve CO2 level and improve 

drowsiness


Lasix 40mg BID ot help fluid overload 


IV Vancomycin added to broaden the spectrum -discontinued


ECHO


* The echocardiogram is extremely technically limited.


* Patient was sitting upright for the test.


* There is no significant pericardial effusion noted.


* The left ventricular systolic function is grossly normal on limited 

visualization.


* The right ventricular systolic function is grossly normal.


* The valves are poorly visualized.


* There is no significant valvular stenosis or regurgitation on technically 

limited Doppler evaluation.


Continue current antibiotics for 3 more days 


Appreciate Pulmonary evaluation 


After a long discussion with the Daughter,palliative care and pulmonary ,

nonsurgical approach is taken


Wean of the High Flow Oxygen and maintain saturation >85%








Paroxysmal atrial flutter with rapid ventricular response


Due to acute hypercapnic respiratory failure


Received Cardizem and digoxin


Continue on heparin drip till therapeutic with Coumadin 


Continue Cardizem 240mg


Rate is controlled and denies any symptoms No acute issue 





Hyponatremia


Possible related to hypovolemia  


Na on admission 128


Na today 134


Improved to normal





Hypokalemia ,admitted with Hyperkalemia


Lisinopril was on hold and leter on developed Hypo\kalemia


Getting Supplement 


Lisinopril is restarted and diet changed 





Left Ankle fracture s/p fall 


Left ankle xray showed distal tibial and fibular fractures as above with mild 

lateral subluxation of the talus.


Ortho on board


Continue required high flow oxygen and BIPAP for acute hypercapnic respiratory 

failure 


Continue to postpone surgery until respiratory status improves


Likely to have Conservative management of the Ankle


Surgery will carry a higher risk 


The issue was discussed in detailed with the Daughter and the patient


WILL NEED SURGERY FOR THE ANKLE FRACTURE AS PER ORTHO 





Right pelvic ring fracture, 


Pelvis xray showed an age indeterminant right pubic ring fracture, possibly 

chronic.


Stable








Hypertension


BP elevated 


Continue lisinopril.  


will add amlodipine


PRN clonidine


Continue monitor BP








Ulcerative colitis


On Pentasa


Stable





Depression


On Celexa


Stable





DVT px 


On heparin drip





CODE STATUS DNR 





DISPOSITION


Continue monitor in tele


Consultants:


Pulmonary





Vital Signs:











  Date Time  Temp Pulse Resp B/P (MAP) Pulse Ox O2 Delivery O2 Flow Rate FiO2


 


1/11/18 14:03  72 20  92 Nasal Cannula 50.0 75


 


1/11/18 12:00      High Flow Oxygen  


 


1/11/18 11:53 37.4 85 28 156/67 (96) 93 High Flow Oxygen  50


 


1/11/18 08:06 36.5 78 17 135/85 (102) 94 BiPAP  75


 


1/11/18 08:00      BiPAP  


 


1/11/18 07:18  79   93   75


 


1/11/18 07:17  79 17  93 BiPAP/CPAP  75


 


1/11/18 04:00      BiPAP  


 


1/11/18 03:33 36.5 82 33 149/59 (89) 94 CPAP  


 


1/11/18 01:43  74   92   75


 


1/11/18 01:42  74 30  92 BiPAP/CPAP  75


 


1/11/18 01:07    172/79 (110)    


 


1/11/18 00:00      BiPAP  


 


1/10/18 23:35 36.5 82 28 190/92 (124) 93 CPAP  


 


1/10/18 22:33  84   92   75


 


1/10/18 20:00      BiPAP  


 


1/10/18 19:30  82 20  92 Nasal Cannula 50.0 75


 


1/10/18 18:53 36.4 92 27 183/85 (117) 92 High Flow Oxygen  


 


1/10/18 16:50  88  181/73 (109)    


 


1/10/18 16:00      BiPAP  


 


1/10/18 15:33  91  198/74 (115)    


 


1/10/18 15:15 36.4 80 20  96 High Flow Oxygen  








Lab Results:





Results Past 24 Hours








Test


  1/10/18


16:00 1/10/18


20:13 1/11/18


05:59 1/11/18


06:28 Range/Units


 


 


Bedside Glucose 215 215  216 70-90  mg/dl


 


Prothrombin Time


  


  


  15.5


  


  9.0-12.0


SECONDS


 


Prothromb Time International


Ratio 


  


  1.5


  


  0.9-1.1  


 


 


Sodium Level   135  136-145  mmol/L


 


Potassium Level   3.1  3.5-5.1  mmol/L


 


Chloride Level   87    mmol/L


 


Carbon Dioxide Level   44  21-32  mmol/L


 


Anion Gap   5.0  3-11  mmol/L


 


Blood Urea Nitrogen   22  7-18  mg/dl


 


Creatinine


  


  


  0.59


  


  0.60-1.20


mg/dl


 


Est Creatinine Clear Calc


Drug Dose 


  


  89.7


  


   ml/min


 


 


Estimated GFR (


American) 


  


  98.9


  


   


 


 


Estimated GFR (Non-


American 


  


  85.4


  


   


 


 


BUN/Creatinine Ratio   37.9  10-20  


 


Random Glucose   178  70-99  mg/dl


 


Calcium Level   8.3  8.5-10.1  mg/dl


 


Phosphorus Level   3.2  2.5-4.9  mg/dl


 


Magnesium Level   2.3  1.8-2.4  mg/dl


 


Test


  1/11/18


11:23 


  


  


  Range/Units


 


 


Bedside Glucose 185    70-90  mg/dl

## 2018-01-11 NOTE — DIAGNOSTIC IMAGING REPORT
SINGLE VIEW CHEST



CLINICAL HISTORY:  Hypoxia.



FINDINGS: 2 AP, portable, upright chest radiographs are compared to study dated

1/8/2018 and correlated with chest CT dated 9/1/2011. The examination is

degraded by portable technique and patient rotation.  The heart is enlarged and

there is atherosclerotic calcification of the thoracic aorta. The pulmonary

vasculature is noncongested. There is dense bibasilar airspace consolidation,

right greater than left. Small pleural effusions are identified. No pneumothorax

is seen. The skeletal structures are osteopenic. The bony thorax is grossly

intact. Calcific tendinopathy is noted in the right shoulder.



IMPRESSION:



1. Cardiomegaly without radiographic evidence of congestive failure.



2. There is dense bibasilar airspace consolidation, right greater than left with

associated pleural effusions. The appearance is typical for pneumonia/aspiration

pneumonitis. This is similar in appearance to yesterday. Continued follow-up to

resolution is recommended.







Electronically signed by:  Arvin Bailey M.D.

1/11/2018 7:39 AM



Dictated Date/Time:  1/11/2018 7:38 AM

## 2018-01-12 VITALS — OXYGEN SATURATION: 91 %

## 2018-01-12 VITALS
SYSTOLIC BLOOD PRESSURE: 146 MMHG | HEART RATE: 77 BPM | OXYGEN SATURATION: 94 % | TEMPERATURE: 98.42 F | DIASTOLIC BLOOD PRESSURE: 70 MMHG

## 2018-01-12 VITALS
SYSTOLIC BLOOD PRESSURE: 143 MMHG | OXYGEN SATURATION: 92 % | TEMPERATURE: 97.52 F | HEART RATE: 77 BPM | DIASTOLIC BLOOD PRESSURE: 65 MMHG

## 2018-01-12 VITALS
SYSTOLIC BLOOD PRESSURE: 186 MMHG | TEMPERATURE: 97.52 F | HEART RATE: 83 BPM | OXYGEN SATURATION: 94 % | DIASTOLIC BLOOD PRESSURE: 85 MMHG

## 2018-01-12 VITALS — HEART RATE: 80 BPM | OXYGEN SATURATION: 91 %

## 2018-01-12 VITALS — HEART RATE: 80 BPM | OXYGEN SATURATION: 92 %

## 2018-01-12 VITALS
SYSTOLIC BLOOD PRESSURE: 179 MMHG | TEMPERATURE: 97.52 F | DIASTOLIC BLOOD PRESSURE: 65 MMHG | HEART RATE: 89 BPM | OXYGEN SATURATION: 88 %

## 2018-01-12 VITALS — OXYGEN SATURATION: 91 % | HEART RATE: 81 BPM

## 2018-01-12 VITALS
SYSTOLIC BLOOD PRESSURE: 159 MMHG | DIASTOLIC BLOOD PRESSURE: 83 MMHG | OXYGEN SATURATION: 92 % | HEART RATE: 82 BPM | TEMPERATURE: 98.6 F

## 2018-01-12 VITALS — HEART RATE: 78 BPM | OXYGEN SATURATION: 92 %

## 2018-01-12 VITALS — OXYGEN SATURATION: 90 % | HEART RATE: 77 BPM

## 2018-01-12 VITALS — OXYGEN SATURATION: 92 %

## 2018-01-12 LAB
BUN SERPL-MCNC: 23 MG/DL (ref 7–18)
CALCIUM SERPL-MCNC: 8.5 MG/DL (ref 8.5–10.1)
CO2 SERPL-SCNC: 42 MMOL/L (ref 21–32)
CREAT SERPL-MCNC: 0.62 MG/DL (ref 0.6–1.2)
GLUCOSE SERPL-MCNC: 188 MG/DL (ref 70–99)
INR PPP: 2.2 (ref 0.9–1.1)
POTASSIUM SERPL-SCNC: 3.2 MMOL/L (ref 3.5–5.1)
SODIUM SERPL-SCNC: 136 MMOL/L (ref 136–145)

## 2018-01-12 PROCEDURE — 0SSGX5Z REPOSITION LEFT ANKLE JOINT WITH EXTERNAL FIXATION DEVICE, EXTERNAL APPROACH: ICD-10-PCS | Performed by: ORTHOPAEDIC SURGERY

## 2018-01-12 PROCEDURE — 3E0U3BZ INTRODUCTION OF ANESTHETIC AGENT INTO JOINTS, PERCUTANEOUS APPROACH: ICD-10-PCS | Performed by: ORTHOPAEDIC SURGERY

## 2018-01-12 RX ADMIN — PANTOPRAZOLE SCH MG: 40 TABLET, DELAYED RELEASE ORAL at 09:18

## 2018-01-12 RX ADMIN — LISINOPRIL SCH MG: 10 TABLET ORAL at 20:48

## 2018-01-12 RX ADMIN — SIMVASTATIN SCH MG: 20 TABLET, FILM COATED ORAL at 20:48

## 2018-01-12 RX ADMIN — INSULIN ASPART SCH UNITS: 100 INJECTION, SOLUTION INTRAVENOUS; SUBCUTANEOUS at 12:52

## 2018-01-12 RX ADMIN — METHYLPREDNISOLONE SODIUM SUCCINATE SCH MLS/MIN: 1 INJECTION, POWDER, FOR SOLUTION INTRAMUSCULAR; INTRAVENOUS at 20:47

## 2018-01-12 RX ADMIN — METHYLPREDNISOLONE SODIUM SUCCINATE SCH MLS/MIN: 1 INJECTION, POWDER, FOR SOLUTION INTRAMUSCULAR; INTRAVENOUS at 09:18

## 2018-01-12 RX ADMIN — LEVALBUTEROL HYDROCHLORIDE SCH MG: 1.25 SOLUTION RESPIRATORY (INHALATION) at 07:05

## 2018-01-12 RX ADMIN — INSULIN ASPART SCH UNITS: 100 INJECTION, SOLUTION INTRAVENOUS; SUBCUTANEOUS at 09:33

## 2018-01-12 RX ADMIN — LEVALBUTEROL HYDROCHLORIDE SCH MG: 1.25 SOLUTION RESPIRATORY (INHALATION) at 14:09

## 2018-01-12 RX ADMIN — MESALAMINE SCH MG: 250 CAPSULE ORAL at 09:19

## 2018-01-12 RX ADMIN — INSULIN ASPART SCH UNITS: 100 INJECTION, SOLUTION INTRAVENOUS; SUBCUTANEOUS at 18:19

## 2018-01-12 RX ADMIN — MORPHINE SULFATE PRN MG: 2 INJECTION, SOLUTION INTRAMUSCULAR; INTRAVENOUS at 22:55

## 2018-01-12 RX ADMIN — LEVALBUTEROL HYDROCHLORIDE SCH MG: 1.25 SOLUTION RESPIRATORY (INHALATION) at 01:56

## 2018-01-12 RX ADMIN — INSULIN ASPART SCH UNITS: 100 INJECTION, SOLUTION INTRAVENOUS; SUBCUTANEOUS at 20:53

## 2018-01-12 RX ADMIN — ACETYLCYSTEINE SCH ML: 200 SOLUTION ORAL; RESPIRATORY (INHALATION) at 07:05

## 2018-01-12 RX ADMIN — FUROSEMIDE SCH MG: 20 TABLET ORAL at 09:19

## 2018-01-12 RX ADMIN — MESALAMINE SCH MG: 250 CAPSULE ORAL at 20:47

## 2018-01-12 RX ADMIN — LEVALBUTEROL HYDROCHLORIDE SCH MG: 1.25 SOLUTION RESPIRATORY (INHALATION) at 18:54

## 2018-01-12 RX ADMIN — LISINOPRIL SCH MG: 10 TABLET ORAL at 09:18

## 2018-01-12 RX ADMIN — WARFARIN SODIUM SCH MG: 5 TABLET ORAL at 17:19

## 2018-01-12 RX ADMIN — STANDARDIZED SENNA CONCENTRATE AND DOCUSATE SODIUM SCH TAB: 8.6; 5 TABLET ORAL at 09:18

## 2018-01-12 RX ADMIN — MIRTAZAPINE SCH MG: 15 TABLET, FILM COATED ORAL at 20:48

## 2018-01-12 RX ADMIN — ACETYLCYSTEINE SCH ML: 200 SOLUTION ORAL; RESPIRATORY (INHALATION) at 18:54

## 2018-01-12 RX ADMIN — DILTIAZEM HYDROCHLORIDE SCH MG: 240 CAPSULE, EXTENDED RELEASE ORAL at 09:19

## 2018-01-12 RX ADMIN — CITALOPRAM HYDROBROMIDE SCH MG: 40 TABLET ORAL at 09:19

## 2018-01-12 RX ADMIN — LATANOPROST SCH DROPS: 50 SOLUTION/ DROPS OPHTHALMIC at 20:47

## 2018-01-12 NOTE — DIAGNOSTIC IMAGING REPORT
L ANKLE MIN 3 VIEWS ROUTINE



CLINICAL HISTORY: post reduction films s/p cast application   



COMPARISON STUDY:  Left ankle 1/1/2018.



FINDINGS: Improved anatomic alignment of the left ankle trimalleolar fracture.

The distal fibular fracture demonstrates up to 2 mm of lateral displacement.

Overlying cast material which appears fine bony detail. No dislocation.



IMPRESSION:  Improved anatomic alignment of the left trimalleolar ankle fracture

as described above. 









Electronically signed by:  Paul Herrera M.D.

1/12/2018 5:20 PM



Dictated Date/Time:  1/12/2018 5:20 PM

## 2018-01-12 NOTE — PALLIATIVE CARE PROGRESS NOTE
Palliative Care Progress Note


Date of Service


Jan 12, 2018.





Subjective


Pt evaluation today including:  conversation w/ patient, conversation w/ 

consultant (JARROD Bledsoe PA-C)


Pain:  0/10


PO Intake:  tolerating diet


Met briefly with patient this afternoon. She remains on the high-flow nasal 

cannula at 50L until procedure is done by ortho to cast/splint the left ankle. 

Her pain is well-controlled, hasn't had a dose of morphine since last evening 

around 2000. Patient states she has been resting comfortably all day, currently 

has no complaints. The plan remains to wean the oxygen down after procedure in 

efforts to work towards getting patient home with family and hospice. I will 

follow up on Monday.

## 2018-01-12 NOTE — PULMONOLOGY PROGRESS NOTE
Pulmonary Progress Note


Date of Service


Jan 12, 2018.





Attending


Dr. Marquis





Subjective


Patient continues complaining about BiPAP. She knows that she has to use it for 

now but wishes to discontinue when able. Tolerating high flow O2. Patient seems 

more alert today and is comfortable. She denies any significant pain. Her left 

ankle but has occasional stabbing sensation. She has no fever or chills. She 

denied denies nausea or vomiting. Full catheter in place. Good urine output





Objective








Vital Signs - as noted below





Laboratory Data - as noted below





Physical Exam:


General - NAD. Pleasant


Eyes - No icterus, gaze conjugate


ENT - Mucosa moist, no lesions or candidiasis


Neck - Supple, No JVD


Lungs - No bronchospasm, rales, or rhonchi. However, breath sounds continue to 

be decreased


Heart - Regular, rate controlled


Abdomen - Soft, NT, ND, BS present 


Extremities - No edema, pedal pulses intact. Splint in place to left leg


Neuro - A&OX3











Labs: 


   ABG 1/8/2018 -  7.44/70/79/46/94.6% on 70% FiO2 BiPAP


   ABG 01/06/2018--7.41/63/71/39/92.1% on 70% FiO2 BiPAP


   ABG 01/05/2018--7.27/80/116/36/96.7% on high flow nasal cannula


   


Imaging reviewed. 








Assessment & Plan


Lengthy discussion 1/11/18 with patient, daughter Kirti Miller, OmayraACLOS Mo from palliative care. Discussed case with anesthesia yesterday and they 

agree that patient would require endotracheal intubation with mechanical 

ventilation for any repair. Discussed concern with patient and family that we 

are not confident that we would be able to successfully extubate and that 

patient would require tracheostomy tube and peg tube. Patient noted that she 

would not want that and that she would prefer to continue with DNR/DNI and 

focus on comfort. We will also begin to titrate off of Hi Aditya O2 to an Oxymask. 

Our goal will be to maintain SaO2 above 85% and get  home on hospice.   





Orthopedics was able to successfully reposition the left foot using a nerve 

block. Patient tolerated well. A fiberglass cast was put in place. At this 

point the patient is able to do toe touch with her left leg. Would encourage 

out of bed to chair as possible. Recommend PT/OT. 





ACUTE ON CHRONIC HYPOXIC/HYPERCAPNIC RESPIRATORY FAILURE


* Patient currently on high flow during the day and BiPAP at night


* Currently working on getting patient home for home health/hospice care


* We'll work aggressively tomorrow to decrease oxygen requirements


* Need to get patient out of bed to chair


* Okay to take high flow off for trials of Oxymask





DVT PROPHYLAXIS


* Heparin drip


* Patient currently nonambulatory


* Patient will be at increased risk for fall





DISPOSITION:


* Consult for palliative care. Goal is to provide comfort and get patient home 

on hospice for end of life care





Thank you for including us in the care of this patient. Please refer to Dr. Marquis's addendum for further recommendations.





We will sign off at this time. Please feel free to reconsult as needed





Physician Supervision Note:





I was present with Arvin Bldesoe PA-C during the history and exam. I discussed 

the case with him and agree with the findings and plan as documented in the 

note.  Any exceptions or clarifications are listed here: 





Patient with acute on chronic respiratory failure, advanced COPD on home O2, non

-compliant with therapy and still smoking, s/p malleolar fracture. Has 

worsening respiratory failure now from COPD exacerbation, on BIPAP alternating 

with high-flow nasal cannula.


Under nerve block the fracture was reduced and placed in a cast. Not a 

candidate for general anesthesia. 


Continue treatment for COPD exacerbation, nocturnal BIPAP.


OOB to chair


Patient is choosing to be DNR/DNI and continue with comfort care instead of 

surgical approach, given the risks associated with anesthesia


Palliative care


Will sign off at this point


Documented By:  Karl Marquis MD





Data


Medications:





Current Inpatient Medications








 Medications


  (Trade)  Dose


 Ordered  Sig/Donna


 Route  Start Time


 Stop Time Status Last Admin


Dose Admin


 


 Levalbuterol


  (Xopenex 1.25MG/


 3ML Neb)  1.25 mg  Q6R


 INH  1/1/18 22:30


 1/31/18 22:29  1/12/18 14:09


1.25 MG


 


 Tramadol HCl


  (Ultram Tab)  50 mg  Q6H  PRN


 PO  1/1/18 21:30


 1/31/18 21:29  1/11/18 09:02


50 MG


 


 Clonidine HCl


  (Catapres Tab)  0.1 mg  Q6H  PRN


 PO  1/1/18 21:30


 1/31/18 21:29 Future Hold 1/3/18 16:21


0.1 MG


 


 Citalopram


 Hydrobromide


  (celeXA TAB)  40 mg  DAILY


 PO  1/2/18 09:00


 2/1/18 08:59  1/12/18 09:19


40 MG


 


 Latanoprost


  (Xalatan Oph


 Soln)  1 drops  HS


 OP  1/2/18 21:00


 2/1/18 20:59  1/11/18 20:29


1 DROPS


 


 Mesalamine


  (Pentasa


 Controlled Rel


 Cap)  1,000 mg  BID


 PO  1/2/18 09:00


 2/1/18 08:59  1/12/18 09:19


1,000 MG


 


 Mirtazapine


  (Remeron Tab)  15 mg  HS


 PO  1/2/18 21:00


 2/1/18 20:59  1/11/18 20:27


15 MG


 


 Pantoprazole


 Sodium


  (Protonix Tab)  40 mg  DAILY


 PO  1/2/18 09:00


 2/1/18 08:59  1/12/18 09:18


40 MG


 


 Senna/Docusate


 Sodium


  (Senokot S Tab)  1 tab  DAILY


 PO  1/2/18 09:00


 2/1/18 08:59  1/12/18 09:18


1 TAB


 


 Simvastatin


  (Zocor Tab)  20 mg  HS


 PO  1/2/18 21:00


 2/1/18 20:59  1/11/18 20:28


20 MG


 


 Acetaminophen


  (Tylenol Tab)  650 mg  Q4H  PRN


 PO  1/1/18 21:45


 1/31/18 21:44  1/10/18 17:23


650 MG


 


 Ondansetron HCl


  (Zofran Inj)  4 mg  Q6H  PRN


 IV  1/1/18 21:45


 1/31/18 21:44   


 


 


 Heparin Sodium/


 Dextrose  500 ml @ 


 25 mls/hr  Q20H PRN


 IV  1/6/18 08:30


 2/5/18 08:29 Future Hold 1/9/18 18:22


25 MLS/HR


 


 Methylprednisolone


 Sodium Succinate


 40 mg/Syringe  0.64 ml @ 


 1.5 mls/min  Q12H


 IV  1/6/18 20:00


 2/5/18 19:59  1/12/18 09:18


1.5 MLS/MIN


 


 Acetylcysteine


  (Mucomyst 20%


 Inh Soln)  3 ml  BIDR


 INH  1/6/18 20:00


 2/5/18 19:59  1/12/18 07:05


3 ML


 


 Diltiazem HCl


  (Cardizem Cd Cap)  240 mg  QAM


 PO  1/7/18 09:00


 2/5/18 08:59  1/12/18 09:19


240 MG


 


 Insulin Aspart


  (novoLOG ASPART)  **SLIDING


 SCALE**


 **G...  ACHS


 SC  1/9/18 21:00


 2/8/18 20:59  1/12/18 12:52


4 UNITS


 


 Glucose


  (Glucose 40% Gel)  15-30


 GRAMS 15


 GRAMS...  UD  PRN


 PO  1/9/18 17:15


 2/8/18 17:14   


 


 


 Glucose


  (Glucose Chew


 Tab)  4-8


 Tablets 4


 Tabl...  UD  PRN


 PO  1/9/18 17:15


 2/8/18 17:14   


 


 


 Dextrose


  (Dextrose 50%


 50ML Syringe)  25-50ML OF


 50% DW IV


 FOR...  UD  PRN


 IV  1/9/18 17:15


 2/8/18 17:14   


 


 


 Glucagon


  (Glucagon Inj)  1 mg  UD  PRN


 SQ  1/9/18 17:15


 2/8/18 17:14   


 


 


 Hydralazine HCl


  (HydrALAZINE INJ)  10 mg  Q6H  PRN


 IV.  1/9/18 20:00


 2/8/18 19:59  1/11/18 16:58


10 MG


 


 Warfarin Sodium


  (Coumadin Tab)  5 mg  DAILY@16


 PO  1/10/18 16:00


 2/9/18 15:59  1/12/18 17:19


5 MG


 


 Lisinopril


  (Zestril Tab)  10 mg  BID


 PO  1/10/18 21:00


 2/9/18 08:59  1/12/18 09:18


10 MG


 


 Furosemide


  (Lasix Tab)  20 mg  QAM


 PO  1/11/18 09:00


 2/10/18 08:59  1/12/18 09:19


20 MG


 


 Morphine Sulfate


  (MoRPHine


 SULFATE INJ)  1 mg  Q4H  PRN


 IV  1/11/18 11:30


 1/25/18 11:29  1/11/18 20:21


1 MG


 


 Lidocaine HCl


  (Xylocaine 1%


 Inj (Local))  30 ml  ONE  PRN


 INFIL  1/12/18 12:45


 1/12/18 23:59   


 








I & O:











24-Hour Column 


 


 1/13/18





 08:00


 


Intake Total 600 ml


 


Output Total 750 ml


 


Balance -150 ml








Vital Signs:











  Date Time  Temp Pulse Resp B/P (MAP) Pulse Ox O2 Delivery O2 Flow Rate FiO2


 


1/12/18 16:00      High Flow Oxygen  


 


1/12/18 15:17 36.9 77 20 146/70 (95) 94 Room Air  


 


1/12/18 14:09  78 22  92 Nasal Cannula 50.0 77


 


1/12/18 12:45 36.4 83 19 186/85 (118) 94 BiPAP 15.0 


 


1/12/18 12:00      High Flow Oxygen  


 


1/12/18 08:30      High Flow Oxygen  


 


1/12/18 08:19 36.4 89 25 179/65 (103) 88 High Flow Oxygen 15.0 


 


1/12/18 07:08  81 20  91 Nasal Cannula 50.0 75


 


1/12/18 04:00     92 BiPAP  


 


1/12/18 03:40 36.4 77 19 143/65 (91) 92 CPAP  


 


1/12/18 01:57  80   91   75


 


1/12/18 01:56  81 20  91 BiPAP/CPAP  75


 


1/12/18 01:29     91 BiPAP  


 


1/12/18 00:00     91 BiPAP  


 


1/11/18 23:33 37.0 80 21 164/73 (103) 89 High Flow Oxygen  


 


1/11/18 23:32  71   91   75


 


1/11/18 20:10    176/93 (120)    


 


1/11/18 20:00     92 High Flow Oxygen  


 


1/11/18 19:06  89 22  93 Nasal Cannula 50.0 75


 


1/11/18 18:45 37.4 81 28 190/137 (154) 90 High Flow Oxygen  





    202/93 (129)    








Laboratory Results:





Last 24 Hours








Test


  1/11/18


20:27 1/12/18


05:57 1/12/18


06:44 1/12/18


11:33


 


Bedside Glucose 213 mg/dl   201 mg/dl  210 mg/dl 


 


Prothrombin Time  22.4 SECONDS   


 


Prothromb Time International


Ratio 


  2.2 


  


  


 


 


Sodium Level  136 mmol/L   


 


Potassium Level  3.2 mmol/L   


 


Chloride Level  88 mmol/L   


 


Carbon Dioxide Level  42 mmol/L   


 


Anion Gap  6.0 mmol/L   


 


Blood Urea Nitrogen  23 mg/dl   


 


Creatinine  0.62 mg/dl   


 


Est Creatinine Clear Calc


Drug Dose 


  84.7 ml/min 


  


  


 


 


Estimated GFR (


American) 


  97.3 


  


  


 


 


Estimated GFR (Non-


American 


  84.0 


  


  


 


 


BUN/Creatinine Ratio  36.1   


 


Random Glucose  188 mg/dl   


 


Calcium Level  8.5 mg/dl   


 


Test


  1/12/18


16:27 


  


  


 


 


Bedside Glucose 197 mg/dl

## 2018-01-12 NOTE — PROGRESS NOTE
Internal Med Progress Note


Date of Service:


Jan 12, 2018.


Provider Documentation:





SUBJECTIVE:





The patient was seen and examined 


Clinically stable and denies any symptoms


Has had discussion with the Daughter,Pulmonary and palliative care 








OBJECTIVE:





Vital Signs-as noted below





Exam:


General-Minimal distress at rest


Eyes-normal


ENT-normal


Neck-supple


Lungs-Decreased breath sound bilaterally 


Minimal wheezing bilaterally 


Heart-Regular,no murmur 


Abdomen-Benign,no masses,bowel sound present 


Extremities-Trace edema bilaterally 


Neuro-AAOx3





Lab data as noted below.


ASSESSMENT & PLAN:








Acute hypercapnic respiratory failure 


Secondary to COPD exacerbation and complicated by Pneumonia


CXR showed dense bibasilar airspace consolidation, right greater than left with 

associated pleural effusions.


ABG on admission showed respiratory acidosis with comp


Has been on  Levaquin and Zosyn


Negative influenza test 


Continue Solumedrol


Continue BIPAP with intermittent  high flow supplement oxygen 


Blood cx no growth


CT with PE protocol showed no evidence for PE. Dense bilateral lower lobe 

atelectasis/consolidation. Patchy airspace


opacities are also present within the right upper lobe lingula and right middle 

lobe.


Advised to USE BIPAP as much as she can to improve CO2 level and improve 

drowsiness


Lasix 40mg BID ot help fluid overload 


IV Vancomycin added to broaden the spectrum -discontinued


ECHO


* The echocardiogram is extremely technically limited.


* Patient was sitting upright for the test.


* There is no significant pericardial effusion noted.


* The left ventricular systolic function is grossly normal on limited 

visualization.


* The right ventricular systolic function is grossly normal.


* The valves are poorly visualized.


* There is no significant valvular stenosis or regurgitation on technically 

limited Doppler evaluation.


Continue current antibiotics for 3 more days 


Appreciate Pulmonary evaluation 


After a long discussion with the Daughter,palliative care and pulmonary ,

nonsurgical approach is taken


Wean of the High Flow Oxygen and maintain saturation >85%


Preparing to send her home with face musk Oxygen delivery








Paroxysmal atrial flutter with rapid ventricular response


Due to acute hypercapnic respiratory failure


Received Cardizem and digoxin


Continue on heparin drip till therapeutic with Coumadin 


Continue Cardizem 240mg


Rate is controlled and denies any symptoms No acute issue 





Hyponatremia


Possible related to hypovolemia  


Na on admission 128


Na today 134


Improved to normal





Hypokalemia ,admitted with Hyperkalemia


Lisinopril was on hold and leter on developed Hypo\kalemia


Getting Supplement 


Lisinopril is restarted and diet changed 


Will continue to monitor





Left Ankle fracture s/p fall 


Left ankle xray showed distal tibial and fibular fractures as above with mild 

lateral subluxation of the talus.


Ortho on board


Continue required high flow oxygen and BIPAP for acute hypercapnic respiratory 

failure 


Continue to postpone surgery until respiratory status improves


Likely to have Conservative management of the Ankle


Surgery will carry a higher risk 


The issue was discussed in detailed with the Daughter and the patient


WILL NEED SURGERY FOR THE ANKLE FRACTURE AS PER ORTHO 


No surgery =-will get appropriate brace before discharge





Right pelvic ring fracture, 


Pelvis xray showed an age indeterminant right pubic ring fracture, possibly 

chronic.


Stable








Hypertension


BP elevated 


Continue lisinopril.  


will add amlodipine


PRN clonidine


Continue monitor BP








Ulcerative colitis


On Pentasa


Stable





Depression


On Celexa


Stable





DVT px 


On heparin drip





CODE STATUS DNR 





DISPOSITION


Continue monitor in tele


Consultants:


Pulmonary





Vital Signs:











  Date Time  Temp Pulse Resp B/P (MAP) Pulse Ox O2 Delivery O2 Flow Rate FiO2


 


1/12/18 12:45 36.4 83 19 186/85 (118) 94 BiPAP 15.0 


 


1/12/18 08:19 36.4 89 25 179/65 (103) 88 High Flow Oxygen 15.0 


 


1/12/18 07:08  81 20  91 Nasal Cannula 50.0 75


 


1/12/18 04:00     92 BiPAP  


 


1/12/18 03:40 36.4 77 19 143/65 (91) 92 CPAP  


 


1/12/18 01:57  80   91   75


 


1/12/18 01:56  81 20  91 BiPAP/CPAP  75


 


1/12/18 01:29     91 BiPAP  


 


1/12/18 00:00     91 BiPAP  


 


1/11/18 23:33 37.0 80 21 164/73 (103) 89 High Flow Oxygen  


 


1/11/18 23:32  71   91   75


 


1/11/18 20:10    176/93 (120)    


 


1/11/18 20:00     92 High Flow Oxygen  


 


1/11/18 19:06  89 22  93 Nasal Cannula 50.0 75


 


1/11/18 18:45 37.4 81 28 190/137 (154) 90 High Flow Oxygen  





    202/93 (129)    


 


1/11/18 16:00 37.2 81  148/65 (92)    


 


1/11/18 16:00      High Flow Oxygen  


 


1/11/18 14:03  72 20  92 Nasal Cannula 50.0 75








Lab Results:





Results Past 24 Hours








Test


  1/11/18


16:09 1/11/18


20:27 1/12/18


05:57 1/12/18


06:44 Range/Units


 


 


Bedside Glucose 192 213  201 70-90  mg/dl


 


Prothrombin Time


  


  


  22.4


  


  9.0-12.0


SECONDS


 


Prothromb Time International


Ratio 


  


  2.2


  


  0.9-1.1  


 


 


Sodium Level   136  136-145  mmol/L


 


Potassium Level   3.2  3.5-5.1  mmol/L


 


Chloride Level   88    mmol/L


 


Carbon Dioxide Level   42  21-32  mmol/L


 


Anion Gap   6.0  3-11  mmol/L


 


Blood Urea Nitrogen   23  7-18  mg/dl


 


Creatinine


  


  


  0.62


  


  0.60-1.20


mg/dl


 


Est Creatinine Clear Calc


Drug Dose 


  


  84.7


  


   ml/min


 


 


Estimated GFR (


American) 


  


  97.3


  


   


 


 


Estimated GFR (Non-


American 


  


  84.0


  


   


 


 


BUN/Creatinine Ratio   36.1  10-20  


 


Random Glucose   188  70-99  mg/dl


 


Calcium Level   8.5  8.5-10.1  mg/dl

## 2018-01-12 NOTE — PROCEDURE NOTE
Procedure Note


Date of Service


Jan 12, 2018.





Procedure Note


After informed consent obtained, intra-articular injection of the left ankle 

was performed utilizing 20 mL of 1% lidocaine under sterile technique.  The 

patient had extensive distal tibia ecchymoses minimal edema, skin intact 

overlying medial and lateral malleolus.  Once hematoma block had adequate time 

to take effect patient was positioned with the leg extended off the bed and 

utilizing mini C-arm fluoroscopy imaging was obtained to assess fracture 

position.  Utilizing closed reduction techniques fracture was reduced and 

confirmed under live C-arm fluoroscopy.  While holding reduction stockinette, 

cast padding was applied followed by 4 inch fiberglass cast material.  Extra 

attention was turned to the proximal and distal aspects of the cast to ensure 

adequate padding was located at the cast edges to protect the skin as well as 

the heel.  Once the cast had adequate time to dry repeat imaging was obtained 

to assess fracture position, demonstrating adequate reduction in both the AP 

and lateral position. The patient tolerated the procedure well.

## 2018-01-12 NOTE — ORTHOPEDIC PROGRESS NOTE
Orthopedic Progress Note


Date of Service


Jan 12, 2018.





Subjective


Additional Notes:


Patient seen at bedside, comfortable, denies pain, numbness or tingling in LLE.





Objective


LLE NVSI +EHL/FHL, SILT grossly, compartments soft NT, +ecchymosis anterior, 

medial tibia, skin intact.











  Date Time  Temp Pulse Resp B/P (MAP) Pulse Ox O2 Delivery O2 Flow Rate FiO2


 


1/12/18 15:17 36.9 77 20 146/70 (95) 94 Room Air  


 


1/12/18 14:09  78 22  92 Nasal Cannula 50.0 77


 


1/12/18 12:45 36.4 83 19 186/85 (118) 94 BiPAP 15.0 


 


1/12/18 08:19 36.4 89 25 179/65 (103) 88 High Flow Oxygen 15.0 


 


1/12/18 07:08  81 20  91 Nasal Cannula 50.0 75


 


1/12/18 04:00     92 BiPAP  


 


1/12/18 03:40 36.4 77 19 143/65 (91) 92 CPAP  


 


1/12/18 01:57  80   91   75


 


1/12/18 01:56  81 20  91 BiPAP/CPAP  75


 


1/12/18 01:29     91 BiPAP  


 


1/12/18 00:00     91 BiPAP  


 


1/11/18 23:33 37.0 80 21 164/73 (103) 89 High Flow Oxygen  


 


1/11/18 23:32  71   91   75


 


1/11/18 20:10    176/93 (120)    


 


1/11/18 20:00     92 High Flow Oxygen  


 


1/11/18 19:06  89 22  93 Nasal Cannula 50.0 75


 


1/11/18 18:45 37.4 81 28 190/137 (154) 90 High Flow Oxygen  





    202/93 (129)    








Laboratory Results 24 Hours:











Test


  1/12/18


05:57


 


Prothromb Time International


Ratio 2.2 


 


 


Prothrombin Time 22.4 SECONDS 











Assessment & Plan


Assessment:


Left Bimalleolar ankle fracture


Plan:


Discussed prognosis and current treatment plan for patient with Arvin KEN.  Pt and family have agreed on Palliative care at this time.  No surgery 

planned due to pulmonary status.  


Given the patients current medical status, collectively, we have decided to 

proceed with bedside, intra-articular injection with 1% lidocaine, closed 

reduction of the fracture with cast application.  I discussed the plan with the 

patient, risks include, nonunion, malunion, need for surgery, chronic pain, 

injury to nerves, vessels, bone, soft tissue.  The patient is agreeable with 

the plan and informed consent was obtained at this time.

## 2018-01-12 NOTE — DIAGNOSTIC IMAGING REPORT
L ANKLE 2 VIEWS



CLINICAL HISTORY: Left ankle fracture.   



COMPARISON STUDY:  None.



FINDINGS: Total fluoroscopy time is 18 seconds. 2 fluoroscopic spot images of

the left ankle. Overlying cast material obscures fine bony detail.



There is near-anatomic alignment of the trimalleolar left ankle fracture.



IMPRESSION:  Fluoroscopy provided for closed reduction of a left ankle

trimalleolar fracture. 









Electronically signed by:  Paul Herrera M.D.

1/12/2018 5:19 PM



Dictated Date/Time:  1/12/2018 5:18 PM

## 2018-01-13 VITALS — OXYGEN SATURATION: 94 % | HEART RATE: 76 BPM

## 2018-01-13 VITALS
OXYGEN SATURATION: 90 % | TEMPERATURE: 97.88 F | SYSTOLIC BLOOD PRESSURE: 158 MMHG | HEART RATE: 81 BPM | DIASTOLIC BLOOD PRESSURE: 72 MMHG

## 2018-01-13 VITALS
DIASTOLIC BLOOD PRESSURE: 80 MMHG | HEART RATE: 67 BPM | OXYGEN SATURATION: 93 % | TEMPERATURE: 98.78 F | SYSTOLIC BLOOD PRESSURE: 168 MMHG

## 2018-01-13 VITALS — HEART RATE: 73 BPM | OXYGEN SATURATION: 93 %

## 2018-01-13 VITALS — OXYGEN SATURATION: 95 % | HEART RATE: 74 BPM

## 2018-01-13 VITALS
OXYGEN SATURATION: 95 % | SYSTOLIC BLOOD PRESSURE: 147 MMHG | DIASTOLIC BLOOD PRESSURE: 73 MMHG | TEMPERATURE: 98.42 F | HEART RATE: 83 BPM

## 2018-01-13 VITALS
HEART RATE: 89 BPM | DIASTOLIC BLOOD PRESSURE: 75 MMHG | SYSTOLIC BLOOD PRESSURE: 181 MMHG | OXYGEN SATURATION: 98 % | TEMPERATURE: 98.6 F

## 2018-01-13 VITALS — OXYGEN SATURATION: 90 % | HEART RATE: 75 BPM

## 2018-01-13 VITALS — HEART RATE: 76 BPM | OXYGEN SATURATION: 92 %

## 2018-01-13 VITALS
SYSTOLIC BLOOD PRESSURE: 161 MMHG | OXYGEN SATURATION: 94 % | HEART RATE: 77 BPM | TEMPERATURE: 98.78 F | DIASTOLIC BLOOD PRESSURE: 95 MMHG

## 2018-01-13 VITALS — DIASTOLIC BLOOD PRESSURE: 64 MMHG | SYSTOLIC BLOOD PRESSURE: 154 MMHG

## 2018-01-13 LAB
BUN SERPL-MCNC: 29 MG/DL (ref 7–18)
CALCIUM SERPL-MCNC: 8.3 MG/DL (ref 8.5–10.1)
CO2 SERPL-SCNC: 43 MMOL/L (ref 21–32)
CREAT SERPL-MCNC: 0.55 MG/DL (ref 0.6–1.2)
GLUCOSE SERPL-MCNC: 177 MG/DL (ref 70–99)
INR PPP: 3.1 (ref 0.9–1.1)
POTASSIUM SERPL-SCNC: 3.1 MMOL/L (ref 3.5–5.1)
SODIUM SERPL-SCNC: 134 MMOL/L (ref 136–145)

## 2018-01-13 RX ADMIN — INSULIN ASPART SCH UNITS: 100 INJECTION, SOLUTION INTRAVENOUS; SUBCUTANEOUS at 08:26

## 2018-01-13 RX ADMIN — INSULIN ASPART SCH UNITS: 100 INJECTION, SOLUTION INTRAVENOUS; SUBCUTANEOUS at 21:10

## 2018-01-13 RX ADMIN — LEVALBUTEROL HYDROCHLORIDE SCH MG: 1.25 SOLUTION RESPIRATORY (INHALATION) at 03:12

## 2018-01-13 RX ADMIN — MIRTAZAPINE SCH MG: 15 TABLET, FILM COATED ORAL at 21:07

## 2018-01-13 RX ADMIN — FUROSEMIDE SCH MG: 20 TABLET ORAL at 07:43

## 2018-01-13 RX ADMIN — STANDARDIZED SENNA CONCENTRATE AND DOCUSATE SODIUM SCH TAB: 8.6; 5 TABLET ORAL at 07:44

## 2018-01-13 RX ADMIN — SIMVASTATIN SCH MG: 20 TABLET, FILM COATED ORAL at 21:07

## 2018-01-13 RX ADMIN — ACETYLCYSTEINE SCH ML: 200 SOLUTION ORAL; RESPIRATORY (INHALATION) at 19:07

## 2018-01-13 RX ADMIN — LATANOPROST SCH DROPS: 50 SOLUTION/ DROPS OPHTHALMIC at 21:12

## 2018-01-13 RX ADMIN — METHYLPREDNISOLONE SODIUM SUCCINATE SCH MLS/MIN: 1 INJECTION, POWDER, FOR SOLUTION INTRAMUSCULAR; INTRAVENOUS at 07:44

## 2018-01-13 RX ADMIN — LEVALBUTEROL HYDROCHLORIDE SCH MG: 1.25 SOLUTION RESPIRATORY (INHALATION) at 19:06

## 2018-01-13 RX ADMIN — CITALOPRAM HYDROBROMIDE SCH MG: 40 TABLET ORAL at 07:44

## 2018-01-13 RX ADMIN — ACETYLCYSTEINE SCH ML: 200 SOLUTION ORAL; RESPIRATORY (INHALATION) at 06:56

## 2018-01-13 RX ADMIN — PANTOPRAZOLE SCH MG: 40 TABLET, DELAYED RELEASE ORAL at 07:43

## 2018-01-13 RX ADMIN — LISINOPRIL SCH MG: 10 TABLET ORAL at 21:08

## 2018-01-13 RX ADMIN — LISINOPRIL SCH MG: 10 TABLET ORAL at 07:45

## 2018-01-13 RX ADMIN — MESALAMINE SCH MG: 250 CAPSULE ORAL at 07:43

## 2018-01-13 RX ADMIN — WARFARIN SODIUM SCH MG: 5 TABLET ORAL at 17:35

## 2018-01-13 RX ADMIN — DILTIAZEM HYDROCHLORIDE SCH MG: 240 CAPSULE, EXTENDED RELEASE ORAL at 07:45

## 2018-01-13 RX ADMIN — LEVALBUTEROL HYDROCHLORIDE SCH MG: 1.25 SOLUTION RESPIRATORY (INHALATION) at 06:56

## 2018-01-13 RX ADMIN — MORPHINE SULFATE PRN MG: 2 INJECTION, SOLUTION INTRAMUSCULAR; INTRAVENOUS at 14:56

## 2018-01-13 RX ADMIN — INSULIN ASPART SCH UNITS: 100 INJECTION, SOLUTION INTRAVENOUS; SUBCUTANEOUS at 17:38

## 2018-01-13 RX ADMIN — INSULIN ASPART SCH UNITS: 100 INJECTION, SOLUTION INTRAVENOUS; SUBCUTANEOUS at 13:26

## 2018-01-13 RX ADMIN — LEVALBUTEROL HYDROCHLORIDE SCH MG: 1.25 SOLUTION RESPIRATORY (INHALATION) at 14:26

## 2018-01-13 RX ADMIN — MESALAMINE SCH MG: 250 CAPSULE ORAL at 21:07

## 2018-01-13 NOTE — ORTHOPEDIC PROGRESS NOTE
Orthopedic Progress Note


Date of Service


Jan 13, 2018.





Subjective


Reports: feeling well, pain controlled w PO medications, Denies: complaints, 

chest pain, SOB, nausea / vomiting, light headedness, calf pain





Objective


calves soft nontender, N/V intact, capillary refill less than 2 sec., A&O x3


Cast in good position. Toes were mobile. Neurovascularly intact. No swelling 

noted.











  Date Time  Temp Pulse Resp B/P (MAP) Pulse Ox O2 Delivery O2 Flow Rate FiO2


 


1/13/18 07:00  76   94   85


 


1/13/18 06:59  76 20  94 BiPAP/CPAP  85


 


1/13/18 05:47  74   95   85


 


1/13/18 05:30 37.1 77 19 161/95 (117) 94 BiPAP  


 


1/13/18 04:00      Oxymask 10.0 


 


1/13/18 03:14  73 22  93 BiPAP/CPAP  85


 


1/13/18 03:14  74   93   85


 


1/13/18 00:03 37.1 67 29 168/80 (109) 93 BiPAP  





  67      


 


1/13/18 00:00      Oxymask 10.0 


 


1/12/18 22:37  77   90   85


 


1/12/18 20:00      High Flow Oxygen  


 


1/12/18 19:00 37.0 82 20 159/83 (108) 92 High Flow Oxygen  


 


1/12/18 18:57  80 18  92 Nasal Cannula 50.0 78


 


1/12/18 16:00      High Flow Oxygen  


 


1/12/18 15:17 36.9 77 20 146/70 (95) 94 Room Air  


 


1/12/18 14:09  78 22  92 Nasal Cannula 50.0 77


 


1/12/18 12:45 36.4 83 19 186/85 (118) 94 BiPAP 15.0 


 


1/12/18 12:00      High Flow Oxygen  








Laboratory Results 24 Hours:











Test


  1/13/18


06:43


 


Prothromb Time International


Ratio 3.1 


 


 


Prothrombin Time 32.1 SECONDS 











Assessment & Plan


Assessment:


Left Bimalleolar ankle fracture S/P Closed reduction.


Plan:


Discussed prognosis and current treatment plan for patient with Arvin KEN.  Pt and family have agreed on Palliative care at this time.  No surgery 

planned due to pulmonary status.  





Cast is intact and patient is neurovascularly intact. At this time we will sign 

off on the patient. If there is any questions or concerns please consult.

## 2018-01-13 NOTE — PULMONOLOGY PROGRESS NOTE
Pulmonary Progress Note


Date of Service


Jan 13, 2018.





Attending


Dr. Marquis





Subjective


Patient reports that she slept very very well last night. She has no 

significant shortness of breath with the high flow O2 on at this time. She 

tolerated BiPAP last night but again reports that she does not like using it. 

She is looking forward to getting out of bed to chair. Significant improvement 

in anxiety. Continues with excellent demeanor





No fever, chills, sweats, rigors. No chest pain or tightness. Pain in left 

ankle is controlled. No noticeable swelling in left foot per patient. No other 

acute complaints.





Objective








Vital Signs - as noted below





Laboratory Data - as noted below





Physical Exam:


General - NAD. Pleasant


Eyes - No icterus, gaze conjugate


ENT - Mucosa moist, no lesions or candidiasis


Neck - Supple, No JVD


Lungs - No bronchospasm, rales, or rhonchi. Persistent decreased breath sounds 

globally


Heart - Regular, rate controlled


Abdomen - Soft, NT, ND, BS present 


Extremities - No edema, pedal pulse to right foot. Unable to palpate pulses in 

left foot as cast is now in place and no significant swelling of left toes. 

Patient able to wiggle toes. No evidence of cyanosis to left forefoot.


Neuro - A&OX3











Labs: 


   ABG 1/8/2018 -  7.44/70/79/46/94.6% on 70% FiO2 BiPAP


   ABG 01/06/2018--7.41/63/71/39/92.1% on 70% FiO2 BiPAP


   ABG 01/05/2018--7.27/80/116/36/96.7% on high flow nasal cannula


   


Imaging reviewed. 








Assessment & Plan


LEFT BILATERAL MALLEOLI OR FRACTURE


* Left ankle put in position of function by orthopedics last evening


* Cast is now in place


* Evidence of significant swelling or cyanosis of the left forefoot


* Unable to palpate left foot pulse secondary to cast


* Patient will move toes. Pain well-controlled


* Need physical therapy


* Toe-touch weightbearing on the left leg





ACUTE ON CHRONIC RESPIRATORY FAILURE


* Patient continues to be dependent on BiPAP at night and high flow O2 during 

the day


* Will work today to get patient from high flow over to Oxymask


* Goal is to keep patient between 88 and 92% on 10 L/m or less of supplemental 

oxygen


* Completed antibiotic course


* Discontinue methylprednisolone. Start Prednisone taper. 40 milligrams PO 

every morning started for tomorrow


* Symptomatically improved


* Still high O2 requirements





DVT PROPHYLAXIS


* Patient on Coumadin for atrial fibrillation. INR 3.1


* Out of bed to chair


* Physical therapy





Thank you for including us in the care of this patient. He is referred to Dr. Marquis's addendum for further recommendations





We'll sign off at this time from a pulmonary standpoint. Goal is to get patient 

home for home health / hospice and end-of-life care.





Please feel free to reconsult as needed





Data


Medications:





Current Inpatient Medications








 Medications


  (Trade)  Dose


 Ordered  Sig/Donna


 Route  Start Time


 Stop Time Status Last Admin


Dose Admin


 


 Levalbuterol


  (Xopenex 1.25MG/


 3ML Neb)  1.25 mg  Q6R


 INH  1/1/18 22:30


 1/31/18 22:29  1/13/18 06:56


1.25 MG


 


 Tramadol HCl


  (Ultram Tab)  50 mg  Q6H  PRN


 PO  1/1/18 21:30


 1/31/18 21:29  1/11/18 09:02


50 MG


 


 Clonidine HCl


  (Catapres Tab)  0.1 mg  Q6H  PRN


 PO  1/1/18 21:30


 1/31/18 21:29 Future Hold 1/3/18 16:21


0.1 MG


 


 Citalopram


 Hydrobromide


  (celeXA TAB)  40 mg  DAILY


 PO  1/2/18 09:00


 2/1/18 08:59  1/13/18 07:44


40 MG


 


 Latanoprost


  (Xalatan Oph


 Soln)  1 drops  HS


 OP  1/2/18 21:00


 2/1/18 20:59  1/12/18 20:47


1 DROPS


 


 Mesalamine


  (Pentasa


 Controlled Rel


 Cap)  1,000 mg  BID


 PO  1/2/18 09:00


 2/1/18 08:59  1/13/18 07:43


1,000 MG


 


 Mirtazapine


  (Remeron Tab)  15 mg  HS


 PO  1/2/18 21:00


 2/1/18 20:59  1/12/18 20:48


15 MG


 


 Pantoprazole


 Sodium


  (Protonix Tab)  40 mg  DAILY


 PO  1/2/18 09:00


 2/1/18 08:59  1/13/18 07:43


40 MG


 


 Senna/Docusate


 Sodium


  (Senokot S Tab)  1 tab  DAILY


 PO  1/2/18 09:00


 2/1/18 08:59  1/13/18 07:44


1 TAB


 


 Simvastatin


  (Zocor Tab)  20 mg  HS


 PO  1/2/18 21:00


 2/1/18 20:59  1/12/18 20:48


20 MG


 


 Acetaminophen


  (Tylenol Tab)  650 mg  Q4H  PRN


 PO  1/1/18 21:45


 1/31/18 21:44  1/10/18 17:23


650 MG


 


 Ondansetron HCl


  (Zofran Inj)  4 mg  Q6H  PRN


 IV  1/1/18 21:45


 1/31/18 21:44   


 


 


 Heparin Sodium/


 Dextrose  500 ml @ 


 25 mls/hr  Q20H PRN


 IV  1/6/18 08:30


 2/5/18 08:29 Future Hold 1/9/18 18:22


25 MLS/HR


 


 Methylprednisolone


 Sodium Succinate


 40 mg/Syringe  0.64 ml @ 


 1.5 mls/min  Q12H


 IV  1/6/18 20:00


 2/5/18 19:59  1/13/18 07:44


1.5 MLS/MIN


 


 Acetylcysteine


  (Mucomyst 20%


 Inh Soln)  3 ml  BIDR


 INH  1/6/18 20:00


 2/5/18 19:59  1/13/18 06:56


3 ML


 


 Diltiazem HCl


  (Cardizem Cd Cap)  240 mg  QAM


 PO  1/7/18 09:00


 2/5/18 08:59  1/13/18 07:45


240 MG


 


 Insulin Aspart


  (novoLOG ASPART)  **SLIDING


 SCALE**


 **G...  ACHS


 SC  1/9/18 21:00


 2/8/18 20:59  1/13/18 08:26


4 UNITS


 


 Glucose


  (Glucose 40% Gel)  15-30


 GRAMS 15


 GRAMS...  UD  PRN


 PO  1/9/18 17:15


 2/8/18 17:14   


 


 


 Glucose


  (Glucose Chew


 Tab)  4-8


 Tablets 4


 Tabl...  UD  PRN


 PO  1/9/18 17:15


 2/8/18 17:14   


 


 


 Dextrose


  (Dextrose 50%


 50ML Syringe)  25-50ML OF


 50% DW IV


 FOR...  UD  PRN


 IV  1/9/18 17:15


 2/8/18 17:14   


 


 


 Glucagon


  (Glucagon Inj)  1 mg  UD  PRN


 SQ  1/9/18 17:15


 2/8/18 17:14   


 


 


 Hydralazine HCl


  (HydrALAZINE INJ)  10 mg  Q6H  PRN


 IV.  1/9/18 20:00


 2/8/18 19:59  1/11/18 16:58


10 MG


 


 Warfarin Sodium


  (Coumadin Tab)  5 mg  DAILY@16


 PO  1/10/18 16:00


 2/9/18 15:59  1/12/18 17:19


5 MG


 


 Lisinopril


  (Zestril Tab)  10 mg  BID


 PO  1/10/18 21:00


 2/9/18 08:59  1/13/18 07:45


10 MG


 


 Furosemide


  (Lasix Tab)  20 mg  QAM


 PO  1/11/18 09:00


 2/10/18 08:59  1/13/18 07:43


20 MG


 


 Morphine Sulfate


  (MoRPHine


 SULFATE INJ)  1 mg  Q4H  PRN


 IV  1/11/18 11:30


 1/25/18 11:29  1/12/18 22:55


1 MG








Vital Signs:











  Date Time  Temp Pulse Resp B/P (MAP) Pulse Ox O2 Delivery O2 Flow Rate FiO2


 


1/13/18 07:00  76   94   85


 


1/13/18 06:59  76 20  94 BiPAP/CPAP  85


 


1/13/18 05:47  74   95   85


 


1/13/18 05:30 37.1 77 19 161/95 (117) 94 BiPAP  


 


1/13/18 04:00      Oxymask 10.0 


 


1/13/18 03:14  73 22  93 BiPAP/CPAP  85


 


1/13/18 03:14  74   93   85


 


1/13/18 00:03 37.1 67 29 168/80 (109) 93 BiPAP  





  67      


 


1/13/18 00:00      Oxymask 10.0 


 


1/12/18 22:37  77   90   85


 


1/12/18 20:00      High Flow Oxygen  


 


1/12/18 19:00 37.0 82 20 159/83 (108) 92 High Flow Oxygen  


 


1/12/18 18:57  80 18  92 Nasal Cannula 50.0 78


 


1/12/18 16:00      High Flow Oxygen  


 


1/12/18 15:17 36.9 77 20 146/70 (95) 94 Room Air  


 


1/12/18 14:09  78 22  92 Nasal Cannula 50.0 77


 


1/12/18 12:45 36.4 83 19 186/85 (118) 94 BiPAP 15.0 


 


1/12/18 12:00      High Flow Oxygen  








Laboratory Results:





Last 24 Hours








Test


  1/12/18


11:33 1/12/18


16:27 1/12/18


20:09 1/13/18


06:43


 


Bedside Glucose 210 mg/dl  197 mg/dl  248 mg/dl  


 


Prothrombin Time    32.1 SECONDS 


 


Prothromb Time International


Ratio 


  


  


  3.1 


 


 


Sodium Level    134 mmol/L 


 


Potassium Level    3.1 mmol/L 


 


Chloride Level    89 mmol/L 


 


Carbon Dioxide Level    43 mmol/L 


 


Anion Gap    2.0 mmol/L 


 


Blood Urea Nitrogen    29 mg/dl 


 


Creatinine    0.55 mg/dl 


 


Est Creatinine Clear Calc


Drug Dose 


  


  


  94.5 ml/min 


 


 


Estimated GFR (


American) 


  


  


  101.2 


 


 


Estimated GFR (Non-


American 


  


  


  87.4 


 


 


BUN/Creatinine Ratio    51.7 


 


Random Glucose    177 mg/dl 


 


Calcium Level    8.3 mg/dl 


 


Test


  1/13/18


06:45 


  


  


 


 


Bedside Glucose 175 mg/dl

## 2018-01-13 NOTE — PROGRESS NOTE
Internal Med Progress Note


Date of Service:


Jan 13, 2018.


Provider Documentation:





SUBJECTIVE:





The patient was seen and examined 


Clinically stable and denies any symptoms


Has had discussion with the Daughter,Pulmonary and palliative care 


Has had ankle brace 


Trying to decreased the flow of Oxygen before discharge








OBJECTIVE:





Vital Signs-as noted below





Exam:


General-Minimal distress at rest


Eyes-normal


ENT-normal


Neck-supple


Lungs-Decreased breath sound bilaterally 


Minimal wheezing bilaterally 


Heart-Regular,no murmur 


Abdomen-Benign,no masses,bowel sound present 


Extremities-Trace edema bilaterally 


Neuro-AAOx3





Lab data as noted below.


ASSESSMENT & PLAN:








Acute hypercapnic respiratory failure 


Secondary to COPD exacerbation and complicated by Pneumonia


CXR showed dense bibasilar airspace consolidation, right greater than left with 

associated pleural effusions.


ABG on admission showed respiratory acidosis with comp


Has been on  Levaquin and Zosyn


Negative influenza test 


Continue Solumedrol


Continue BIPAP with intermittent  high flow supplement oxygen 


Blood cx no growth


CT with PE protocol showed no evidence for PE. Dense bilateral lower lobe 

atelectasis/consolidation. Patchy airspace


opacities are also present within the right upper lobe lingula and right middle 

lobe.


Advised to USE BIPAP as much as she can to improve CO2 level and improve 

drowsiness


Lasix 40mg BID ot help fluid overload 


IV Vancomycin added to broaden the spectrum -discontinued


ECHO


* The echocardiogram is extremely technically limited.


* Patient was sitting upright for the test.


* There is no significant pericardial effusion noted.


* The left ventricular systolic function is grossly normal on limited 

visualization.


* The right ventricular systolic function is grossly normal.


* The valves are poorly visualized.


* There is no significant valvular stenosis or regurgitation on technically 

limited Doppler evaluation.


Continue current antibiotics for 3 more days 


Appreciate Pulmonary evaluation 


After a long discussion with the Daughter,palliative care and pulmonary ,

nonsurgical approach is taken


Wean of the High Flow Oxygen and maintain saturation >85%


Preparing to send her home with face musk Oxygen delivery


Clinically stable 


Trying to reduce the flow rate Oxygen before discharge








Paroxysmal atrial flutter with rapid ventricular response


Due to acute hypercapnic respiratory failure


Received Cardizem and digoxin


Continue on heparin drip till therapeutic with Coumadin 


Continue Cardizem 240mg


Rate is controlled and denies any symptoms No acute issue 





Hyponatremia


Possible related to hypovolemia  


Na on admission 128


Na today 134


Improved to normal








Hypokalemia ,admitted with Hyperkalemia


Lisinopril was on hold and leter on developed Hypo\kalemia


Getting Supplement 


Lisinopril is restarted and diet changed 


Will continue to monitor


Will keep supplementing





Left Ankle fracture s/p fall 


Left ankle xray showed distal tibial and fibular fractures as above with mild 

lateral subluxation of the talus.


Ortho on board


Continue required high flow oxygen and BIPAP for acute hypercapnic respiratory 

failure 


Continue to postpone surgery until respiratory status improves


Likely to have Conservative management of the Ankle


Surgery will carry a higher risk 


The issue was discussed in detailed with the Daughter and the patient


WILL NEED SURGERY FOR THE ANKLE FRACTURE AS PER ORTHO 


No surgery =will get appropriate brace before discharge


Ankle brace provided 





Right pelvic ring fracture, 


Pelvis xray showed an age indeterminant right pubic ring fracture, possibly 

chronic.


Stable








Hypertension


BP elevated 


Continue lisinopril.  


will add amlodipine


PRN clonidine


Continue monitor BP








Ulcerative colitis


On Pentasa


Stable





Depression


On Celexa


Stable





DVT px 


On heparin drip





CODE STATUS DNR 





DISPOSITION


Continue monitor in tele


Consultants:


Pulmonary


Probable discharge on Monday





Vital Signs:











  Date Time  Temp Pulse Resp B/P (MAP) Pulse Ox O2 Delivery O2 Flow Rate FiO2


 


1/13/18 14:26  75 18  90 Mask 10.0 


 


1/13/18 10:46 36.9 83 20 147/73 (97) 95 CPAP  


 


1/13/18 08:00      High Flow Oxygen  


 


1/13/18 07:00  76   94   85


 


1/13/18 06:59  76 20  94 BiPAP/CPAP  85


 


1/13/18 05:47  74   95   85


 


1/13/18 05:30 37.1 77 19 161/95 (117) 94 BiPAP  


 


1/13/18 04:00      Oxymask 10.0 


 


1/13/18 03:14  73 22  93 BiPAP/CPAP  85


 


1/13/18 03:14  74   93   85


 


1/13/18 00:03 37.1 67 29 168/80 (109) 93 BiPAP  





  67      


 


1/13/18 00:00      Oxymask 10.0 


 


1/12/18 22:37  77   90   85


 


1/12/18 20:00      High Flow Oxygen  


 


1/12/18 19:00 37.0 82 20 159/83 (108) 92 High Flow Oxygen  


 


1/12/18 18:57  80 18  92 Nasal Cannula 50.0 78


 


1/12/18 16:00      High Flow Oxygen  


 


1/12/18 15:17 36.9 77 20 146/70 (95) 94 Room Air  








Lab Results:





Results Past 24 Hours








Test


  1/12/18


16:27 1/12/18


20:09 1/13/18


06:43 1/13/18


06:45 Range/Units


 


 


Bedside Glucose 197 248  175 70-90  mg/dl


 


Prothrombin Time


  


  


  32.1


  


  9.0-12.0


SECONDS


 


Prothromb Time International


Ratio 


  


  3.1


  


  0.9-1.1  


 


 


Sodium Level   134  136-145  mmol/L


 


Potassium Level   3.1  3.5-5.1  mmol/L


 


Chloride Level   89    mmol/L


 


Carbon Dioxide Level   43  21-32  mmol/L


 


Anion Gap   2.0  3-11  mmol/L


 


Blood Urea Nitrogen   29  7-18  mg/dl


 


Creatinine


  


  


  0.55


  


  0.60-1.20


mg/dl


 


Est Creatinine Clear Calc


Drug Dose 


  


  94.5


  


   ml/min


 


 


Estimated GFR (


American) 


  


  101.2


  


   


 


 


Estimated GFR (Non-


American 


  


  87.4


  


   


 


 


BUN/Creatinine Ratio   51.7  10-20  


 


Random Glucose   177  70-99  mg/dl


 


Calcium Level   8.3  8.5-10.1  mg/dl


 


Test


  1/13/18


10:50 


  


  


  Range/Units


 


 


Bedside Glucose 194    70-90  mg/dl

## 2018-01-14 VITALS
TEMPERATURE: 98.6 F | HEART RATE: 83 BPM | DIASTOLIC BLOOD PRESSURE: 62 MMHG | OXYGEN SATURATION: 89 % | SYSTOLIC BLOOD PRESSURE: 166 MMHG

## 2018-01-14 VITALS
SYSTOLIC BLOOD PRESSURE: 172 MMHG | DIASTOLIC BLOOD PRESSURE: 73 MMHG | HEART RATE: 76 BPM | TEMPERATURE: 98.06 F | OXYGEN SATURATION: 90 %

## 2018-01-14 VITALS
OXYGEN SATURATION: 92 % | TEMPERATURE: 98.6 F | DIASTOLIC BLOOD PRESSURE: 102 MMHG | SYSTOLIC BLOOD PRESSURE: 177 MMHG | HEART RATE: 75 BPM

## 2018-01-14 VITALS
TEMPERATURE: 98.06 F | HEART RATE: 68 BPM | DIASTOLIC BLOOD PRESSURE: 42 MMHG | OXYGEN SATURATION: 88 % | SYSTOLIC BLOOD PRESSURE: 132 MMHG

## 2018-01-14 VITALS — OXYGEN SATURATION: 93 % | HEART RATE: 83 BPM

## 2018-01-14 VITALS
OXYGEN SATURATION: 94 % | HEART RATE: 85 BPM | DIASTOLIC BLOOD PRESSURE: 56 MMHG | TEMPERATURE: 98.42 F | SYSTOLIC BLOOD PRESSURE: 175 MMHG

## 2018-01-14 VITALS
SYSTOLIC BLOOD PRESSURE: 177 MMHG | HEART RATE: 74 BPM | DIASTOLIC BLOOD PRESSURE: 75 MMHG | OXYGEN SATURATION: 90 % | TEMPERATURE: 97.88 F

## 2018-01-14 VITALS — OXYGEN SATURATION: 90 % | HEART RATE: 72 BPM

## 2018-01-14 VITALS
SYSTOLIC BLOOD PRESSURE: 152 MMHG | HEART RATE: 85 BPM | OXYGEN SATURATION: 86 % | TEMPERATURE: 98.06 F | DIASTOLIC BLOOD PRESSURE: 68 MMHG

## 2018-01-14 VITALS — HEART RATE: 75 BPM | OXYGEN SATURATION: 96 %

## 2018-01-14 VITALS — HEART RATE: 78 BPM | OXYGEN SATURATION: 92 %

## 2018-01-14 VITALS — HEART RATE: 74 BPM | OXYGEN SATURATION: 89 %

## 2018-01-14 VITALS — OXYGEN SATURATION: 93 % | HEART RATE: 87 BPM

## 2018-01-14 LAB — INR PPP: 4.6 (ref 0.9–1.1)

## 2018-01-14 RX ADMIN — LEVALBUTEROL HYDROCHLORIDE SCH MG: 1.25 SOLUTION RESPIRATORY (INHALATION) at 14:18

## 2018-01-14 RX ADMIN — WARFARIN SODIUM SCH MG: 5 TABLET ORAL at 16:35

## 2018-01-14 RX ADMIN — MIRTAZAPINE SCH MG: 15 TABLET, FILM COATED ORAL at 22:30

## 2018-01-14 RX ADMIN — PANTOPRAZOLE SCH MG: 40 TABLET, DELAYED RELEASE ORAL at 07:34

## 2018-01-14 RX ADMIN — FUROSEMIDE SCH MG: 20 TABLET ORAL at 07:35

## 2018-01-14 RX ADMIN — LATANOPROST SCH DROPS: 50 SOLUTION/ DROPS OPHTHALMIC at 22:28

## 2018-01-14 RX ADMIN — MESALAMINE SCH MG: 250 CAPSULE ORAL at 22:29

## 2018-01-14 RX ADMIN — SIMVASTATIN SCH MG: 20 TABLET, FILM COATED ORAL at 22:30

## 2018-01-14 RX ADMIN — INSULIN ASPART SCH UNITS: 100 INJECTION, SOLUTION INTRAVENOUS; SUBCUTANEOUS at 12:59

## 2018-01-14 RX ADMIN — INSULIN ASPART SCH UNITS: 100 INJECTION, SOLUTION INTRAVENOUS; SUBCUTANEOUS at 19:00

## 2018-01-14 RX ADMIN — INSULIN ASPART SCH UNITS: 100 INJECTION, SOLUTION INTRAVENOUS; SUBCUTANEOUS at 07:50

## 2018-01-14 RX ADMIN — LEVALBUTEROL HYDROCHLORIDE SCH MG: 1.25 SOLUTION RESPIRATORY (INHALATION) at 19:10

## 2018-01-14 RX ADMIN — MESALAMINE SCH MG: 250 CAPSULE ORAL at 07:34

## 2018-01-14 RX ADMIN — LISINOPRIL SCH MG: 10 TABLET ORAL at 22:29

## 2018-01-14 RX ADMIN — LISINOPRIL SCH MG: 10 TABLET ORAL at 07:34

## 2018-01-14 RX ADMIN — INSULIN ASPART SCH UNITS: 100 INJECTION, SOLUTION INTRAVENOUS; SUBCUTANEOUS at 23:18

## 2018-01-14 RX ADMIN — CITALOPRAM HYDROBROMIDE SCH MG: 40 TABLET ORAL at 07:34

## 2018-01-14 RX ADMIN — LEVALBUTEROL HYDROCHLORIDE SCH MG: 1.25 SOLUTION RESPIRATORY (INHALATION) at 06:59

## 2018-01-14 RX ADMIN — MORPHINE SULFATE PRN MG: 2 INJECTION, SOLUTION INTRAMUSCULAR; INTRAVENOUS at 17:18

## 2018-01-14 RX ADMIN — ACETYLCYSTEINE SCH ML: 200 SOLUTION ORAL; RESPIRATORY (INHALATION) at 19:10

## 2018-01-14 RX ADMIN — ACETYLCYSTEINE SCH ML: 200 SOLUTION ORAL; RESPIRATORY (INHALATION) at 06:58

## 2018-01-14 RX ADMIN — LEVALBUTEROL HYDROCHLORIDE SCH MG: 1.25 SOLUTION RESPIRATORY (INHALATION) at 01:45

## 2018-01-14 RX ADMIN — DILTIAZEM HYDROCHLORIDE SCH MG: 240 CAPSULE, EXTENDED RELEASE ORAL at 07:35

## 2018-01-14 RX ADMIN — STANDARDIZED SENNA CONCENTRATE AND DOCUSATE SODIUM SCH TAB: 8.6; 5 TABLET ORAL at 07:34

## 2018-01-14 NOTE — PROGRESS NOTE
Internal Med Progress Note


Date of Service:


Jan 14, 2018.


Provider Documentation:





SUBJECTIVE:





The patient was seen and examined 


Clinically stable and denies any symptoms


Has had discussion with the Daughter,Pulmonary and palliative care 


Has had ankle brace 


Trying to decreased the flow of Oxygen before discharge


OOB in a chair and denies any symptoms


Wants to go home 








OBJECTIVE:





Vital Signs-as noted below





Exam:


General-Minimal distress at rest with SOB


Eyes-normal


ENT-normal


Neck-supple


Lungs-Decreased breath sound bilaterally 


Minimal wheezing bilaterally 


Heart-Regular,no murmur 


Abdomen-Benign,no masses,bowel sound present 


Extremities-Trace edema bilaterally ,left ankle is in brace


Neuro-AAOx3





Lab data as noted below.


ASSESSMENT & PLAN:








Acute hypercapnic respiratory failure 


Secondary to COPD exacerbation and complicated by Pneumonia


CXR showed dense bibasilar airspace consolidation, right greater than left with 

associated pleural effusions.


ABG on admission showed respiratory acidosis with comp


Has been on  Levaquin and Zosyn


Negative influenza test 


Continue Solumedrol


Continue BIPAP with intermittent  high flow supplement oxygen 


Blood cx no growth


CT with PE protocol showed no evidence for PE. Dense bilateral lower lobe 

atelectasis/consolidation. Patchy airspace


opacities are also present within the right upper lobe lingula and right middle 

lobe.


Advised to USE BIPAP as much as she can to improve CO2 level and improve 

drowsiness


Lasix 40mg BID ot help fluid overload 


IV Vancomycin added to broaden the spectrum -discontinued


ECHO


* The echocardiogram is extremely technically limited.


* Patient was sitting upright for the test.


* There is no significant pericardial effusion noted.


* The left ventricular systolic function is grossly normal on limited 

visualization.


* The right ventricular systolic function is grossly normal.


* The valves are poorly visualized.


* There is no significant valvular stenosis or regurgitation on technically 

limited Doppler evaluation.


Continue current antibiotics for 3 more days 


Appreciate Pulmonary evaluation 


After a long discussion with the Daughter,palliative care and pulmonary ,

nonsurgical approach is taken


Wean of the High Flow Oxygen and maintain saturation >85%


Preparing to send her home with face musk Oxygen delivery


Trying to reduce the flow rate Oxygen before discharge


Continue PT/OT -waiting to be reasonably mobile to be discharged 








Paroxysmal atrial flutter with rapid ventricular response


Due to acute hypercapnic respiratory failure


Received Cardizem and digoxin


Continue on heparin drip till therapeutic with Coumadin 


Continue Cardizem 240mg


Rate is controlled and denies any symptoms No acute issue 





Hyponatremia


Possible related to hypovolemia  


Na on admission 128


Na today 134


Improved to normal


check PRP and Mag on Monday








Hypokalemia ,admitted with Hyperkalemia


Lisinopril was on hold and leter on developed Hypo\kalemia


Getting Supplement 


Lisinopril is restarted and diet changed 


Will continue to monitor


Will keep supplementing





Left Ankle fracture s/p fall 


Left ankle xray showed distal tibial and fibular fractures as above with mild 

lateral subluxation of the talus.


Ortho on board


Continue required high flow oxygen and BIPAP for acute hypercapnic respiratory 

failure 


Continue to postpone surgery until respiratory status improves


Likely to have Conservative management of the Ankle


Surgery will carry a higher risk 


The issue was discussed in detailed with the Daughter and the patient


WILL NEED SURGERY FOR THE ANKLE FRACTURE AS PER ORTHO 


No surgery =will get appropriate brace before discharge


Ankle brace provided 





Right pelvic ring fracture, 


Pelvis xray showed an age indeterminant right pubic ring fracture, possibly 

chronic.


Stable








Hypertension


BP elevated 


Continue lisinopril.  


will add amlodipine


PRN clonidine


Continue monitor BP








Ulcerative colitis


On Pentasa


Stable-no acute issue





Depression


On Celexa


Stable





DVT px 


On heparin drip





CODE STATUS DNR 





DISPOSITION


Continue monitor in tele


Consultants:


Pulmonary


Probable discharge on Monday/Tuesday





Vital Signs:











  Date Time  Temp Pulse Resp B/P (MAP) Pulse Ox O2 Delivery O2 Flow Rate FiO2


 


1/14/18 07:05 36.6 74 24 177/75 (109) 90 Oxymask 10.0 


 


1/14/18 07:03  72 20  90 Mask 10.0 


 


1/14/18 04:01 37.0 75 30 177/102 (127) 92 Oxymask 6.0 


 


1/14/18 04:00      Oxymask 10.0 


 


1/14/18 01:46  75 18  96 BiPAP/CPAP  85


 


1/14/18 01:01  78   92   85


 


1/14/18 00:12 37.0 83 24 166/62 (96) 89 Room Air  


 


1/14/18 00:05      Oxymask 10.0 


 


1/13/18 20:30    154/64 (94)    


 


1/13/18 20:00      Oxymask 10.0 


 


1/13/18 19:24 37.0 89 26 181/75 (110) 98 High Flow Oxygen 8.0 


 


1/13/18 19:10  76 20  92 Mask 10.0 


 


1/13/18 16:00      Oxymask 10.0 


 


1/13/18 15:00 36.6 81 18 158/72 (100) 90 Oxymask 10.0 


 


1/13/18 14:26  75 18  90 Mask 10.0 








Lab Results:





Results Past 24 Hours








Test


  1/13/18


16:17 1/13/18


20:15 1/14/18


05:30 1/14/18


06:37 Range/Units


 


 


Bedside Glucose 285 186  144 70-90  mg/dl


 


Prothrombin Time


  


  


  46.4


  


  9.0-12.0


SECONDS


 


Prothromb Time International


Ratio 


  


  4.6


  


  0.9-1.1  


 


 


Test


  1/14/18


10:47 


  


  


  Range/Units


 


 


Bedside Glucose 152    70-90  mg/dl

## 2018-01-15 VITALS — HEART RATE: 82 BPM | SYSTOLIC BLOOD PRESSURE: 172 MMHG | OXYGEN SATURATION: 94 % | DIASTOLIC BLOOD PRESSURE: 68 MMHG

## 2018-01-15 VITALS
HEART RATE: 77 BPM | DIASTOLIC BLOOD PRESSURE: 91 MMHG | OXYGEN SATURATION: 93 % | TEMPERATURE: 98.6 F | SYSTOLIC BLOOD PRESSURE: 165 MMHG

## 2018-01-15 VITALS
HEART RATE: 79 BPM | DIASTOLIC BLOOD PRESSURE: 75 MMHG | OXYGEN SATURATION: 90 % | SYSTOLIC BLOOD PRESSURE: 136 MMHG | TEMPERATURE: 98.6 F

## 2018-01-15 VITALS — HEART RATE: 73 BPM | OXYGEN SATURATION: 95 %

## 2018-01-15 VITALS
SYSTOLIC BLOOD PRESSURE: 170 MMHG | TEMPERATURE: 98.24 F | DIASTOLIC BLOOD PRESSURE: 66 MMHG | OXYGEN SATURATION: 91 % | HEART RATE: 84 BPM

## 2018-01-15 VITALS — HEART RATE: 78 BPM | OXYGEN SATURATION: 91 %

## 2018-01-15 VITALS
HEART RATE: 81 BPM | SYSTOLIC BLOOD PRESSURE: 174 MMHG | OXYGEN SATURATION: 92 % | TEMPERATURE: 98.24 F | DIASTOLIC BLOOD PRESSURE: 69 MMHG

## 2018-01-15 VITALS
HEART RATE: 84 BPM | SYSTOLIC BLOOD PRESSURE: 155 MMHG | TEMPERATURE: 98.6 F | DIASTOLIC BLOOD PRESSURE: 88 MMHG | OXYGEN SATURATION: 87 %

## 2018-01-15 VITALS — OXYGEN SATURATION: 94 % | HEART RATE: 86 BPM

## 2018-01-15 VITALS
OXYGEN SATURATION: 91 % | TEMPERATURE: 98.06 F | HEART RATE: 87 BPM | DIASTOLIC BLOOD PRESSURE: 78 MMHG | SYSTOLIC BLOOD PRESSURE: 179 MMHG

## 2018-01-15 LAB
BUN SERPL-MCNC: 21 MG/DL (ref 7–18)
CALCIUM SERPL-MCNC: 8.1 MG/DL (ref 8.5–10.1)
CO2 SERPL-SCNC: 43 MMOL/L (ref 21–32)
CREAT SERPL-MCNC: 0.51 MG/DL (ref 0.6–1.2)
EOSINOPHIL NFR BLD AUTO: 252 K/UL (ref 130–400)
GLUCOSE SERPL-MCNC: 103 MG/DL (ref 70–99)
HCT VFR BLD CALC: 35.3 % (ref 37–47)
HGB BLD-MCNC: 11.2 G/DL (ref 12–16)
INR PPP: 4.2 (ref 0.9–1.1)
MCH RBC QN AUTO: 28.7 PG (ref 25–34)
MCHC RBC AUTO-ENTMCNC: 31.7 G/DL (ref 32–36)
MCV RBC AUTO: 90.5 FL (ref 80–100)
PHOSPHATE SERPL-MCNC: 1.9 MG/DL (ref 2.5–4.9)
PMV BLD AUTO: 9.8 FL (ref 7.4–10.4)
POTASSIUM SERPL-SCNC: 3.4 MMOL/L (ref 3.5–5.1)
RED CELL DISTRIBUTION WIDTH CV: 14.5 % (ref 11.5–14.5)
RED CELL DISTRIBUTION WIDTH SD: 47.2 FL (ref 36.4–46.3)
SODIUM SERPL-SCNC: 138 MMOL/L (ref 136–145)
WBC # BLD AUTO: 15.94 K/UL (ref 4.8–10.8)

## 2018-01-15 RX ADMIN — INSULIN ASPART SCH UNITS: 100 INJECTION, SOLUTION INTRAVENOUS; SUBCUTANEOUS at 21:34

## 2018-01-15 RX ADMIN — INSULIN ASPART SCH UNITS: 100 INJECTION, SOLUTION INTRAVENOUS; SUBCUTANEOUS at 08:50

## 2018-01-15 RX ADMIN — DILTIAZEM HYDROCHLORIDE SCH MG: 240 CAPSULE, EXTENDED RELEASE ORAL at 08:47

## 2018-01-15 RX ADMIN — PANTOPRAZOLE SCH MG: 40 TABLET, DELAYED RELEASE ORAL at 08:46

## 2018-01-15 RX ADMIN — ACETYLCYSTEINE SCH ML: 200 SOLUTION ORAL; RESPIRATORY (INHALATION) at 20:00

## 2018-01-15 RX ADMIN — LISINOPRIL SCH MG: 10 TABLET ORAL at 20:55

## 2018-01-15 RX ADMIN — LEVALBUTEROL HYDROCHLORIDE SCH MG: 1.25 SOLUTION RESPIRATORY (INHALATION) at 07:00

## 2018-01-15 RX ADMIN — MIRTAZAPINE SCH MG: 15 TABLET, FILM COATED ORAL at 21:19

## 2018-01-15 RX ADMIN — LEVALBUTEROL HYDROCHLORIDE SCH MG: 1.25 SOLUTION RESPIRATORY (INHALATION) at 02:31

## 2018-01-15 RX ADMIN — MESALAMINE SCH MG: 250 CAPSULE ORAL at 08:47

## 2018-01-15 RX ADMIN — FUROSEMIDE SCH MG: 20 TABLET ORAL at 08:47

## 2018-01-15 RX ADMIN — MESALAMINE SCH MG: 250 CAPSULE ORAL at 20:55

## 2018-01-15 RX ADMIN — STANDARDIZED SENNA CONCENTRATE AND DOCUSATE SODIUM SCH TAB: 8.6; 5 TABLET ORAL at 08:46

## 2018-01-15 RX ADMIN — INSULIN ASPART SCH UNITS: 100 INJECTION, SOLUTION INTRAVENOUS; SUBCUTANEOUS at 12:24

## 2018-01-15 RX ADMIN — LISINOPRIL SCH MG: 10 TABLET ORAL at 08:46

## 2018-01-15 RX ADMIN — MORPHINE SULFATE PRN MG: 2 INJECTION, SOLUTION INTRAMUSCULAR; INTRAVENOUS at 13:18

## 2018-01-15 RX ADMIN — LEVALBUTEROL HYDROCHLORIDE SCH MG: 1.25 SOLUTION RESPIRATORY (INHALATION) at 21:00

## 2018-01-15 RX ADMIN — CITALOPRAM HYDROBROMIDE SCH MG: 40 TABLET ORAL at 08:46

## 2018-01-15 RX ADMIN — MORPHINE SULFATE PRN MG: 2 INJECTION, SOLUTION INTRAMUSCULAR; INTRAVENOUS at 08:41

## 2018-01-15 RX ADMIN — LEVALBUTEROL HYDROCHLORIDE SCH MG: 1.25 SOLUTION RESPIRATORY (INHALATION) at 14:29

## 2018-01-15 RX ADMIN — TRAMADOL HYDROCHLORIDE PRN MG: 50 TABLET, COATED ORAL at 08:44

## 2018-01-15 RX ADMIN — LATANOPROST SCH DROPS: 50 SOLUTION/ DROPS OPHTHALMIC at 20:55

## 2018-01-15 RX ADMIN — INSULIN ASPART SCH UNITS: 100 INJECTION, SOLUTION INTRAVENOUS; SUBCUTANEOUS at 17:25

## 2018-01-15 RX ADMIN — TRAMADOL HYDROCHLORIDE PRN MG: 50 TABLET, COATED ORAL at 20:54

## 2018-01-15 RX ADMIN — SIMVASTATIN SCH MG: 20 TABLET, FILM COATED ORAL at 20:55

## 2018-01-15 RX ADMIN — WARFARIN SODIUM SCH MG: 5 TABLET ORAL at 14:42

## 2018-01-15 RX ADMIN — ACETYLCYSTEINE SCH ML: 200 SOLUTION ORAL; RESPIRATORY (INHALATION) at 07:00

## 2018-01-15 NOTE — PROGRESS NOTE
Internal Med Progress Note


Date of Service:


Reji 15, 2018.


Provider Documentation:





SUBJECTIVE:





The patient was seen and examined 


Clinically stable and denies any symptoms


Has had discussion with the Daughter,Pulmonary and palliative care 


Trying to decreased the flow of Oxygen before discharge


Clinically stable -trying to decrease the flow of oxygen to maintain saturation 

~85 and up


Wants to go home 








OBJECTIVE:





Vital Signs-as noted below





Exam:


General-Minimal distress at rest with SOB


Eyes-normal


ENT-normal


Neck-supple


Lungs-Decreased breath sound bilaterally 


Minimal wheezing bilaterally 


Heart-Regular,no murmur 


Abdomen-Benign,no masses,bowel sound present 


Extremities-Trace edema bilaterally ,left ankle is in brace


Neuro-AAOx3





Lab data as noted below.


ASSESSMENT & PLAN:








Acute hypercapnic respiratory failure 


Secondary to COPD exacerbation and complicated by Pneumonia


CXR showed dense bibasilar airspace consolidation, right greater than left with 

associated pleural effusions.


ABG on admission showed respiratory acidosis with comp


Has been on  Levaquin and Zosyn


Negative influenza test 


Continue Solumedrol


Continue BIPAP with intermittent  high flow supplement oxygen 


Blood cx no growth


CT with PE protocol showed no evidence for PE. Dense bilateral lower lobe 

atelectasis/consolidation. Patchy airspace


opacities are also present within the right upper lobe lingula and right middle 

lobe.


Advised to USE BIPAP as much as she can to improve CO2 level and improve 

drowsiness


Lasix 40mg BID ot help fluid overload 


IV Vancomycin added to broaden the spectrum -discontinued


ECHO


* The echocardiogram is extremely technically limited.


* Patient was sitting upright for the test.


* There is no significant pericardial effusion noted.


* The left ventricular systolic function is grossly normal on limited 

visualization.


* The right ventricular systolic function is grossly normal.


* The valves are poorly visualized.


* There is no significant valvular stenosis or regurgitation on technically 

limited Doppler evaluation.


Continue current antibiotics for 3 more days 


Appreciate Pulmonary evaluation 


After a long discussion with the Daughter,palliative care and pulmonary ,

nonsurgical approach is taken


Wean of the High Flow Oxygen and maintain saturation >85%


Preparing to send her home with face musk Oxygen delivery


Trying to reduce the flow rate Oxygen before discharge


Continue PT/OT -waiting to be reasonably mobile to be discharged 


Remains stable 








Paroxysmal atrial flutter with rapid ventricular response


Due to acute hypercapnic respiratory failure


Received Cardizem and digoxin


Continue on heparin drip till therapeutic with Coumadin 


Continue Cardizem 240mg


Rate is controlled and denies any symptoms No acute issue 





Electrolytes abnormalities


Possible related to hypovolemia  


Na on admission 128


Na today 134


Supplement and recheck 











Left Ankle fracture s/p fall 


Left ankle xray showed distal tibial and fibular fractures as above with mild 

lateral subluxation of the talus.


Ortho on board


Continue required high flow oxygen and BIPAP for acute hypercapnic respiratory 

failure 


Continue to postpone surgery until respiratory status improves


Likely to have Conservative management of the Ankle


Surgery will carry a higher risk 


The issue was discussed in detailed with the Daughter and the patient


WILL NEED SURGERY FOR THE ANKLE FRACTURE AS PER ORTHO 


No surgery =will get appropriate brace before discharge


Ankle brace provided  and will ask for Cast Boot for the Left foot


Toe Touch allowed 


Avoid full weight bearing for 6 weeks in total 





Right pelvic ring fracture, 


Pelvis xray showed an age indeterminant right pubic ring fracture, possibly 

chronic.


Stable








Hypertension


BP elevated 


Continue lisinopril.  


will add amlodipine


PRN clonidine


Continue monitor BP








Ulcerative colitis


On Pentasa


Stable-no acute issue





Depression


On Celexa


Stable





DVT px 


On heparin drip





CODE STATUS DNR 





DISPOSITION


Continue monitor in tele


Consultants:


Pulmonary


Discussed with the Daughter -Shanta Nguyễn and explained about Hospice care 

and discharge home


The patient needs to be reasonably mobile -recommendation from the PT/OT before 

discharge








Vital Signs:











  Date Time  Temp Pulse Resp B/P (MAP) Pulse Ox O2 Delivery O2 Flow Rate FiO2


 


1/15/18 12:00      Oxymask 10.0 


 


1/15/18 08:09 36.8 84 28 170/66 (100) 91 Diffusion Mask 10.0 





  84      


 


1/15/18 08:00      Oxymask 10.0 


 


1/15/18 07:04  78 20  91 Mask 10.0 


 


1/15/18 04:22 37.0 77 22 165/91 (115) 93 Oxymask 10.0 





  82      


 


1/15/18 04:00      Oxymask 10.0 


 


1/15/18 00:00      Oxymask 10.0 


 


1/14/18 23:25  83   93   85


 


1/14/18 23:22 36.9 85 23 175/56 (95) 94 Oxymask 10.0 


 


1/14/18 20:00      Oxymask 10.0 


 


1/14/18 19:10  87 20  93 Mask 10.0 


 


1/14/18 19:04 36.7 76 27 172/73 (106) 90 Oxymask 10.0 


 


1/14/18 16:00      Oxymask 10.0 


 


1/14/18 15:22 36.7 68 21 132/42 (72) 88 High Flow Oxygen  


 


1/14/18 14:19  74 20  89 Mask 10.0 








Lab Results:





Results Past 24 Hours








Test


  1/14/18


16:22 1/14/18


20:03 1/14/18


22:58 1/15/18


05:32 Range/Units


 


 


Bedside Glucose 287 299 193  70-90  mg/dl


 


White Blood Count    15.94 4.8-10.8  K/uL


 


Red Blood Count    3.90 4.2-5.4  M/uL


 


Hemoglobin    11.2 12.0-16.0  g/dL


 


Hematocrit    35.3 37-47  %


 


Mean Corpuscular Volume    90.5   fL


 


Mean Corpuscular Hemoglobin    28.7 25-34  pg


 


Mean Corpuscular Hemoglobin


Concent 


  


  


  31.7


  32-36  g/dl


 


 


RDW Standard Deviation    47.2 36.4-46.3  fL


 


RDW Coefficient of Variation    14.5 11.5-14.5  %


 


Platelet Count    252 130-400  K/uL


 


Mean Platelet Volume    9.8 7.4-10.4  fL


 


Prothrombin Time


  


  


  


  43.2


  9.0-12.0


SECONDS


 


Prothromb Time International


Ratio 


  


  


  4.2


  0.9-1.1  


 


 


Sodium Level    138 136-145  mmol/L


 


Potassium Level    3.4 3.5-5.1  mmol/L


 


Chloride Level    92   mmol/L


 


Carbon Dioxide Level    43 21-32  mmol/L


 


Anion Gap    3.0 3-11  mmol/L


 


Blood Urea Nitrogen    21 7-18  mg/dl


 


Creatinine


  


  


  


  0.51


  0.60-1.20


mg/dl


 


Est Creatinine Clear Calc


Drug Dose 


  


  


  104.1


   ml/min


 


 


Estimated GFR (


American) 


  


  


  103.8


   


 


 


Estimated GFR (Non-


American 


  


  


  89.5


   


 


 


BUN/Creatinine Ratio    42.2 10-20  


 


Random Glucose    103 70-99  mg/dl


 


Calcium Level    8.1 8.5-10.1  mg/dl


 


Phosphorus Level    1.9 2.5-4.9  mg/dl


 


Magnesium Level    2.2 1.8-2.4  mg/dl


 


Test


  1/15/18


11:08 


  


  


  Range/Units


 


 


Bedside Glucose 245    70-90  mg/dl

## 2018-01-16 VITALS — HEART RATE: 78 BPM | OXYGEN SATURATION: 94 %

## 2018-01-16 VITALS — HEART RATE: 74 BPM | OXYGEN SATURATION: 92 %

## 2018-01-16 VITALS — HEART RATE: 74 BPM | OXYGEN SATURATION: 94 %

## 2018-01-16 VITALS
HEART RATE: 60 BPM | TEMPERATURE: 97.34 F | SYSTOLIC BLOOD PRESSURE: 106 MMHG | OXYGEN SATURATION: 96 % | DIASTOLIC BLOOD PRESSURE: 69 MMHG

## 2018-01-16 VITALS — HEART RATE: 62 BPM | DIASTOLIC BLOOD PRESSURE: 75 MMHG | SYSTOLIC BLOOD PRESSURE: 151 MMHG

## 2018-01-16 VITALS
DIASTOLIC BLOOD PRESSURE: 74 MMHG | OXYGEN SATURATION: 92 % | TEMPERATURE: 98.24 F | SYSTOLIC BLOOD PRESSURE: 167 MMHG | HEART RATE: 84 BPM

## 2018-01-16 VITALS — OXYGEN SATURATION: 95 %

## 2018-01-16 VITALS — HEART RATE: 84 BPM | OXYGEN SATURATION: 95 %

## 2018-01-16 VITALS — HEART RATE: 76 BPM | OXYGEN SATURATION: 91 %

## 2018-01-16 VITALS — OXYGEN SATURATION: 91 %

## 2018-01-16 VITALS — OXYGEN SATURATION: 90 %

## 2018-01-16 LAB — INR PPP: 3.2 (ref 0.9–1.1)

## 2018-01-16 RX ADMIN — TRAMADOL HYDROCHLORIDE PRN MG: 50 TABLET, COATED ORAL at 06:55

## 2018-01-16 RX ADMIN — ACETYLCYSTEINE SCH ML: 200 SOLUTION ORAL; RESPIRATORY (INHALATION) at 07:24

## 2018-01-16 RX ADMIN — TRAMADOL HYDROCHLORIDE PRN MG: 50 TABLET, COATED ORAL at 15:47

## 2018-01-16 RX ADMIN — CITALOPRAM HYDROBROMIDE SCH MG: 40 TABLET ORAL at 08:30

## 2018-01-16 RX ADMIN — LISINOPRIL SCH MG: 10 TABLET ORAL at 08:33

## 2018-01-16 RX ADMIN — LEVALBUTEROL HYDROCHLORIDE SCH MG: 1.25 SOLUTION RESPIRATORY (INHALATION) at 07:24

## 2018-01-16 RX ADMIN — FUROSEMIDE SCH MG: 20 TABLET ORAL at 08:32

## 2018-01-16 RX ADMIN — LEVALBUTEROL HYDROCHLORIDE SCH MG: 1.25 SOLUTION RESPIRATORY (INHALATION) at 02:07

## 2018-01-16 RX ADMIN — PANTOPRAZOLE SCH MG: 40 TABLET, DELAYED RELEASE ORAL at 08:31

## 2018-01-16 RX ADMIN — LEVALBUTEROL HYDROCHLORIDE SCH MG: 1.25 SOLUTION RESPIRATORY (INHALATION) at 14:46

## 2018-01-16 RX ADMIN — MIRTAZAPINE SCH MG: 15 TABLET, FILM COATED ORAL at 22:08

## 2018-01-16 RX ADMIN — SIMVASTATIN SCH MG: 20 TABLET, FILM COATED ORAL at 22:08

## 2018-01-16 RX ADMIN — INSULIN ASPART SCH UNITS: 100 INJECTION, SOLUTION INTRAVENOUS; SUBCUTANEOUS at 09:27

## 2018-01-16 RX ADMIN — STANDARDIZED SENNA CONCENTRATE AND DOCUSATE SODIUM SCH TAB: 8.6; 5 TABLET ORAL at 08:33

## 2018-01-16 RX ADMIN — WARFARIN SODIUM SCH MG: 5 TABLET ORAL at 15:51

## 2018-01-16 RX ADMIN — LATANOPROST SCH DROPS: 50 SOLUTION/ DROPS OPHTHALMIC at 21:56

## 2018-01-16 RX ADMIN — ACETYLCYSTEINE SCH ML: 200 SOLUTION ORAL; RESPIRATORY (INHALATION) at 20:18

## 2018-01-16 RX ADMIN — MESALAMINE SCH MG: 250 CAPSULE ORAL at 08:29

## 2018-01-16 RX ADMIN — DILTIAZEM HYDROCHLORIDE SCH MG: 240 CAPSULE, EXTENDED RELEASE ORAL at 08:33

## 2018-01-16 RX ADMIN — MICONAZOLE NITRATE PRN APPLN: 20 POWDER TOPICAL at 21:55

## 2018-01-16 RX ADMIN — LISINOPRIL SCH MG: 10 TABLET ORAL at 22:09

## 2018-01-16 RX ADMIN — INSULIN ASPART SCH UNITS: 100 INJECTION, SOLUTION INTRAVENOUS; SUBCUTANEOUS at 18:39

## 2018-01-16 RX ADMIN — INSULIN ASPART SCH UNITS: 100 INJECTION, SOLUTION INTRAVENOUS; SUBCUTANEOUS at 13:49

## 2018-01-16 RX ADMIN — INSULIN ASPART SCH UNITS: 100 INJECTION, SOLUTION INTRAVENOUS; SUBCUTANEOUS at 22:14

## 2018-01-16 RX ADMIN — LEVALBUTEROL HYDROCHLORIDE SCH MG: 1.25 SOLUTION RESPIRATORY (INHALATION) at 20:19

## 2018-01-16 RX ADMIN — MESALAMINE SCH MG: 250 CAPSULE ORAL at 22:09

## 2018-01-16 NOTE — PROGRESS NOTE
Internal Med Progress Note


Date of Service:


Jan 16, 2018.


Provider Documentation:





SUBJECTIVE:





Seen and examined at bedside


Continues to require 8 L of oxygen to maintain Sats


Reports chronic cough


Denies SOB, chest pain


Family at bedside


Prefers to go to [facility] and not to [facility]  


INR: in therapeutic range


 








OBJECTIVE:





Vital Signs-as noted below


General Appearance:Moderately built and nourished, no apparent distress


Head:  normocephalic, Atraumatic 


Eyes:  normal inspection, EOMI, PERRL


Neck:  supple, Trachea midline


Respiratory/Chest: Decreased breath sounds, CTA


Cardiovascular: S1, S2, No murmur


Abdomen/GI:Soft, Non tender, Bowel sounds present


Extremities/Musculoskelatal:normal inspection, Left LE in cast  


Neurologic/Psych:grossly no focal neurological deficits 


Skin:  normal color, warm





Lab data as noted below.


ASSESSMENT & PLAN:





Acute hypercapnic respiratory failure 


Chronic Oxygen Dependency: 2-4 L at baseline 


Secondary to COPD exacerbation and complicated by Pneumonia


CXR showed dense bibasilar airspace consolidation, right greater than left with 

associated pleural effusions.


CTA: showed no evidence for PE. Dense bilateral lower lobe atelectasis/

consolidation. Patchy airspace


opacities are also present within the right upper lobe lingula and right middle 

lobe.


Completed Levaquin and Zosyn course


Negative influenza test 


IV Solumedrol>>> transitioned to PO prednisone


Continue BIPAP QHS


Continue oxygen support


Blood Cultures: no growth


Appreciate Pulmonology Input


Lasix 20mg daily to help fluid overload 


ECHO


* The echocardiogram is extremely technically limited.


* Patient was sitting upright for the test.


* There is no significant pericardial effusion noted.


* The left ventricular systolic function is grossly normal on limited 

visualization.


* The right ventricular systolic function is grossly normal.


* The valves are poorly visualized.


* There is no significant valvular stenosis or regurgitation on technically 

limited Doppler evaluation.


Wean High Flow Oxygen as able and maintain saturation >88%


Patient refuses [facility] placement and prefers to go to [facility]  


Family/Patient initially preferred to take patient home with hospice in place 

but currently prefers [facility] 








P. atrial flutter with RVR


Due to acute hypercapnic respiratory failure


Continue Cardizem 


On Coumadin


Monitor INR:3.2 today








Hypokalemia:


Hyponatremia: 


Hyponatremia resolved


Replace potassium as needed











Left Ankle fracture s/p fall 


Left ankle xray showed distal tibial and fibular fractures as above with mild 

lateral subluxation of the talus.


Appreciate Orthopedics Input 


Conservative management of the Ankle as high risk for surgery


Ankle brace provided  and will ask for Cast Boot for the Left foot


Toe Touch allowed 


Avoid full weight bearing for 6 weeks in total 








Right pelvic ring fracture: 


Pelvis xray showed an age indeterminant right pubic ring fracture, possibly 

chronic.


Stable








Hypertension


Continue lisinopril


monitor 








Ulcerative colitis


On Pentasa


Stable


no acute issue





Depression


On Celexa


Stable





DVT px 


On Coumadin 





CODE STATUS


DNR 








DISPOSITION


Needs SNF placement Vs Home with Hospice


 consulted


Vital Signs:











  Date Time  Temp Pulse Resp B/P (MAP) Pulse Ox O2 Delivery O2 Flow Rate FiO2


 


1/16/18 14:46  74 20  92 Mask 8.0 


 


1/16/18 14:10     90 Oxymask 8.0 


 


1/16/18 07:50     95 Oxymask 8.0 


 


1/16/18 07:27  76 18  91 Mask 8.0 85


 


1/16/18 07:03   20     


 


1/16/18 06:59 36.3 60  106/69 (81) 96 Mask 7.0 


 


1/16/18 06:16     91 Oxymask 8.0 


 


1/16/18 05:27  62  151/75 (100)    


 


1/16/18 02:08  74 20  94 BiPAP/CPAP  85


 


1/15/18 23:22 36.8 81 18 174/69 (104) 92 Room Air  


 


1/15/18 23:20      BiPAP  


 


1/15/18 21:57  86   94   85


 


1/15/18 20:55  82 20  94 Mask 8.0 


 


1/15/18 20:55  82  172/68 (102)    


 


1/15/18 18:30      Oxymask 8.0 


 


1/15/18 18:15 36.7 87 16 179/78 (111) 91 Oxymask 8.0 


 


1/15/18 16:00      Oxymask 10.0 


 


1/15/18 15:20 37.0 84 23 155/88 (110) 87 Oxymask 7.0 








Lab Results:





Results Past 24 Hours








Test


  1/15/18


16:07 1/15/18


21:29 1/16/18


05:37 1/16/18


08:01 Range/Units


 


 


Bedside Glucose 162 176  111 70-90  mg/dl


 


Prothrombin Time


  


  


  32.4


  


  9.0-12.0


SECONDS


 


Prothromb Time International


Ratio 


  


  3.2


  


  0.9-1.1  


 


 


Test


  1/16/18


11:45 


  


  


  Range/Units


 


 


Bedside Glucose 160    70-90  mg/dl

## 2018-01-17 VITALS — OXYGEN SATURATION: 90 %

## 2018-01-17 VITALS
HEART RATE: 80 BPM | TEMPERATURE: 98.78 F | DIASTOLIC BLOOD PRESSURE: 67 MMHG | SYSTOLIC BLOOD PRESSURE: 181 MMHG | OXYGEN SATURATION: 90 %

## 2018-01-17 VITALS — OXYGEN SATURATION: 93 % | HEART RATE: 71 BPM

## 2018-01-17 VITALS — HEART RATE: 78 BPM | OXYGEN SATURATION: 93 %

## 2018-01-17 VITALS — OXYGEN SATURATION: 96 % | HEART RATE: 78 BPM

## 2018-01-17 VITALS
HEART RATE: 86 BPM | TEMPERATURE: 98.06 F | SYSTOLIC BLOOD PRESSURE: 168 MMHG | OXYGEN SATURATION: 91 % | DIASTOLIC BLOOD PRESSURE: 68 MMHG

## 2018-01-17 VITALS
TEMPERATURE: 97.34 F | SYSTOLIC BLOOD PRESSURE: 190 MMHG | DIASTOLIC BLOOD PRESSURE: 79 MMHG | OXYGEN SATURATION: 90 % | HEART RATE: 85 BPM

## 2018-01-17 VITALS — HEART RATE: 79 BPM | OXYGEN SATURATION: 93 %

## 2018-01-17 VITALS
DIASTOLIC BLOOD PRESSURE: 69 MMHG | HEART RATE: 82 BPM | TEMPERATURE: 97.88 F | SYSTOLIC BLOOD PRESSURE: 104 MMHG | OXYGEN SATURATION: 96 %

## 2018-01-17 VITALS
TEMPERATURE: 97.88 F | HEART RATE: 72 BPM | SYSTOLIC BLOOD PRESSURE: 152 MMHG | DIASTOLIC BLOOD PRESSURE: 72 MMHG | OXYGEN SATURATION: 90 %

## 2018-01-17 VITALS — DIASTOLIC BLOOD PRESSURE: 72 MMHG | SYSTOLIC BLOOD PRESSURE: 159 MMHG

## 2018-01-17 VITALS — OXYGEN SATURATION: 88 %

## 2018-01-17 LAB
BUN SERPL-MCNC: 20 MG/DL (ref 7–18)
CALCIUM SERPL-MCNC: 7.8 MG/DL (ref 8.5–10.1)
CO2 SERPL-SCNC: 39 MMOL/L (ref 21–32)
CREAT SERPL-MCNC: 0.42 MG/DL (ref 0.6–1.2)
EOSINOPHIL NFR BLD AUTO: 232 K/UL (ref 130–400)
GLUCOSE SERPL-MCNC: 97 MG/DL (ref 70–99)
HCT VFR BLD CALC: 31.8 % (ref 37–47)
HGB BLD-MCNC: 10.2 G/DL (ref 12–16)
INR PPP: 3.3 (ref 0.9–1.1)
MCH RBC QN AUTO: 29.7 PG (ref 25–34)
MCHC RBC AUTO-ENTMCNC: 32.1 G/DL (ref 32–36)
MCV RBC AUTO: 92.7 FL (ref 80–100)
PMV BLD AUTO: 9.9 FL (ref 7.4–10.4)
POTASSIUM SERPL-SCNC: 3.7 MMOL/L (ref 3.5–5.1)
RED CELL DISTRIBUTION WIDTH CV: 14.9 % (ref 11.5–14.5)
RED CELL DISTRIBUTION WIDTH SD: 50.2 FL (ref 36.4–46.3)
SODIUM SERPL-SCNC: 135 MMOL/L (ref 136–145)
WBC # BLD AUTO: 13.35 K/UL (ref 4.8–10.8)

## 2018-01-17 RX ADMIN — STANDARDIZED SENNA CONCENTRATE AND DOCUSATE SODIUM SCH TAB: 8.6; 5 TABLET ORAL at 09:13

## 2018-01-17 RX ADMIN — WARFARIN SODIUM SCH MG: 5 TABLET ORAL at 16:39

## 2018-01-17 RX ADMIN — INSULIN ASPART SCH UNITS: 100 INJECTION, SOLUTION INTRAVENOUS; SUBCUTANEOUS at 13:43

## 2018-01-17 RX ADMIN — INSULIN ASPART SCH UNITS: 100 INJECTION, SOLUTION INTRAVENOUS; SUBCUTANEOUS at 18:32

## 2018-01-17 RX ADMIN — ACETYLCYSTEINE SCH ML: 200 SOLUTION ORAL; RESPIRATORY (INHALATION) at 20:18

## 2018-01-17 RX ADMIN — FUROSEMIDE SCH MG: 20 TABLET ORAL at 09:14

## 2018-01-17 RX ADMIN — SIMVASTATIN SCH MG: 20 TABLET, FILM COATED ORAL at 21:10

## 2018-01-17 RX ADMIN — MESALAMINE SCH MG: 250 CAPSULE ORAL at 09:15

## 2018-01-17 RX ADMIN — INSULIN ASPART SCH UNITS: 100 INJECTION, SOLUTION INTRAVENOUS; SUBCUTANEOUS at 21:13

## 2018-01-17 RX ADMIN — LEVALBUTEROL HYDROCHLORIDE SCH MG: 1.25 SOLUTION RESPIRATORY (INHALATION) at 07:38

## 2018-01-17 RX ADMIN — MESALAMINE SCH MG: 250 CAPSULE ORAL at 21:10

## 2018-01-17 RX ADMIN — LISINOPRIL SCH MG: 10 TABLET ORAL at 09:14

## 2018-01-17 RX ADMIN — CITALOPRAM HYDROBROMIDE SCH MG: 40 TABLET ORAL at 09:13

## 2018-01-17 RX ADMIN — MIRTAZAPINE SCH MG: 15 TABLET, FILM COATED ORAL at 21:10

## 2018-01-17 RX ADMIN — INSULIN ASPART SCH UNITS: 100 INJECTION, SOLUTION INTRAVENOUS; SUBCUTANEOUS at 09:12

## 2018-01-17 RX ADMIN — MICONAZOLE NITRATE PRN APPLN: 20 POWDER TOPICAL at 21:14

## 2018-01-17 RX ADMIN — LATANOPROST SCH DROPS: 50 SOLUTION/ DROPS OPHTHALMIC at 21:09

## 2018-01-17 RX ADMIN — LEVALBUTEROL HYDROCHLORIDE SCH MG: 1.25 SOLUTION RESPIRATORY (INHALATION) at 14:34

## 2018-01-17 RX ADMIN — PANTOPRAZOLE SCH MG: 40 TABLET, DELAYED RELEASE ORAL at 09:14

## 2018-01-17 RX ADMIN — DILTIAZEM HYDROCHLORIDE SCH MG: 240 CAPSULE, EXTENDED RELEASE ORAL at 09:13

## 2018-01-17 RX ADMIN — LEVALBUTEROL HYDROCHLORIDE SCH MG: 1.25 SOLUTION RESPIRATORY (INHALATION) at 02:01

## 2018-01-17 RX ADMIN — LISINOPRIL SCH MG: 10 TABLET ORAL at 21:10

## 2018-01-17 RX ADMIN — ACETYLCYSTEINE SCH ML: 200 SOLUTION ORAL; RESPIRATORY (INHALATION) at 07:38

## 2018-01-17 RX ADMIN — LEVALBUTEROL HYDROCHLORIDE SCH MG: 1.25 SOLUTION RESPIRATORY (INHALATION) at 20:18

## 2018-01-17 NOTE — PROGRESS NOTE
Internal Med Progress Note


Date of Service:


Jan 17, 2018.


Provider Documentation:





SUBJECTIVE:





Seen and examined at bedside


Feels anxious today due to stress 


Continues to require 8 L of oxygen to maintain Sats


Reports chronic cough


Denies SOB, chest pain


INR: in therapeutic range


Clinically no significant change  








OBJECTIVE:





Vital Signs-as noted below


General Appearance:Moderately built and nourished, no apparent distress


Head:  normocephalic, Atraumatic 


Eyes:  normal inspection, EOMI, PERRL


Neck:  supple, Trachea midline


Respiratory/Chest: Decreased breath sounds, Scattered wheezes


Cardiovascular: S1, S2, No murmur


Abdomen/GI:Soft, Non tender, Bowel sounds present


Extremities/Musculoskelatal:normal inspection, Left LE in cast  


Neurologic/Psych:grossly no focal neurological deficits 


Skin:  normal color, warm





Lab data as noted below.


ASSESSMENT & PLAN:





Acute hypercapnic respiratory failure 


Chronic Oxygen Dependency: 2-4 L at baseline 


Secondary to COPD exacerbation and complicated by Pneumonia


CXR showed dense bibasilar airspace consolidation, right greater than left with 

associated pleural effusions.


CTA: showed no evidence for PE. Dense bilateral lower lobe atelectasis/

consolidation. Patchy airspace


opacities are also present within the right upper lobe lingula and right middle 

lobe.


Completed Levaquin and Zosyn course


Negative influenza test 


IV Solumedrol>>> transitioned to PO prednisone


Continue Prednisone 40mg for now


Continue BIPAP QHS


Continue oxygen support


Blood Cultures: no growth


Appreciate Pulmonology Input


Lasix 20mg daily to help fluid overload 


ECHO


* The echocardiogram is extremely technically limited.


* Patient was sitting upright for the test.


* There is no significant pericardial effusion noted.


* The left ventricular systolic function is grossly normal on limited 

visualization.


* The right ventricular systolic function is grossly normal.


* The valves are poorly visualized.


* There is no significant valvular stenosis or regurgitation on technically 

limited Doppler evaluation.


Wean High Flow Oxygen as able and maintain saturation >88%


Patient refuses [facility] placement and prefers to go to [facility] 

instead 


Family/Patient initially preferred to take patient home with hospice in place 

but currently prefers SNF 


continue current management








P. atrial flutter with RVR


Due to acute hypercapnic respiratory failure


Continue Cardizem for rate control


Continue Coumadin


Monitor INR:3.3 today








Hypokalemia/Hyponatremia: 


Resolved


monitor








Left Ankle fracture s/p fall 


Left ankle xray showed distal tibial and fibular fractures as above with mild 

lateral subluxation of the talus.


Appreciate Orthopedics Input 


Conservative management of the Ankle as high risk for surgery


Ankle brace provided  and will ask for Cast Boot for the Left foot


Toe Touch allowed 


Avoid full weight bearing for 6 weeks in total 








Right pelvic ring fracture: 


Pelvis xray showed an age indeterminant right pubic ring fracture, possibly 

chronic.


Stable








Hypertension:


labile likely related to anxiety


Continue lisinopril


monitor 








Ulcerative colitis


On Pentasa


Stable


no acute issue





Depression


On Celexa


Stable





DVT px 


On Coumadin 





CODE STATUS


DNR 








DISPOSITION


Needs SNF placement Vs Home with Hospice


 consulted


Vital Signs:











  Date Time  Temp Pulse Resp B/P (MAP) Pulse Ox O2 Delivery O2 Flow Rate FiO2


 


1/17/18 15:15 36.7 86 24 168/68 (101) 91 Oxymask 8.0 


 


1/17/18 14:34  78 20  93 Mask 7.0 


 


1/17/18 14:20     88 Oxymask 8.0 


 


1/17/18 12:31    159/72 (101)    


 


1/17/18 11:45 36.3 85 20 190/79 (116) 90 Room Air  


 


1/17/18 08:29     90 Oxymask 8.0 


 


1/17/18 08:19 36.6 72 12 152/72 (98) 90 Oxymask 8.0 


 


1/17/18 07:45     90 Oxymask 8.0 


 


1/17/18 07:38  71 20  93 Mask 8.0 


 


1/17/18 02:01  78 20  96 BiPAP/CPAP  85


 


1/17/18 00:10      BiPAP  


 


1/17/18 00:00 36.6 82 16 104/69 (81) 96 BiPAP  


 


1/16/18 23:00  84   95   85


 


1/16/18 20:19  78 20  94 Mask 8.0 


 


1/16/18 17:30      Oxymask 8.0 








Lab Results:





Results Past 24 Hours








Test


  1/16/18


17:00 1/16/18


21:01 1/17/18


05:54 1/17/18


08:05 Range/Units


 


 


Bedside Glucose 197 261  106 70-90  mg/dl


 


White Blood Count   13.35  4.8-10.8  K/uL


 


Red Blood Count   3.43  4.2-5.4  M/uL


 


Hemoglobin   10.2  12.0-16.0  g/dL


 


Hematocrit   31.8  37-47  %


 


Mean Corpuscular Volume   92.7    fL


 


Mean Corpuscular Hemoglobin   29.7  25-34  pg


 


Mean Corpuscular Hemoglobin


Concent 


  


  32.1


  


  32-36  g/dl


 


 


RDW Standard Deviation   50.2  36.4-46.3  fL


 


RDW Coefficient of Variation   14.9  11.5-14.5  %


 


Platelet Count   232  130-400  K/uL


 


Mean Platelet Volume   9.9  7.4-10.4  fL


 


Prothrombin Time


  


  


  34.1


  


  9.0-12.0


SECONDS


 


Prothromb Time International


Ratio 


  


  3.3


  


  0.9-1.1  


 


 


Sodium Level   135  136-145  mmol/L


 


Potassium Level   3.7  3.5-5.1  mmol/L


 


Chloride Level   94    mmol/L


 


Carbon Dioxide Level   39  21-32  mmol/L


 


Anion Gap   2.0  3-11  mmol/L


 


Blood Urea Nitrogen   20  7-18  mg/dl


 


Creatinine


  


  


  0.42


  


  0.60-1.20


mg/dl


 


Est Creatinine Clear Calc


Drug Dose 


  


  126.5


  


   ml/min


 


 


Estimated GFR (


American) 


  


  110.6


  


   


 


 


Estimated GFR (Non-


American 


  


  95.5


  


   


 


 


BUN/Creatinine Ratio   48.1  10-20  


 


Random Glucose   97  70-99  mg/dl


 


Calcium Level   7.8  8.5-10.1  mg/dl


 


Magnesium Level   2.3  1.8-2.4  mg/dl


 


Test


  1/17/18


12:03 


  


  


  Range/Units


 


 


Bedside Glucose 156    70-90  mg/dl

## 2018-01-18 VITALS — SYSTOLIC BLOOD PRESSURE: 187 MMHG | DIASTOLIC BLOOD PRESSURE: 82 MMHG | OXYGEN SATURATION: 95 % | HEART RATE: 84 BPM

## 2018-01-18 VITALS — OXYGEN SATURATION: 94 % | HEART RATE: 86 BPM

## 2018-01-18 VITALS
SYSTOLIC BLOOD PRESSURE: 145 MMHG | HEART RATE: 80 BPM | TEMPERATURE: 98.6 F | DIASTOLIC BLOOD PRESSURE: 74 MMHG | OXYGEN SATURATION: 93 %

## 2018-01-18 VITALS — SYSTOLIC BLOOD PRESSURE: 156 MMHG | DIASTOLIC BLOOD PRESSURE: 73 MMHG

## 2018-01-18 VITALS — HEART RATE: 71 BPM | OXYGEN SATURATION: 93 %

## 2018-01-18 VITALS
TEMPERATURE: 98.06 F | SYSTOLIC BLOOD PRESSURE: 128 MMHG | HEART RATE: 83 BPM | DIASTOLIC BLOOD PRESSURE: 68 MMHG | OXYGEN SATURATION: 91 %

## 2018-01-18 VITALS — OXYGEN SATURATION: 90 % | HEART RATE: 82 BPM

## 2018-01-18 VITALS — HEART RATE: 84 BPM | OXYGEN SATURATION: 94 %

## 2018-01-18 VITALS — OXYGEN SATURATION: 93 %

## 2018-01-18 VITALS — SYSTOLIC BLOOD PRESSURE: 172 MMHG | DIASTOLIC BLOOD PRESSURE: 74 MMHG

## 2018-01-18 VITALS — OXYGEN SATURATION: 90 %

## 2018-01-18 VITALS — SYSTOLIC BLOOD PRESSURE: 166 MMHG | HEART RATE: 82 BPM | OXYGEN SATURATION: 92 % | DIASTOLIC BLOOD PRESSURE: 63 MMHG

## 2018-01-18 VITALS
HEART RATE: 84 BPM | TEMPERATURE: 98.24 F | DIASTOLIC BLOOD PRESSURE: 64 MMHG | SYSTOLIC BLOOD PRESSURE: 191 MMHG | OXYGEN SATURATION: 90 %

## 2018-01-18 VITALS — HEART RATE: 78 BPM | OXYGEN SATURATION: 92 %

## 2018-01-18 LAB
BUN SERPL-MCNC: 19 MG/DL (ref 7–18)
CALCIUM SERPL-MCNC: 8 MG/DL (ref 8.5–10.1)
CO2 SERPL-SCNC: 39 MMOL/L (ref 21–32)
CREAT SERPL-MCNC: 0.39 MG/DL (ref 0.6–1.2)
GLUCOSE SERPL-MCNC: 110 MG/DL (ref 70–99)
INR PPP: 3.9 (ref 0.9–1.1)
POTASSIUM SERPL-SCNC: 3.2 MMOL/L (ref 3.5–5.1)
SODIUM SERPL-SCNC: 138 MMOL/L (ref 136–145)

## 2018-01-18 RX ADMIN — ACETYLCYSTEINE SCH ML: 200 SOLUTION ORAL; RESPIRATORY (INHALATION) at 20:27

## 2018-01-18 RX ADMIN — INSULIN ASPART SCH UNITS: 100 INJECTION, SOLUTION INTRAVENOUS; SUBCUTANEOUS at 13:24

## 2018-01-18 RX ADMIN — LISINOPRIL SCH MG: 10 TABLET ORAL at 09:06

## 2018-01-18 RX ADMIN — LISINOPRIL SCH MG: 10 TABLET ORAL at 21:17

## 2018-01-18 RX ADMIN — DILTIAZEM HYDROCHLORIDE SCH MG: 240 CAPSULE, EXTENDED RELEASE ORAL at 09:07

## 2018-01-18 RX ADMIN — LEVALBUTEROL HYDROCHLORIDE SCH MG: 1.25 SOLUTION RESPIRATORY (INHALATION) at 20:28

## 2018-01-18 RX ADMIN — PANTOPRAZOLE SCH MG: 40 TABLET, DELAYED RELEASE ORAL at 09:07

## 2018-01-18 RX ADMIN — CITALOPRAM HYDROBROMIDE SCH MG: 40 TABLET ORAL at 09:07

## 2018-01-18 RX ADMIN — INSULIN ASPART SCH UNITS: 100 INJECTION, SOLUTION INTRAVENOUS; SUBCUTANEOUS at 21:20

## 2018-01-18 RX ADMIN — MESALAMINE SCH MG: 250 CAPSULE ORAL at 21:16

## 2018-01-18 RX ADMIN — SIMVASTATIN SCH MG: 20 TABLET, FILM COATED ORAL at 21:17

## 2018-01-18 RX ADMIN — LEVALBUTEROL HYDROCHLORIDE SCH MG: 1.25 SOLUTION RESPIRATORY (INHALATION) at 14:22

## 2018-01-18 RX ADMIN — LEVALBUTEROL HYDROCHLORIDE SCH MG: 1.25 SOLUTION RESPIRATORY (INHALATION) at 07:04

## 2018-01-18 RX ADMIN — MESALAMINE SCH MG: 250 CAPSULE ORAL at 09:06

## 2018-01-18 RX ADMIN — MIRTAZAPINE SCH MG: 15 TABLET, FILM COATED ORAL at 21:16

## 2018-01-18 RX ADMIN — FUROSEMIDE SCH MG: 20 TABLET ORAL at 10:47

## 2018-01-18 RX ADMIN — NYSTATIN SCH ML: 100000 SUSPENSION ORAL at 16:48

## 2018-01-18 RX ADMIN — INSULIN ASPART SCH UNITS: 100 INJECTION, SOLUTION INTRAVENOUS; SUBCUTANEOUS at 18:56

## 2018-01-18 RX ADMIN — STANDARDIZED SENNA CONCENTRATE AND DOCUSATE SODIUM SCH TAB: 8.6; 5 TABLET ORAL at 09:07

## 2018-01-18 RX ADMIN — ACETYLCYSTEINE SCH ML: 200 SOLUTION ORAL; RESPIRATORY (INHALATION) at 07:04

## 2018-01-18 RX ADMIN — LEVALBUTEROL HYDROCHLORIDE SCH MG: 1.25 SOLUTION RESPIRATORY (INHALATION) at 01:55

## 2018-01-18 RX ADMIN — LATANOPROST SCH DROPS: 50 SOLUTION/ DROPS OPHTHALMIC at 21:14

## 2018-01-18 RX ADMIN — INSULIN ASPART SCH UNITS: 100 INJECTION, SOLUTION INTRAVENOUS; SUBCUTANEOUS at 09:04

## 2018-01-18 RX ADMIN — NYSTATIN SCH ML: 100000 SUSPENSION ORAL at 21:16

## 2018-01-18 NOTE — PALLIATIVE CARE PROGRESS NOTE
Palliative Care Progress Note


Date of Service


Jan 18, 2018.





Subjective


Pt evaluation today including:  conversation w/ patient, conversation w/ family 

(daughter, Kirti Miller), physical exam, chart review, conversation w/ consultant (

Dr. Zafar), review of inpatient medication list


Pain:  none at this time


PO Intake:  tolerating small amounts


Voiding:  chambers catheter in place


-Patient had foot recasted last week by Ortho.


-Oxygen was weaned down to 8L via Oxymask and patient seemed to be doing a 

little better.


-She still has not been able to tolerate any sort of activity or getting out of 

bed.


-I was contacted by hospitalist physician today who stated that patient seems 

to be decompensating yesterday and today. Oxygen requirements are going back up

, now on 10-11L via Oxymask. Patient is more tired and short of breath. I was 

asked to reengage and speak with patient and family. See plan below.





Review of Systems


Constitutional:  + weakness


ENT:  No trouble swallowing


Respiratory:  + shortness of breath, + dyspnea on exertion, + problem reported (

moist cough), No wheezing


Cardiac:  No chest pain, No edema


Abdomen:  No pain, No nausea, No vomiting


Female :  No problem reported


Psychiatric:  No depression symptoms, No anxiety





Objective


Vital Signs











  Date Time  Temp Pulse Resp B/P (MAP) Pulse Ox O2 Delivery O2 Flow Rate FiO2


 


1/18/18 15:08 36.7 83 22 128/68 (88) 91 Oxyhood 8.0 


 


1/18/18 14:22  71 20  93 Mask 9.0 


 


1/18/18 13:08  82 21 166/63 (97) 92   


 


1/18/18 12:06  84 20 187/82 (117) 95 Oxymask 11.0 


 


1/18/18 08:30     90 Mask 10.0 85


 


1/18/18 08:24    172/74 (106)    


 


1/18/18 07:07  82 18  90 Mask 10.0 


 


1/18/18 07:05 36.8 84 19 191/64 (106) 90 Mask 10.0 


 


1/18/18 02:49     93 Oxymask 11.0 


 


1/18/18 02:08  84 20  94 BiPAP/CPAP  85


 


1/18/18 01:55  86   94   85


 


1/18/18 00:54    156/73 (100)    


 


1/17/18 23:26 37.1 80 16 181/67 (105) 90 Oxymask 11.0 


 


1/17/18 20:30      Oxymask 8.0 


 


1/17/18 20:20  79 20  93 Mask 7.0 











Physical Exam


General Appearance:  no apparent distress


ENT:  hearing grossly normal


Neck:  supple, no JVD


Respiratory/Chest:  + decreased breath sounds, + accessory muscle use, + rhonchi

 (coarse throughout), + pertinent finding (rattling respirations)


Cardiovascular:  regular rate, rhythm, + normal peripheral pulses


Abdomen:  normal bowel sounds, non tender, soft


Neurologic/Psychiatric:  alert (but somewhat drowsy/lethargic), normal mood/

affect, oriented x 3


Skin:  normal color





Laboratory Results





Last 24 Hours








Test


  1/17/18


17:14 1/18/18


05:47 1/18/18


08:10 1/18/18


11:58


 


Bedside Glucose 179 mg/dl   147 mg/dl  180 mg/dl 


 


Prothrombin Time  39.8 SECONDS   


 


Prothromb Time International


Ratio 


  3.9 


  


  


 


 


Sodium Level  138 mmol/L   


 


Potassium Level  3.2 mmol/L   


 


Chloride Level  96 mmol/L   


 


Carbon Dioxide Level  39 mmol/L   


 


Anion Gap  3.0 mmol/L   


 


Blood Urea Nitrogen  19 mg/dl   


 


Creatinine  0.39 mg/dl   


 


Est Creatinine Clear Calc


Drug Dose 


  136.2 ml/min 


  


  


 


 


Estimated GFR (


American) 


  113.4 


  


  


 


 


Estimated GFR (Non-


American 


  97.8 


  


  


 


 


BUN/Creatinine Ratio  49.3   


 


Random Glucose  110 mg/dl   


 


Calcium Level  8.0 mg/dl   


 


Magnesium Level  2.3 mg/dl   











Assessment and Plan


Problem list:


Pain, left ankle and sacrum (from lying in bed)- none at this time


Weakness


SOB/BRANDT


COPD, advanced/end-stage


Acute on chronic respiratory failure


Left ankle fracture


Paroxysmal afib with RVR


Goals of care (Z51.5)





Palliative care recs: discussed with patient, patient's daughter Kirti Miller, and 

Dr. Zafar.


-I met with the patient in room 306. She is quite SOB with rattling 

respirations. She is tired but awake and able to talk with me. Patient agrees 

that she is not doing better. She wanted to go to [facility], but I conveyed 

my concern to her that she may not tolerate that level of rehab considering she 

hasn't been able to do any therapy here in the hospital and continues to 

decline. We talked about transitioning to comfort measures only and either 

going home or to a facility with hospice care. Patient said "that would be okay

" but wanted me to speak with her daughter Kirti Miller about the above. I spoke 

with Kirti Miller and she agrees that she does not think patient is going to get 

better. She asked if I would call patient's other daughter Shanta, which I did 

and left a message. Kirti Miller will speak to both her sisters tonight and they 

will let us know if they come to a decision tomorrow.


Palliative Performance Scale:  30 % (unable to get OOB at this time)

## 2018-01-18 NOTE — PROGRESS NOTE
Internal Med Progress Note


Date of Service:


Jan 18, 2018.


Provider Documentation:





SUBJECTIVE:





Seen and examined at bedside


States having soreness of tongue


Feels tired 


Continues to require 8 L- 10L of oxygen to maintain Sats


Reports chronic cough


Denies SOB, chest pain


INR supra therapeutic 


Clinically seems to be deteriorating  








OBJECTIVE:





Vital Signs-as noted below


General Appearance:Moderately built and nourished, no apparent distress


Head:  normocephalic, Atraumatic 


Eyes:  normal inspection, EOMI, PERRL


Neck:  supple, Trachea midline


Respiratory/Chest: coarse breath sounds, B/L rhonchi


Cardiovascular: S1, S2, No murmur


Abdomen/GI:Soft, Non tender, Bowel sounds present


Extremities/Musculoskelatal:normal inspection, Left LE in cast  


Neurologic/Psych:grossly no focal neurological deficits 


Skin:  normal color, warm





Lab data as noted below.


ASSESSMENT & PLAN:





Acute hypercapnic respiratory failure 


Chronic Oxygen Dependency: 2-4 L at baseline 


Secondary to COPD exacerbation and complicated by Pneumonia


CXR showed dense bibasilar airspace consolidation, right greater than left with 

associated pleural effusions.


CTA: showed no evidence for PE. Dense bilateral lower lobe atelectasis/

consolidation. Patchy airspace


opacities are also present within the right upper lobe lingula and right middle 

lobe.


Completed Levaquin and Zosyn course


Negative influenza test 


IV Solumedrol>>> transitioned to PO prednisone


Continue Prednisone 40mg for now


Continue BIPAP QHS


Continue oxygen support


Blood Cultures: no growth


Appreciate Pulmonology Input


Lasix 20mg daily to help fluid overload 


ECHO


* The echocardiogram is extremely technically limited.


* Patient was sitting upright for the test.


* There is no significant pericardial effusion noted.


* The left ventricular systolic function is grossly normal on limited 

visualization.


* The right ventricular systolic function is grossly normal.


* The valves are poorly visualized.


* There is no significant valvular stenosis or regurgitation on technically 

limited Doppler evaluation.


Wean High Flow Oxygen as able and maintain saturation >88%


Clinically seems to be deteriorating


Palliative care following 


Continues to be BiPAP and high flow oxygen dependent  


Requires 8-10L oxygen to maintain Sats











Oral Candidiasis:


Started on Nystatin Day #1











P. atrial flutter with RVR


Due to acute hypercapnic respiratory failure


Continue Cardizem for rate control


Monitor INR:3.9 today


Hold Coumadin for now








Hypokalemia/Hyponatremia: 


Hypokalemia likely secondary to poor oral intake and diuretics


Replace and monitor








Left Ankle fracture s/p fall 


Left ankle xray showed distal tibial and fibular fractures as above with mild 

lateral subluxation of the talus.


Appreciate Orthopedics Input 


Conservative management of the Ankle as high risk for surgery


Ankle brace provided  and will ask for Cast Boot for the Left foot


Toe Touch allowed 


Avoid full weight bearing for 6 weeks in total 








Right pelvic ring fracture: 


Pelvis xray showed an age indeterminant right pubic ring fracture, possibly 

chronic.


Stable








Hypertension:


labile likely related to anxiety


Continue lisinopril


monitor 








Ulcerative colitis


On Pentasa


Stable


no acute issue








Depression


On Celexa


Stable





DVT px 


INR supra therapeutic: Coumadin on hold


 





CODE STATUS


DNR 








DISPOSITION


Needs SNF placement Vs Home with Hospice


 consulted


Vital Signs:











  Date Time  Temp Pulse Resp B/P (MAP) Pulse Ox O2 Delivery O2 Flow Rate FiO2


 


1/18/18 13:08  82 21 166/63 (97) 92   


 


1/18/18 12:06  84 20 187/82 (117) 95 Oxymask 11.0 


 


1/18/18 08:30     90 Mask 10.0 85


 


1/18/18 08:24    172/74 (106)    


 


1/18/18 07:07  82 18  90 Mask 10.0 


 


1/18/18 07:05 36.8 84 19 191/64 (106) 90 Mask 10.0 


 


1/18/18 02:49     93 Oxymask 11.0 


 


1/18/18 02:08  84 20  94 BiPAP/CPAP  85


 


1/18/18 01:55  86   94   85


 


1/18/18 00:54    156/73 (100)    


 


1/17/18 23:26 37.1 80 16 181/67 (105) 90 Oxymask 11.0 


 


1/17/18 20:30      Oxymask 8.0 


 


1/17/18 20:20  79 20  93 Mask 7.0 


 


1/17/18 15:15 36.7 86 24 168/68 (101) 91 Oxymask 8.0 


 


1/17/18 14:34  78 20  93 Mask 7.0 


 


1/17/18 14:20     88 Oxymask 8.0 








Lab Results:





Results Past 24 Hours








Test


  1/17/18


17:14 1/18/18


05:47 1/18/18


08:10 1/18/18


11:58 Range/Units


 


 


Bedside Glucose 179  147 180 70-90  mg/dl


 


Prothrombin Time


  


  39.8


  


  


  9.0-12.0


SECONDS


 


Prothromb Time International


Ratio 


  3.9


  


  


  0.9-1.1  


 


 


Sodium Level  138   136-145  mmol/L


 


Potassium Level  3.2   3.5-5.1  mmol/L


 


Chloride Level  96     mmol/L


 


Carbon Dioxide Level  39   21-32  mmol/L


 


Anion Gap  3.0   3-11  mmol/L


 


Blood Urea Nitrogen  19   7-18  mg/dl


 


Creatinine


  


  0.39


  


  


  0.60-1.20


mg/dl


 


Est Creatinine Clear Calc


Drug Dose 


  136.2


  


  


   ml/min


 


 


Estimated GFR (


American) 


  113.4


  


  


   


 


 


Estimated GFR (Non-


American 


  97.8


  


  


   


 


 


BUN/Creatinine Ratio  49.3   10-20  


 


Random Glucose  110   70-99  mg/dl


 


Calcium Level  8.0   8.5-10.1  mg/dl


 


Magnesium Level  2.3   1.8-2.4  mg/dl

## 2018-01-19 VITALS
DIASTOLIC BLOOD PRESSURE: 63 MMHG | SYSTOLIC BLOOD PRESSURE: 136 MMHG | OXYGEN SATURATION: 95 % | HEART RATE: 71 BPM | TEMPERATURE: 98.06 F

## 2018-01-19 VITALS
TEMPERATURE: 97.7 F | HEART RATE: 73 BPM | DIASTOLIC BLOOD PRESSURE: 67 MMHG | SYSTOLIC BLOOD PRESSURE: 132 MMHG | OXYGEN SATURATION: 94 %

## 2018-01-19 VITALS
OXYGEN SATURATION: 93 % | SYSTOLIC BLOOD PRESSURE: 186 MMHG | TEMPERATURE: 98.6 F | HEART RATE: 82 BPM | DIASTOLIC BLOOD PRESSURE: 68 MMHG

## 2018-01-19 VITALS — HEART RATE: 82 BPM | OXYGEN SATURATION: 93 %

## 2018-01-19 VITALS — OXYGEN SATURATION: 93 % | HEART RATE: 77 BPM

## 2018-01-19 VITALS — HEART RATE: 80 BPM | OXYGEN SATURATION: 94 %

## 2018-01-19 VITALS — HEART RATE: 83 BPM | SYSTOLIC BLOOD PRESSURE: 192 MMHG | OXYGEN SATURATION: 93 % | DIASTOLIC BLOOD PRESSURE: 84 MMHG

## 2018-01-19 VITALS — OXYGEN SATURATION: 96 %

## 2018-01-19 VITALS — HEART RATE: 76 BPM | OXYGEN SATURATION: 95 %

## 2018-01-19 LAB
BUN SERPL-MCNC: 18 MG/DL (ref 7–18)
CALCIUM SERPL-MCNC: 7.9 MG/DL (ref 8.5–10.1)
CO2 SERPL-SCNC: 40 MMOL/L (ref 21–32)
CREAT SERPL-MCNC: 0.4 MG/DL (ref 0.6–1.2)
GLUCOSE SERPL-MCNC: 111 MG/DL (ref 70–99)
INR PPP: 4.5 (ref 0.9–1.1)
POTASSIUM SERPL-SCNC: 3.6 MMOL/L (ref 3.5–5.1)
SODIUM SERPL-SCNC: 139 MMOL/L (ref 136–145)

## 2018-01-19 RX ADMIN — INSULIN ASPART SCH UNITS: 100 INJECTION, SOLUTION INTRAVENOUS; SUBCUTANEOUS at 18:21

## 2018-01-19 RX ADMIN — ACETYLCYSTEINE SCH ML: 200 SOLUTION ORAL; RESPIRATORY (INHALATION) at 07:03

## 2018-01-19 RX ADMIN — MESALAMINE SCH MG: 250 CAPSULE ORAL at 20:55

## 2018-01-19 RX ADMIN — INSULIN ASPART SCH UNITS: 100 INJECTION, SOLUTION INTRAVENOUS; SUBCUTANEOUS at 13:20

## 2018-01-19 RX ADMIN — MESALAMINE SCH MG: 250 CAPSULE ORAL at 09:26

## 2018-01-19 RX ADMIN — NYSTATIN SCH ML: 100000 SUSPENSION ORAL at 20:53

## 2018-01-19 RX ADMIN — PANTOPRAZOLE SCH MG: 40 TABLET, DELAYED RELEASE ORAL at 09:27

## 2018-01-19 RX ADMIN — NYSTATIN SCH ML: 100000 SUSPENSION ORAL at 17:37

## 2018-01-19 RX ADMIN — LEVALBUTEROL HYDROCHLORIDE SCH MG: 1.25 SOLUTION RESPIRATORY (INHALATION) at 14:25

## 2018-01-19 RX ADMIN — INSULIN ASPART SCH UNITS: 100 INJECTION, SOLUTION INTRAVENOUS; SUBCUTANEOUS at 21:06

## 2018-01-19 RX ADMIN — FUROSEMIDE SCH MG: 20 TABLET ORAL at 09:26

## 2018-01-19 RX ADMIN — LEVALBUTEROL HYDROCHLORIDE SCH MG: 1.25 SOLUTION RESPIRATORY (INHALATION) at 02:04

## 2018-01-19 RX ADMIN — NYSTATIN SCH ML: 100000 SUSPENSION ORAL at 09:26

## 2018-01-19 RX ADMIN — LEVALBUTEROL HYDROCHLORIDE SCH MG: 1.25 SOLUTION RESPIRATORY (INHALATION) at 07:03

## 2018-01-19 RX ADMIN — LEVALBUTEROL HYDROCHLORIDE SCH MG: 1.25 SOLUTION RESPIRATORY (INHALATION) at 21:17

## 2018-01-19 RX ADMIN — SIMVASTATIN SCH MG: 20 TABLET, FILM COATED ORAL at 20:55

## 2018-01-19 RX ADMIN — MIRTAZAPINE SCH MG: 15 TABLET, FILM COATED ORAL at 20:58

## 2018-01-19 RX ADMIN — LISINOPRIL SCH MG: 10 TABLET ORAL at 20:54

## 2018-01-19 RX ADMIN — MORPHINE SULFATE PRN MG: 2 INJECTION, SOLUTION INTRAMUSCULAR; INTRAVENOUS at 21:07

## 2018-01-19 RX ADMIN — LISINOPRIL SCH MG: 10 TABLET ORAL at 09:30

## 2018-01-19 RX ADMIN — DILTIAZEM HYDROCHLORIDE SCH MG: 240 CAPSULE, EXTENDED RELEASE ORAL at 09:25

## 2018-01-19 RX ADMIN — TRAMADOL HYDROCHLORIDE PRN MG: 50 TABLET, COATED ORAL at 16:13

## 2018-01-19 RX ADMIN — CITALOPRAM HYDROBROMIDE SCH MG: 40 TABLET ORAL at 09:26

## 2018-01-19 RX ADMIN — INSULIN ASPART SCH UNITS: 100 INJECTION, SOLUTION INTRAVENOUS; SUBCUTANEOUS at 09:38

## 2018-01-19 RX ADMIN — ACETYLCYSTEINE SCH ML: 200 SOLUTION ORAL; RESPIRATORY (INHALATION) at 20:00

## 2018-01-19 RX ADMIN — STANDARDIZED SENNA CONCENTRATE AND DOCUSATE SODIUM SCH TAB: 8.6; 5 TABLET ORAL at 09:27

## 2018-01-19 RX ADMIN — NYSTATIN SCH ML: 100000 SUSPENSION ORAL at 13:16

## 2018-01-19 RX ADMIN — LATANOPROST SCH DROPS: 50 SOLUTION/ DROPS OPHTHALMIC at 22:29

## 2018-01-19 NOTE — PALLIATIVE CARE PROGRESS NOTE
Palliative Care Progress Note


Date of Service


Jan 19, 2018.





Subjective


Pt evaluation today including:  conversation w/ patient, conversation w/ family 

(daughters Kirti Miller and Shanta Urena), physical exam, chart review, conversation 

w/ consultant, review of inpatient medication list


Pain:  none


PO Intake:  tolerating diet


Voiding:  chambers catheter in place


-Patient is more awake today. Still SOB, requiring 8.5LPM via Oxymask.


-Discussed goals of care with patient. She wants her daughters to help her make 

decisions about transitioning to comfort care.


-I spoke with Kirti Miller and Shanta Urena on the phone See plan below.





Review of Systems


Constitutional:  + weakness


ENT:  No trouble swallowing


Respiratory:  + cough, + shortness of breath, + dyspnea on exertion, No sputum


Cardiac:  No chest pain, No edema


Abdomen:  No pain, No nausea, No vomiting


Female :  No problem reported


Psychiatric:  No depression symptoms, No anxiety





Objective


Vital Signs











  Date Time  Temp Pulse Resp B/P (MAP) Pulse Ox O2 Delivery O2 Flow Rate FiO2


 


1/19/18 08:35      Oxymask 8.0 


 


1/19/18 07:30 37.0 82 21 186/68 (107) 93 Room Air  


 


1/19/18 07:03  77 20  93 Mask 8.0 


 


1/19/18 02:05  82 20  93 Mask 8.0 


 


1/18/18 23:42      Oxymask 9.0 


 


1/18/18 23:30 37.0 80 16 145/74 (97) 93 Oxymask 9.0 


 


1/18/18 20:28  78 20  92 Mask 8.0 


 


1/18/18 16:50      Oxymask 8.0 





      BiPAP  


 


1/18/18 15:08 36.7 83 22 128/68 (88) 91 Oxyhood 8.0 


 


1/18/18 14:22  71 20  93 Mask 9.0 


 


1/18/18 13:08  82 21 166/63 (97) 92   


 


1/18/18 12:06  84 20 187/82 (117) 95 Oxymask 11.0 











Physical Exam


General Appearance:  no apparent distress


ENT:  hearing grossly normal


Neck:  supple, no JVD


Respiratory/Chest:  lungs clear, no respiratory distress, + accessory muscle use

, + rhonchi (coarse), + pertinent finding (tachypneic)


Cardiovascular:  regular rate, rhythm, no edema


Abdomen:  normal bowel sounds, non tender, soft


Neurologic/Psychiatric:  alert, normal mood/affect, oriented x 3


Skin:  + pertinent finding (face is slightly dusky)





Laboratory Results





Last 24 Hours








Test


  1/18/18


11:58 1/19/18


04:42 1/19/18


08:06


 


Bedside Glucose 180 mg/dl   120 mg/dl 


 


Prothrombin Time  46.0 SECONDS  


 


Prothromb Time International


Ratio 


  4.5 


  


 


 


Sodium Level  139 mmol/L  


 


Potassium Level  3.6 mmol/L  


 


Chloride Level  97 mmol/L  


 


Carbon Dioxide Level  40 mmol/L  


 


Anion Gap  2.0 mmol/L  


 


Blood Urea Nitrogen  18 mg/dl  


 


Creatinine  0.40 mg/dl  


 


Est Creatinine Clear Calc


Drug Dose 


  132.8 ml/min 


  


 


 


Estimated GFR (


American) 


  112.4 


  


 


 


Estimated GFR (Non-


American 


  97.0 


  


 


 


BUN/Creatinine Ratio  45.7  


 


Random Glucose  111 mg/dl  


 


Calcium Level  7.9 mg/dl  


 


Magnesium Level  2.1 mg/dl  











Assessment and Plan


Problem list:


Pain, left ankle and sacrum (from lying in bed)- none at this time


Weakness


SOB/BRANDT


COPD, advanced/end-stage


Acute on chronic respiratory failure


Left ankle fracture


Paroxysmal afib with RVR


Goals of care (Z51.5)





Palliative care recs: discussed with patient, patient's daughters Kirti Miller and 

Shanta Urena, and Dr. Zafar.


-Discussed with patient and daughters. Both daughters are on board with 

transitioning to comfort measures only and would be okay with hospice care upon 

discharge. Patient is not able to participate in therapy at this point and they 

do not believe patient will get any better. Patient agrees as well. Daughter 

Shanta is going to come and see her mom this weekend and conference call Kirti Miller in so they can discuss together and make a final decision. They will let 

the health care  team know when that happens.


-All care will essentially remain the same at this point if CMO, except I would 

discontinue lab draws and VS. Would change her IV morphine to Roxanol 5mg PO 

Q3h PRN pain or SOB. Would discontinue Zocor and keep cardiac meds as long as 

patient is able to swallow PO medications.


-Patient's daughters do not think they can take patient home with hospice. Will 

likely need SNF. Case management following.


Palliative Performance Scale:  30 % (unable to get OOB at this time)

## 2018-01-19 NOTE — PROGRESS NOTE
Internal Med Progress Note


Date of Service:


Jan 19, 2018.


Provider Documentation:





SUBJECTIVE:





Seen and examined at bedside


Clinically deteriorating


Discussed with Patient's daughters who would like make a decision tomorrow


Feels tired 


Continues to require 8 L of oxygen to maintain Sats


Reports chronic cough


Denies SOB, chest pain


INR supra therapeutic, no active bleeding issues 


Involved palliative Care


Intermittently confused per family








OBJECTIVE:





Vital Signs-as noted below


General Appearance:Moderately built and nourished, no apparent distress


Head:  normocephalic, Atraumatic 


Eyes:  normal inspection, EOMI, PERRL


Neck:  supple, Trachea midline


Respiratory/Chest: coarse breath sounds, B/L rhonchi


Cardiovascular: S1, S2, No murmur


Abdomen/GI:Soft, Non tender, Bowel sounds present


Extremities/Musculoskelatal:normal inspection, Left LE in cast  


Neurologic/Psych:grossly no focal neurological deficits 


Skin:  normal color, warm





Lab data as noted below.


ASSESSMENT & PLAN:





Acute hypercapnic respiratory failure 


Chronic Oxygen Dependency: 2-4 L at baseline 


Secondary to COPD exacerbation and complicated by Pneumonia


Advanced COPD


Could have a component of obesity hypoventilation syndrome


CXR showed dense bibasilar airspace consolidation, right greater than left with 

associated pleural effusions.


CTA: showed no evidence for PE. Dense bilateral lower lobe atelectasis/

consolidation. Patchy airspace


opacities are also present within the right upper lobe lingula and right middle 

lobe.


Completed Levaquin and Zosyn course


Negative influenza test 


IV Solumedrol>>> transitioned to PO prednisone


Continue Prednisone 40mg for now


Continue BIPAP QHS


Continue oxygen support


Blood Cultures: no growth


Appreciate Pulmonology Input


Lasix 20mg daily to help fluid overload 


ECHO


* The echocardiogram is extremely technically limited.


* Patient was sitting upright for the test.


* There is no significant pericardial effusion noted.


* The left ventricular systolic function is grossly normal on limited 

visualization.


* The right ventricular systolic function is grossly normal.


* The valves are poorly visualized.


* There is no significant valvular stenosis or regurgitation on technically 

limited Doppler evaluation.


Wean High Flow Oxygen as able and maintain saturation >88%


Clinically deteriorating


Palliative care following 


Continues to be BiPAP and high flow oxygen dependent  


Requires 8-10L oxygen to maintain Sats


Family to make decision tomorrow after discussing with patient regarding 

comfort measures








Oral Candidiasis:


Started on Nystatin Day #2








P. atrial flutter with RVR


Due to acute hypercapnic respiratory failure


Continue Cardizem for rate control


Monitor INR:3.9 >>>4.5


Coumadin on hold 








Hypokalemia/Hyponatremia: 


Hypokalemia likely secondary to poor oral intake and diuretics


Replace and monitor








Left Ankle fracture s/p fall 


Left ankle xray showed distal tibial and fibular fractures as above with mild 

lateral subluxation of the talus.


Appreciate Orthopedics Input 


Conservative management of the Ankle as high risk for surgery


Ankle brace provided  and will ask for Cast Boot for the Left foot


Toe Touch allowed 


Avoid full weight bearing for 6 weeks in total 








Right pelvic ring fracture: 


Pelvis xray showed an age indeterminant right pubic ring fracture, possibly 

chronic.


Stable








Hypertension:


labile likely related to anxiety


Continue lisinopril


monitor 








Ulcerative colitis


On Pentasa


Stable


no acute issue








Depression


On Celexa


Stable





DVT px 


INR supra therapeutic: Coumadin on hold


 





CODE STATUS


DNR 








DISPOSITION


Needs SNF placement Vs Home with Hospice


 consulted


Vital Signs:











  Date Time  Temp Pulse Resp B/P (MAP) Pulse Ox O2 Delivery O2 Flow Rate FiO2


 


1/19/18 15:22 36.5 73 20 132/67 (88) 94 Nasal Cannula 8.0 


 


1/19/18 14:26  76 20  95 Mask 8.0 


 


1/19/18 12:08  83 20 192/84 (120) 93   


 


1/19/18 08:35      Oxymask 8.0 


 


1/19/18 07:30 37.0 82 21 186/68 (107) 93 Room Air  


 


1/19/18 07:03  77 20  93 Mask 8.0 


 


1/19/18 02:05  82 20  93 Mask 8.0 


 


1/18/18 23:42      Oxymask 9.0 


 


1/18/18 23:30 37.0 80 16 145/74 (97) 93 Oxymask 9.0 


 


1/18/18 20:28  78 20  92 Mask 8.0 








Lab Results:





Results Past 24 Hours








Test


  1/19/18


04:42 1/19/18


08:06 1/19/18


12:03 1/19/18


16:57 Range/Units


 


 


Prothrombin Time


  46.0


  


  


  


  9.0-12.0


SECONDS


 


Prothromb Time International


Ratio 4.5


  


  


  


  0.9-1.1  


 


 


Sodium Level 139    136-145  mmol/L


 


Potassium Level 3.6    3.5-5.1  mmol/L


 


Chloride Level 97      mmol/L


 


Carbon Dioxide Level 40    21-32  mmol/L


 


Anion Gap 2.0    3-11  mmol/L


 


Blood Urea Nitrogen 18    7-18  mg/dl


 


Creatinine


  0.40


  


  


  


  0.60-1.20


mg/dl


 


Est Creatinine Clear Calc


Drug Dose 132.8


  


  


  


   ml/min


 


 


Estimated GFR (


American) 112.4


  


  


  


   


 


 


Estimated GFR (Non-


American 97.0


  


  


  


   


 


 


BUN/Creatinine Ratio 45.7    10-20  


 


Random Glucose 111    70-99  mg/dl


 


Calcium Level 7.9    8.5-10.1  mg/dl


 


Magnesium Level 2.1    1.8-2.4  mg/dl


 


Bedside Glucose  120 187 171 70-90  mg/dl

## 2018-01-20 VITALS
HEART RATE: 79 BPM | TEMPERATURE: 98.6 F | DIASTOLIC BLOOD PRESSURE: 71 MMHG | SYSTOLIC BLOOD PRESSURE: 184 MMHG | OXYGEN SATURATION: 92 %

## 2018-01-20 VITALS — HEART RATE: 68 BPM | OXYGEN SATURATION: 92 %

## 2018-01-20 VITALS — OXYGEN SATURATION: 91 % | HEART RATE: 73 BPM

## 2018-01-20 VITALS
TEMPERATURE: 97.88 F | DIASTOLIC BLOOD PRESSURE: 83 MMHG | HEART RATE: 86 BPM | OXYGEN SATURATION: 92 % | SYSTOLIC BLOOD PRESSURE: 172 MMHG

## 2018-01-20 VITALS — OXYGEN SATURATION: 93 % | HEART RATE: 66 BPM

## 2018-01-20 VITALS
DIASTOLIC BLOOD PRESSURE: 71 MMHG | SYSTOLIC BLOOD PRESSURE: 173 MMHG | HEART RATE: 65 BPM | TEMPERATURE: 98.24 F | OXYGEN SATURATION: 94 %

## 2018-01-20 VITALS — OXYGEN SATURATION: 92 %

## 2018-01-20 VITALS — OXYGEN SATURATION: 92 % | HEART RATE: 70 BPM

## 2018-01-20 LAB
BUN SERPL-MCNC: 18 MG/DL (ref 7–18)
CALCIUM SERPL-MCNC: 8.1 MG/DL (ref 8.5–10.1)
CO2 SERPL-SCNC: 39 MMOL/L (ref 21–32)
CREAT SERPL-MCNC: 0.34 MG/DL (ref 0.6–1.2)
EOSINOPHIL NFR BLD AUTO: 218 K/UL (ref 130–400)
GLUCOSE SERPL-MCNC: 102 MG/DL (ref 70–99)
HCT VFR BLD CALC: 31.6 % (ref 37–47)
HGB BLD-MCNC: 10.1 G/DL (ref 12–16)
INR PPP: 2.2 (ref 0.9–1.1)
MCH RBC QN AUTO: 29.7 PG (ref 25–34)
MCHC RBC AUTO-ENTMCNC: 32 G/DL (ref 32–36)
MCV RBC AUTO: 92.9 FL (ref 80–100)
NRBC BLD AUTO-RTO: 0.2 %
NUCLEATED RED BLOOD CELL ABS: 0.02 K/UL (ref 0–0)
PMV BLD AUTO: 9.5 FL (ref 7.4–10.4)
POTASSIUM SERPL-SCNC: 3.6 MMOL/L (ref 3.5–5.1)
RED CELL DISTRIBUTION WIDTH CV: 15.2 % (ref 11.5–14.5)
RED CELL DISTRIBUTION WIDTH SD: 51.6 FL (ref 36.4–46.3)
SODIUM SERPL-SCNC: 139 MMOL/L (ref 136–145)
WBC # BLD AUTO: 10.95 K/UL (ref 4.8–10.8)

## 2018-01-20 RX ADMIN — LEVALBUTEROL HYDROCHLORIDE SCH MG: 1.25 SOLUTION RESPIRATORY (INHALATION) at 19:46

## 2018-01-20 RX ADMIN — TRAMADOL HYDROCHLORIDE PRN MG: 50 TABLET, COATED ORAL at 10:10

## 2018-01-20 RX ADMIN — LEVALBUTEROL HYDROCHLORIDE SCH MG: 1.25 SOLUTION RESPIRATORY (INHALATION) at 02:03

## 2018-01-20 RX ADMIN — MESALAMINE SCH MG: 250 CAPSULE ORAL at 09:51

## 2018-01-20 RX ADMIN — LEVALBUTEROL HYDROCHLORIDE SCH MG: 1.25 SOLUTION RESPIRATORY (INHALATION) at 07:23

## 2018-01-20 RX ADMIN — NYSTATIN SCH ML: 100000 SUSPENSION ORAL at 19:45

## 2018-01-20 RX ADMIN — LISINOPRIL SCH MG: 10 TABLET ORAL at 09:52

## 2018-01-20 RX ADMIN — LATANOPROST SCH DROPS: 50 SOLUTION/ DROPS OPHTHALMIC at 19:48

## 2018-01-20 RX ADMIN — MESALAMINE SCH MG: 250 CAPSULE ORAL at 19:47

## 2018-01-20 RX ADMIN — ACETYLCYSTEINE SCH ML: 200 SOLUTION ORAL; RESPIRATORY (INHALATION) at 19:46

## 2018-01-20 RX ADMIN — MIRTAZAPINE SCH MG: 15 TABLET, FILM COATED ORAL at 19:47

## 2018-01-20 RX ADMIN — INSULIN ASPART SCH UNITS: 100 INJECTION, SOLUTION INTRAVENOUS; SUBCUTANEOUS at 09:55

## 2018-01-20 RX ADMIN — CITALOPRAM HYDROBROMIDE SCH MG: 40 TABLET ORAL at 09:52

## 2018-01-20 RX ADMIN — DILTIAZEM HYDROCHLORIDE SCH MG: 240 CAPSULE, EXTENDED RELEASE ORAL at 09:52

## 2018-01-20 RX ADMIN — ACETYLCYSTEINE SCH ML: 200 SOLUTION ORAL; RESPIRATORY (INHALATION) at 07:22

## 2018-01-20 RX ADMIN — NYSTATIN SCH ML: 100000 SUSPENSION ORAL at 13:36

## 2018-01-20 RX ADMIN — STANDARDIZED SENNA CONCENTRATE AND DOCUSATE SODIUM SCH TAB: 8.6; 5 TABLET ORAL at 09:53

## 2018-01-20 RX ADMIN — PANTOPRAZOLE SCH MG: 40 TABLET, DELAYED RELEASE ORAL at 09:51

## 2018-01-20 RX ADMIN — NYSTATIN SCH ML: 100000 SUSPENSION ORAL at 09:53

## 2018-01-20 RX ADMIN — NYSTATIN SCH ML: 100000 SUSPENSION ORAL at 17:15

## 2018-01-20 RX ADMIN — FUROSEMIDE SCH MG: 20 TABLET ORAL at 09:51

## 2018-01-20 RX ADMIN — LEVALBUTEROL HYDROCHLORIDE SCH MG: 1.25 SOLUTION RESPIRATORY (INHALATION) at 14:45

## 2018-01-20 RX ADMIN — INSULIN ASPART SCH UNITS: 100 INJECTION, SOLUTION INTRAVENOUS; SUBCUTANEOUS at 12:00

## 2018-01-20 NOTE — PROGRESS NOTE
Internal Med Progress Note


Date of Service:


Jan 20, 2018.


Provider Documentation:





SUBJECTIVE:





Seen and examined at bedside


Patient expressed that she wanted all the active medications be stopped and be 

on comfort measures only


"Let nature take its course"  


Discussed with daughter Shanta and she is in agreement with the plan


Clinically deteriorating


Very poor prognosis


Continues to require 8 L of oxygen to maintain Sats


Reports chronic cough


Denies pain


Oriented X 3


States "I feel cold"





OBJECTIVE:





Vital Signs-as noted below


General Appearance:Moderately built and nourished, no apparent distress


Head:  normocephalic, Atraumatic 


Eyes:  normal inspection, EOMI, PERRL


Neck:  supple, Trachea midline


Respiratory/Chest: coarse breath sounds, B/L rhonchi


Cardiovascular: S1, S2, No murmur


Abdomen/GI:Soft, Non tender, Bowel sounds present


Extremities/Musculoskelatal:normal inspection, Left LE in cast  


Neurologic/Psych:grossly no focal neurological deficits 


Skin:  normal color, warm





Lab data as noted below.


ASSESSMENT & PLAN:





Acute hypercapnic respiratory failure 


Chronic Oxygen Dependency: 2-4 L at baseline 


Secondary to COPD exacerbation and complicated by Pneumonia


Advanced COPD


Could have a component of obesity hypoventilation syndrome


CXR showed dense bibasilar airspace consolidation, right greater than left with 

associated pleural effusions.


CTA: showed no evidence for PE. Dense bilateral lower lobe atelectasis/

consolidation. Patchy airspace


opacities are also present within the right upper lobe lingula and right middle 

lobe.


Completed Levaquin and Zosyn course


Negative influenza test 


IV Solumedrol>>> transitioned to PO prednisone


On BIPAP QHS


Continue oxygen support


Blood Cultures: no growth


Appreciate Pulmonology Input


Lasix 20mg daily to help fluid overload 


ECHO


* The echocardiogram is extremely technically limited.


* Patient was sitting upright for the test.


* There is no significant pericardial effusion noted.


* The left ventricular systolic function is grossly normal on limited 

visualization.


* The right ventricular systolic function is grossly normal.


* The valves are poorly visualized.


* There is no significant valvular stenosis or regurgitation on technically 

limited Doppler evaluation.


Clinically deteriorating


Palliative care following 


Patient prefers to be on comfort measures only


Oxygen support for comfort


Discussed with family who agrees with current management





Oral Candidiasis:


Nystatin Day #3








P. atrial flutter with RVR


Due to acute hypercapnic respiratory failure


Continue Cardizem for rate control


Monitor INR:3.9 >>>4.5


Coumadin on hold 








Hypokalemia/Hyponatremia: 


Hypokalemia likely secondary to poor oral intake and diuretics


Replace and monitor








Left Ankle fracture s/p fall 


Left ankle xray showed distal tibial and fibular fractures as above with mild 

lateral subluxation of the talus.


Appreciate Orthopedics Input 


Conservative management of the Ankle as high risk for surgery


Ankle brace provided  and will ask for Cast Boot for the Left foot


Toe Touch allowed 


Avoid full weight bearing for 6 weeks in total 








Right pelvic ring fracture: 


Pelvis xray showed an age indeterminant right pubic ring fracture, possibly 

chronic.


Stable








Hypertension:


labile likely related to anxiety


was on lisinopril


monitor 








Ulcerative colitis


On Pentasa


Stable


no acute issue








Depression


On Celexa


Stable





DVT px 


INR supra therapeutic: Coumadin on hold


 





CODE STATUS


DNR 








DISPOSITION


On comfort measures only


Very Poor prognosis


Patient prefers to be on comfort measures only


Palliative care following


Vital Signs:











  Date Time  Temp Pulse Resp B/P (MAP) Pulse Ox O2 Delivery O2 Flow Rate FiO2


 


1/20/18 15:08 36.6 86 21 172/83 (112) 92 Mask 8.0 


 


1/20/18 14:45      Oxymask 8.0 


 


1/20/18 14:45  66 20  93 Mask 8.0 


 


1/20/18 07:52      Oxymask 8.0 


 


1/20/18 07:41  70 20  92 Mask 8.0 


 


1/20/18 07:35 37.0 79 20 184/71 (108) 92 Mask 8.0 


 


1/20/18 03:45     92 Oxymask 8.0 


 


1/20/18 02:03  73 20  91 Mask 8.0 


 


1/19/18 23:20     96 Oxymask 8.0 


 


1/19/18 23:15 36.7 71 18 136/63 (87) 95 Oxymask 8.0 


 


1/19/18 21:17  80 20  94 Mask 8.0 


 


1/19/18 17:20      Nasal Cannula 8.0 








Lab Results:





Results Past 24 Hours








Test


  1/19/18


16:57 1/19/18


20:53 1/20/18


05:14 1/20/18


08:27 Range/Units


 


 


Bedside Glucose 171 196  157 70-90  mg/dl


 


White Blood Count   10.95  4.8-10.8  K/uL


 


Red Blood Count   3.40  4.2-5.4  M/uL


 


Hemoglobin   10.1  12.0-16.0  g/dL


 


Hematocrit   31.6  37-47  %


 


Mean Corpuscular Volume   92.9    fL


 


Mean Corpuscular Hemoglobin   29.7  25-34  pg


 


Mean Corpuscular Hemoglobin


Concent 


  


  32.0


  


  32-36  g/dl


 


 


RDW Standard Deviation   51.6  36.4-46.3  fL


 


RDW Coefficient of Variation   15.2  11.5-14.5  %


 


Platelet Count   218  130-400  K/uL


 


Mean Platelet Volume   9.5  7.4-10.4  fL


 


Nucleated RBC Absolute Count


(auto) 


  


  0.02


  


  0-0  K/uL


 


 


Nucleated Red Blood Cells %   0.2   %


 


Prothrombin Time


  


  


  22.7


  


  9.0-12.0


SECONDS


 


Prothromb Time International


Ratio 


  


  2.2


  


  0.9-1.1  


 


 


Sodium Level   139  136-145  mmol/L


 


Potassium Level   3.6  3.5-5.1  mmol/L


 


Chloride Level   95    mmol/L


 


Carbon Dioxide Level   39  21-32  mmol/L


 


Anion Gap   5.0  3-11  mmol/L


 


Blood Urea Nitrogen   18  7-18  mg/dl


 


Creatinine


  


  


  0.34


  


  0.60-1.20


mg/dl


 


Est Creatinine Clear Calc


Drug Dose 


  


  156.2


  


   ml/min


 


 


Estimated GFR (


American) 


  


  118.6


  


   


 


 


Estimated GFR (Non-


American 


  


  102.3


  


   


 


 


BUN/Creatinine Ratio   52.5  10-20  


 


Random Glucose   102  70-99  mg/dl


 


Calcium Level   8.1  8.5-10.1  mg/dl


 


Magnesium Level   2.3  1.8-2.4  mg/dl


 


Test


  1/20/18


12:10 


  


  


  Range/Units


 


 


Bedside Glucose 98    70-90  mg/dl

## 2018-01-21 VITALS
HEART RATE: 64 BPM | TEMPERATURE: 98.24 F | SYSTOLIC BLOOD PRESSURE: 175 MMHG | DIASTOLIC BLOOD PRESSURE: 76 MMHG | OXYGEN SATURATION: 95 %

## 2018-01-21 VITALS — HEART RATE: 61 BPM | OXYGEN SATURATION: 93 %

## 2018-01-21 VITALS — HEART RATE: 84 BPM | DIASTOLIC BLOOD PRESSURE: 88 MMHG | TEMPERATURE: 97.52 F | SYSTOLIC BLOOD PRESSURE: 187 MMHG

## 2018-01-21 VITALS — OXYGEN SATURATION: 93 %

## 2018-01-21 VITALS — HEART RATE: 94 BPM | OXYGEN SATURATION: 79 %

## 2018-01-21 VITALS — OXYGEN SATURATION: 76 % | HEART RATE: 92 BPM

## 2018-01-21 VITALS — OXYGEN SATURATION: 90 %

## 2018-01-21 RX ADMIN — MESALAMINE SCH MG: 250 CAPSULE ORAL at 22:46

## 2018-01-21 RX ADMIN — LEVALBUTEROL HYDROCHLORIDE SCH MG: 1.25 SOLUTION RESPIRATORY (INHALATION) at 01:40

## 2018-01-21 RX ADMIN — STANDARDIZED SENNA CONCENTRATE AND DOCUSATE SODIUM SCH TAB: 8.6; 5 TABLET ORAL at 08:10

## 2018-01-21 RX ADMIN — MESALAMINE SCH MG: 250 CAPSULE ORAL at 08:09

## 2018-01-21 RX ADMIN — PANTOPRAZOLE SCH MG: 40 TABLET, DELAYED RELEASE ORAL at 08:09

## 2018-01-21 RX ADMIN — ACETYLCYSTEINE SCH ML: 200 SOLUTION ORAL; RESPIRATORY (INHALATION) at 07:39

## 2018-01-21 RX ADMIN — LEVALBUTEROL HYDROCHLORIDE SCH MG: 1.25 SOLUTION RESPIRATORY (INHALATION) at 20:39

## 2018-01-21 RX ADMIN — SCOPALAMINE SCH MG: 1 PATCH, EXTENDED RELEASE TRANSDERMAL at 13:31

## 2018-01-21 RX ADMIN — LEVALBUTEROL HYDROCHLORIDE SCH MG: 1.25 SOLUTION RESPIRATORY (INHALATION) at 14:16

## 2018-01-21 RX ADMIN — LATANOPROST SCH DROPS: 50 SOLUTION/ DROPS OPHTHALMIC at 22:46

## 2018-01-21 RX ADMIN — MIRTAZAPINE SCH MG: 15 TABLET, FILM COATED ORAL at 22:46

## 2018-01-21 RX ADMIN — NYSTATIN SCH ML: 100000 SUSPENSION ORAL at 18:29

## 2018-01-21 RX ADMIN — ACETYLCYSTEINE SCH ML: 200 SOLUTION ORAL; RESPIRATORY (INHALATION) at 20:35

## 2018-01-21 RX ADMIN — NYSTATIN SCH ML: 100000 SUSPENSION ORAL at 09:56

## 2018-01-21 RX ADMIN — NYSTATIN SCH ML: 100000 SUSPENSION ORAL at 22:45

## 2018-01-21 RX ADMIN — LEVALBUTEROL HYDROCHLORIDE SCH MG: 1.25 SOLUTION RESPIRATORY (INHALATION) at 07:39

## 2018-01-21 RX ADMIN — NYSTATIN SCH ML: 100000 SUSPENSION ORAL at 12:40

## 2018-01-21 NOTE — PROGRESS NOTE
Internal Med Progress Note


Date of Service:


Jan 21, 2018.


Provider Documentation:





SUBJECTIVE:





Seen and examined at bedside


No distress on my exam


Denies any pain


On Oxygen for comfort


On comfort measures only per patient's wishes


Very poor prognosis








OBJECTIVE:





Vital Signs-as noted below


General Appearance:Moderately built and nourished, no apparent distress


Head:  normocephalic, Atraumatic 


Eyes:  normal inspection, EOMI, PERRL


Neck:  supple, Trachea midline


Respiratory/Chest: coarse breath sounds


Abdomen/GI:Soft, Non tender, Bowel sounds present


Extremities/Musculoskelatal:normal inspection, Left LE in cast  


Neurologic/Psych:grossly no focal neurological deficits 


Skin:  normal color, warm





Lab data as noted below.


ASSESSMENT & PLAN:





Acute hypercapnic respiratory failure 


Chronic Oxygen Dependency: 2-4 L at baseline 


Secondary to COPD exacerbation and complicated by Pneumonia


Advanced COPD


Could have a component of obesity hypoventilation syndrome


CXR showed dense bibasilar airspace consolidation, right greater than left with 

associated pleural effusions.


CTA: showed no evidence for PE. Dense bilateral lower lobe atelectasis/

consolidation. Patchy airspace


opacities are also present within the right upper lobe lingula and right middle 

lobe.


Completed Levaquin and Zosyn course


Negative influenza test 


IV Solumedrol>>> transitioned to PO prednisone


On BIPAP QHS


Continue oxygen support


Blood Cultures: no growth


Appreciate Pulmonology Input


Lasix 20mg daily to help fluid overload 


ECHO


* The echocardiogram is extremely technically limited.


* Patient was sitting upright for the test.


* There is no significant pericardial effusion noted.


* The left ventricular systolic function is grossly normal on limited 

visualization.


* The right ventricular systolic function is grossly normal.


* The valves are poorly visualized.


* There is no significant valvular stenosis or regurgitation on technically 

limited Doppler evaluation.


Clinically deteriorating


Palliative care following 


Patient on comfort measures only


Oxygen support for comfort


Discussed with family who agrees with current management





Oral Candidiasis:


Nystatin Day #4








P. atrial flutter with RVR


Due to acute hypercapnic respiratory failure


Continue Cardizem for rate control


Monitor INR:3.9 >>>4.5


Coumadin on hold 








Hypokalemia/Hyponatremia: 


Hypokalemia likely secondary to poor oral intake and diuretics








Left Ankle fracture s/p fall 


Left ankle xray showed distal tibial and fibular fractures as above with mild 

lateral subluxation of the talus.


Appreciate Orthopedics Input 


Conservative management of the Ankle as high risk for surgery


Ankle brace provided  and will ask for Cast Boot for the Left foot


Toe Touch allowed 


Avoid full weight bearing for 6 weeks in total 








Right pelvic ring fracture: 


Pelvis xray showed an age indeterminant right pubic ring fracture, possibly 

chronic.


Stable








Hypertension:


labile likely related to anxiety


was on lisinopril


monitor 








Ulcerative colitis


On Pentasa


Stable


no acute issue








Depression


was on Celexa


Stable





DVT px 


INR supra therapeutic: Coumadin on hold


 





CODE STATUS


DNR 








DISPOSITION


On comfort measures only


Very Poor prognosis


Palliative care following


Needs placement


Vital Signs:











  Date Time  Temp Pulse Resp B/P (MAP) Pulse Ox O2 Delivery O2 Flow Rate FiO2


 


1/21/18 14:16  94 20  79 Room Air  


 


1/21/18 08:00     90 Oxymask 8.0 


 


1/21/18 07:44 36.4 84 20 202/76 (118)    





    187/88 (121)    


 


1/21/18 07:39  92 20  76 Room Air  


 


1/21/18 01:40  61 20  93 Mask 8.0 


 


1/21/18 00:00      Oxymask 8.0 


 


1/20/18 23:16 36.8 65 20 173/71 (105) 94 Mask 8.0 


 


1/20/18 20:00      Oxymask 8.0 


 


1/20/18 19:48  68 20  92 Mask 8.0

## 2018-01-22 VITALS
HEART RATE: 85 BPM | SYSTOLIC BLOOD PRESSURE: 195 MMHG | DIASTOLIC BLOOD PRESSURE: 88 MMHG | OXYGEN SATURATION: 96 % | TEMPERATURE: 98.06 F

## 2018-01-22 VITALS — OXYGEN SATURATION: 93 %

## 2018-01-22 RX ADMIN — MESALAMINE SCH MG: 250 CAPSULE ORAL at 07:53

## 2018-01-22 RX ADMIN — STANDARDIZED SENNA CONCENTRATE AND DOCUSATE SODIUM SCH TAB: 8.6; 5 TABLET ORAL at 07:53

## 2018-01-22 RX ADMIN — MIRTAZAPINE SCH MG: 15 TABLET, FILM COATED ORAL at 20:09

## 2018-01-22 RX ADMIN — LATANOPROST SCH DROPS: 50 SOLUTION/ DROPS OPHTHALMIC at 20:09

## 2018-01-22 RX ADMIN — NYSTATIN SCH ML: 100000 SUSPENSION ORAL at 20:09

## 2018-01-22 RX ADMIN — ACETYLCYSTEINE SCH ML: 200 SOLUTION ORAL; RESPIRATORY (INHALATION) at 08:00

## 2018-01-22 RX ADMIN — NYSTATIN SCH ML: 100000 SUSPENSION ORAL at 07:53

## 2018-01-22 RX ADMIN — MESALAMINE SCH MG: 250 CAPSULE ORAL at 20:08

## 2018-01-22 RX ADMIN — LEVALBUTEROL HYDROCHLORIDE SCH MG: 1.25 SOLUTION RESPIRATORY (INHALATION) at 01:45

## 2018-01-22 RX ADMIN — NYSTATIN SCH ML: 100000 SUSPENSION ORAL at 17:54

## 2018-01-22 RX ADMIN — PANTOPRAZOLE SCH MG: 40 TABLET, DELAYED RELEASE ORAL at 07:53

## 2018-01-22 RX ADMIN — MORPHINE SULFATE PRN MG: 20 SOLUTION ORAL at 13:59

## 2018-01-22 RX ADMIN — LEVALBUTEROL HYDROCHLORIDE SCH MG: 1.25 SOLUTION RESPIRATORY (INHALATION) at 08:43

## 2018-01-22 RX ADMIN — NYSTATIN SCH ML: 100000 SUSPENSION ORAL at 10:56

## 2018-01-22 NOTE — PROGRESS NOTE
Internal Med Progress Note


Date of Service:


Jan 22, 2018.


Provider Documentation:





SUBJECTIVE:





Seen and examined at bedside


No distress noted


Lethargic but easily awakes


Denies any pain


On Oxygen for comfort


On comfort measures only per patient's wishes


Very poor prognosis








OBJECTIVE:





Vital Signs-as noted below


General Appearance:Moderately built and nourished, no apparent distress


Head:  normocephalic, Atraumatic 


Eyes:  normal inspection, EOMI, PERRL


Neck:  supple, Trachea midline


Respiratory/Chest: coarse breath sounds


Abdomen/GI:Soft, Non tender, Bowel sounds present


Extremities/Musculoskelatal:normal inspection, Left LE in cast  


Neurologic/Psych:grossly no focal neurological deficits 


Skin:  normal color, warm





Lab data as noted below.


ASSESSMENT & PLAN:





Acute hypercapnic respiratory failure 


Chronic Oxygen Dependency: 2-4 L at baseline 


Secondary to COPD exacerbation and complicated by Pneumonia


Advanced COPD


Could have a component of obesity hypoventilation syndrome


CXR showed dense bibasilar airspace consolidation, right greater than left with 

associated pleural effusions.


CTA: showed no evidence for PE. Dense bilateral lower lobe atelectasis/

consolidation. Patchy airspace


opacities are also present within the right upper lobe lingula and right middle 

lobe.


Completed Levaquin and Zosyn course


Negative influenza test 


IV Solumedrol>>> transitioned to PO prednisone


On BIPAP QHS


Continue oxygen support


Blood Cultures: no growth


Appreciate Pulmonology Input


Lasix 20mg daily to help fluid overload 


ECHO


* The echocardiogram is extremely technically limited.


* Patient was sitting upright for the test.


* There is no significant pericardial effusion noted.


* The left ventricular systolic function is grossly normal on limited 

visualization.


* The right ventricular systolic function is grossly normal.


* The valves are poorly visualized.


* There is no significant valvular stenosis or regurgitation on technically 

limited Doppler evaluation.


Clinically deteriorating


Palliative care following 


Oxygen support for comfort


Discussed with family who agrees with current management


Patient on comfort measures only








Oral Candidiasis:


Nystatin Day #5








P. atrial flutter with RVR


Due to acute hypercapnic respiratory failure


Continue Cardizem for rate control


Monitor INR:3.9 >>>4.5>>>2.2


Coumadin on hold 








Hypokalemia/Hyponatremia: 


Hypokalemia likely secondary to poor oral intake and diuretics








Left Ankle fracture s/p fall 


Left ankle xray showed distal tibial and fibular fractures as above with mild 

lateral subluxation of the talus.


Appreciate Orthopedics Input 


Conservative management of the Ankle as high risk for surgery


Ankle brace provided  and will ask for Cast Boot for the Left foot


Toe Touch allowed 


Avoid full weight bearing for 6 weeks in total 








Right pelvic ring fracture: 


Pelvis xray showed an age indeterminant right pubic ring fracture, possibly 

chronic.


Stable








Hypertension:


labile likely related to anxiety


was on lisinopril


monitor 








Ulcerative colitis


On Pentasa


Stable


no acute issue








Depression


was on Celexa


Stable





DVT px 


INR supra therapeutic: Coumadin on hold


 





CODE STATUS


DNR 








DISPOSITION


On comfort measures only


Very Poor prognosis


Palliative care following


Needs placement


Vital Signs:











  Date Time  Temp Pulse Resp B/P (MAP) Pulse Ox O2 Delivery O2 Flow Rate FiO2


 


1/22/18 08:00     93 Oxymask 8.0 85


 


1/22/18 00:00      Oxymask 8.0 


 


1/21/18 23:10 36.8 64 22 175/76 (109) 95 Mask 9.0 


 


1/21/18 20:00      Oxymask 8.0 


 


1/21/18 16:00     93 Oxymask 8.0 


 


1/21/18 14:16  94 20  79 Room Air

## 2018-01-23 VITALS
OXYGEN SATURATION: 92 % | DIASTOLIC BLOOD PRESSURE: 74 MMHG | HEART RATE: 66 BPM | SYSTOLIC BLOOD PRESSURE: 199 MMHG | TEMPERATURE: 97.7 F

## 2018-01-23 VITALS — OXYGEN SATURATION: 94 %

## 2018-01-23 VITALS
OXYGEN SATURATION: 94 % | DIASTOLIC BLOOD PRESSURE: 79 MMHG | TEMPERATURE: 98.24 F | SYSTOLIC BLOOD PRESSURE: 196 MMHG | HEART RATE: 79 BPM

## 2018-01-23 VITALS — OXYGEN SATURATION: 96 %

## 2018-01-23 RX ADMIN — LATANOPROST SCH DROPS: 50 SOLUTION/ DROPS OPHTHALMIC at 19:55

## 2018-01-23 RX ADMIN — TRAMADOL HYDROCHLORIDE PRN MG: 50 TABLET, COATED ORAL at 07:53

## 2018-01-23 RX ADMIN — NYSTATIN SCH ML: 100000 SUSPENSION ORAL at 19:54

## 2018-01-23 RX ADMIN — MIRTAZAPINE SCH MG: 15 TABLET, FILM COATED ORAL at 19:57

## 2018-01-23 RX ADMIN — PANTOPRAZOLE SCH MG: 40 TABLET, DELAYED RELEASE ORAL at 07:54

## 2018-01-23 RX ADMIN — NYSTATIN SCH ML: 100000 SUSPENSION ORAL at 15:05

## 2018-01-23 RX ADMIN — MORPHINE SULFATE PRN MG: 20 SOLUTION ORAL at 16:43

## 2018-01-23 RX ADMIN — MESALAMINE SCH MG: 250 CAPSULE ORAL at 07:54

## 2018-01-23 RX ADMIN — STANDARDIZED SENNA CONCENTRATE AND DOCUSATE SODIUM SCH TAB: 8.6; 5 TABLET ORAL at 07:53

## 2018-01-23 RX ADMIN — NYSTATIN SCH ML: 100000 SUSPENSION ORAL at 17:21

## 2018-01-23 RX ADMIN — MESALAMINE SCH MG: 250 CAPSULE ORAL at 19:57

## 2018-01-23 RX ADMIN — NYSTATIN SCH ML: 100000 SUSPENSION ORAL at 07:54

## 2018-01-23 NOTE — PALLIATIVE CARE PROGRESS NOTE
Palliative Care Progress Note


Date of Service


Jan 23, 2018.





Subjective


Pt evaluation today including:  conversation w/ patient


Met with patient today. She is stable on current medications and in my opinion 

is stable for transfer out of hospital.  following and working on 

placement to [facility]. I will sign off for now but please don't hesitate to 

contact me with any further palliative care needs.

## 2018-01-23 NOTE — PROGRESS NOTE
Medicine Progress Note


Date & Time of Visit:


Jan 23, 2018 at 17:12.


Subjective


Patient reports feeling ok, denies any complaints of pain. No overnight events 

noted. Tolerating PO. No family at the bedside. Reports her appetite is fair.





Objective





Last 8 Hrs








  Date Time  Temp Pulse Resp B/P (MAP) Pulse Ox O2 Delivery O2 Flow Rate FiO2


 


1/23/18 16:00      Nasal Cannula 4.0 


 


1/23/18 15:08 36.5 66 14 199/74 (115) 92 Nasal Cannula 4.0 





    199/85 (123)    








Physical Exam:


GENERAL: Patient is in no acute distress.


HEENT: No acute trauma, normocephalic, mucous membranes moist, no nasal 

congestion, no scleral icterus.


NECK: No stridor, trachea is midline.


LUNGS: Coarse breath sounds bilaterally, no wheeze, no rhonchi, breath sounds 

equal.


HEART: Without murmurs gallops or rubs, regular rate and rhythm.


ABDOMEN: Soft, nontender, bowel sounds positive, no hepatosplenomegaly 


EXTREMITIES: No cyanosis or edema, limited range of motion, generalized weakness

, LLE in splint


NEUROLOGIC: Oriented, no acute motor or sensory deficits, no focal weakness.


SKIN: No rash, no jaundice, no diaphoresis.





Assessment & Plan


ACUTE ON CHRONIC RESPIRATORY FAILURE: with hypercapnia


-Chronic Oxygen Dependency: 2-4 L at baseline 


-Acute is likely secondary to COPD exacerbation and complicated by Pneumonia


-has advanced COPD at baseline


-could also have a component of obesity hypoventilation syndrome


-CXR showed dense bibasilar airspace consolidation, right greater than left 

with associated pleural effusions.


-CTA: showed no evidence for PE. Dense bilateral lower lobe atelectasis/

consolidation. Patchy airspace opacities are also present within the right 

upper lobe lingula and right middle lobe.


-Completed Levaquin and Zosyn course


-Negative influenza test 


-IV Solumedrol initially transitioned to PO prednisone


-BIPAP qHS and PRN


-continue oxygen support


-blood cultures: no growth


-Pulm consulted, appreciated recs 


-was given Lasix 20mg daily to help fluid overload 


-TTE Report: 


            The echocardiogram is extremely technically limited.


   Patient was sitting upright for the test.


   There is no significant pericardial effusion noted.


   The left ventricular systolic function is grossly normal on limited 

visualization.


   The right ventricular systolic function is grossly normal.


   The valves are poorly visualized.


   There is no significant valvular stenosis or regurgitation on technically 

limited Doppler evaluation.


-due to ongoing deterioration, Palliative care consulted, patient was made 

comfort measures per discussion of previous attending with the patient and her 

family


-Oxygen support for comfort


-family agrees with current management


-remains on comfort measures only





ORAL CANDIDIASIS:


-on Nystatin Day #6





PAROXYSMAL ATRIAL FLUTTER: with RVR


Due to acute hypercapnic respiratory failure


Continue Cardizem for rate control


Monitor INR:3.9 >>>4.5>>>2.2


Coumadin on hold as comfort measure only





ELECTROLYTE DERANGEMENT: Hypokalemia/Hyponatremia: 


-likely secondary to poor oral intake and diuretics





LEFT ANKLE FRACTURE: s/p fall 


-Left ankle xray showed distal tibial and fibular fractures as above with mild 

lateral subluxation of the talus.


-Orthopedics consulted, appreciate input 


-Conservative management of the Ankle as high risk for surgery; s/p cast boot 

for the Left foot


-Toe Touch allowed 


-Avoid full weight bearing for 6 weeks in total 





RIGHT PELVIC RING FRACTURE: 


-Pelvis xray showed an age indeterminant right pubic ring fracture, possibly 

chronic.


-pain control


-PT/OT as tolerated





HTN:


-labile likely related to anxiety


-was on lisinopril


-monitor 





ULCERATIVE COLITIS:


-continued on Pentasa


-no acute issues





DEPRESSION:


-continue on Celexa


-stable


Consultants:


Pulmonary


Current Inpatient Medications:





Current Inpatient Medications








 Medications


  (Trade)  Dose


 Ordered  Sig/Donna


 Route  Start Time


 Stop Time Status Last Admin


Dose Admin


 


 Tramadol HCl


  (Ultram Tab)  50 mg  Q6H  PRN


 PO  1/1/18 21:30


 1/31/18 21:29  1/23/18 07:53


50 MG


 


 Latanoprost


  (Xalatan Oph


 Soln)  1 drops  HS


 OP  1/2/18 21:00


 2/1/18 20:59  1/22/18 20:09


1 DROPS


 


 Mesalamine


  (Pentasa


 Controlled Rel


 Cap)  1,000 mg  BID


 PO  1/2/18 09:00


 2/1/18 08:59  1/23/18 07:54


1,000 MG


 


 Mirtazapine


  (Remeron Tab)  15 mg  HS


 PO  1/2/18 21:00


 2/1/18 20:59  1/22/18 20:09


15 MG


 


 Pantoprazole


 Sodium


  (Protonix Tab)  40 mg  DAILY


 PO  1/2/18 09:00


 2/1/18 08:59  1/23/18 07:54


40 MG


 


 Senna/Docusate


 Sodium


  (Senokot S Tab)  1 tab  DAILY


 PO  1/2/18 09:00


 2/1/18 08:59  1/23/18 07:53


1 TAB


 


 Acetaminophen


  (Tylenol Tab)  650 mg  Q4H  PRN


 PO  1/1/18 21:45


 1/31/18 21:44  1/10/18 17:23


650 MG


 


 Ondansetron HCl


  (Zofran Inj)  4 mg  Q6H  PRN


 IV  1/1/18 21:45


 1/31/18 21:44   


 


 


 Miconazole Nitrate


  (Desenex Powder)  1 appln  TID  PRN


 EXT  1/16/18 17:15


 2/15/18 17:14  1/17/18 21:14


1 APPLN


 


 Nystatin


  (Mycostatin Susp)  5 ml  QID


 PO  1/18/18 17:00


 1/28/18 16:59  1/23/18 07:54


5 ML


 


 Morphine Sulfate


  (Roxanol Oral


 Soln)  5 mg  Q3H  PRN


 PO  1/20/18 17:15


 2/3/18 17:14  1/23/18 16:43


5 MG


 


 Scopolamine


  (Transderm-Scop


 Patch)  1.5 mg  Q72H


 TD  1/21/18 12:45


 2/20/18 12:44  1/21/18 13:31


1.5 MG


 


 Miscellaneous


  (Remove


 Transderm-Scop


 Patch)  1 ea  Q72H


 N/A  1/24/18 12:45


 2/23/18 12:44   


 


 


 Miscellaneous


 Information


  (Check


 Scopolamine Patch


 Placement)  1 ea  QS


 N/A  1/21/18 16:00


 2/20/18 15:59  1/23/18 16:25


1 EA


 


 Al Hydrox/Mg


 Hydrox/Simethicone


  (Maalox Max Susp)  15 ml  Q6H  PRN


 PO  1/21/18 16:30


 2/20/18 16:29   


 


 


 Lorazepam


  (Ativan Tab)  1 mg  Q4H  PRN


 PO  1/22/18 14:15


 2/21/18 14:14

## 2018-01-24 VITALS
TEMPERATURE: 97.7 F | HEART RATE: 68 BPM | DIASTOLIC BLOOD PRESSURE: 97 MMHG | SYSTOLIC BLOOD PRESSURE: 203 MMHG | OXYGEN SATURATION: 93 %

## 2018-01-24 RX ADMIN — MESALAMINE SCH MG: 250 CAPSULE ORAL at 11:43

## 2018-01-24 RX ADMIN — NYSTATIN SCH ML: 100000 SUSPENSION ORAL at 11:44

## 2018-01-24 RX ADMIN — NYSTATIN SCH ML: 100000 SUSPENSION ORAL at 18:19

## 2018-01-24 RX ADMIN — MESALAMINE SCH MG: 250 CAPSULE ORAL at 19:51

## 2018-01-24 RX ADMIN — PANTOPRAZOLE SCH MG: 40 TABLET, DELAYED RELEASE ORAL at 11:43

## 2018-01-24 RX ADMIN — NYSTATIN SCH ML: 100000 SUSPENSION ORAL at 08:00

## 2018-01-24 RX ADMIN — LATANOPROST SCH DROPS: 50 SOLUTION/ DROPS OPHTHALMIC at 19:51

## 2018-01-24 RX ADMIN — MIRTAZAPINE SCH MG: 15 TABLET, FILM COATED ORAL at 19:51

## 2018-01-24 RX ADMIN — LORAZEPAM PRN MG: 1 TABLET ORAL at 14:37

## 2018-01-24 RX ADMIN — NYSTATIN SCH ML: 100000 SUSPENSION ORAL at 19:51

## 2018-01-24 RX ADMIN — STANDARDIZED SENNA CONCENTRATE AND DOCUSATE SODIUM SCH TAB: 8.6; 5 TABLET ORAL at 11:43

## 2018-01-24 RX ADMIN — SCOPALAMINE SCH MG: 1 PATCH, EXTENDED RELEASE TRANSDERMAL at 11:41

## 2018-01-24 RX ADMIN — LORAZEPAM PRN MG: 1 TABLET ORAL at 19:55

## 2018-01-24 NOTE — PROGRESS NOTE
Medicine Progress Note


Date & Time of Visit:


Jan 24, 2018 at 19:22.


Subjective


Patient denies any complaints. Tolerating PO. Denies any pain or SOB. No family 

present at the time of evaluation. No overnight events noted.





Objective





Last 8 Hrs








  Date Time  Temp Pulse Resp B/P (MAP) Pulse Ox O2 Delivery O2 Flow Rate FiO2


 


1/24/18 16:00      Nasal Cannula 4.0 


 


1/24/18 15:25 36.5 68 20 228/97 (140) 93 Nasal Cannula 4.0 





    203/82 (122)    








Physical Exam:


GENERAL: Patient is in no acute distress.


HEENT: No acute trauma, normocephalic, mucous membranes moist, no nasal 

congestion, no scleral icterus.


NECK: No stridor, trachea is midline.


LUNGS: Coarse breath sounds bilaterally, no wheeze, no rhonchi, breath sounds 

equal.


HEART: Without murmurs gallops or rubs, regular rate and rhythm.


ABDOMEN: Soft, nontender, bowel sounds positive, no hepatosplenomegaly 


EXTREMITIES: No cyanosis or edema, limited range of motion, generalized weakness

, LLE in splint


NEUROLOGIC: Oriented, no acute motor or sensory deficits, no focal weakness.


SKIN: No rash, no jaundice, no diaphoresis.





Assessment & Plan


ACUTE ON CHRONIC RESPIRATORY FAILURE: with hypercapnia


-Chronic Oxygen Dependency: 2-4 L at baseline 


-Acute is likely secondary to COPD exacerbation and complicated by Pneumonia


-has advanced COPD at baseline


-could also have a component of obesity hypoventilation syndrome


-CXR showed dense bibasilar airspace consolidation, right greater than left 

with associated pleural effusions.


-CTA: showed no evidence for PE. Dense bilateral lower lobe atelectasis/

consolidation. Patchy airspace opacities are also present within the right 

upper lobe lingula and right middle lobe.


-Completed Levaquin and Zosyn course


-Negative influenza test 


-IV Solumedrol initially transitioned to PO prednisone


-BIPAP qHS and PRN


-continue oxygen support


-blood cultures: no growth


-Pulm consulted, appreciated recs 


-was given Lasix 20mg daily to help fluid overload 


-TTE Report: 


            The echocardiogram is extremely technically limited.


   Patient was sitting upright for the test.


   There is no significant pericardial effusion noted.


   The left ventricular systolic function is grossly normal on limited 

visualization.


   The right ventricular systolic function is grossly normal.


   The valves are poorly visualized.


   There is no significant valvular stenosis or regurgitation on technically 

limited Doppler evaluation.


-due to ongoing deterioration, Palliative care consulted, patient was made 

comfort measures per discussion of previous attending with the patient and her 

family


-Oxygen support for comfort


-family agrees with current management


-remains on comfort measures only





ORAL CANDIDIASIS:


-on Nystatin Day #7





PAROXYSMAL ATRIAL FLUTTER: with RVR


-HR likely worse due to respiratory failure


-Coumadin on hold as comfort measure only





ELECTROLYTE DERANGEMENT: Hypokalemia/Hyponatremia: 


-likely secondary to poor oral intake and diuretics





LEFT ANKLE FRACTURE: s/p fall 


-Left ankle xray showed distal tibial and fibular fractures as above with mild 

lateral subluxation of the talus.


-Orthopedics consulted, appreciate input 


-Conservative management of the Ankle as high risk for surgery; s/p cast boot 

for the Left foot


-Toe Touch allowed 


-Avoid full weight bearing for 6 weeks in total 





RIGHT PELVIC RING FRACTURE: 


-Pelvis xray showed an age indeterminant right pubic ring fracture, possibly 

chronic.


-pain control


-PT/OT as tolerated





HTN:


-labile likely related to anxiety


-was on lisinopril


-monitor 





ULCERATIVE COLITIS:


-continued on Pentasa


-no acute issues





DEPRESSION:


-continue on Celexa


-stable





Dispo: Stable for transfer to facility with comfort measures and hospice


Consultants:


Pulmonary


Current Inpatient Medications:





Current Inpatient Medications








 Medications


  (Trade)  Dose


 Ordered  Sig/Donna


 Route  Start Time


 Stop Time Status Last Admin


Dose Admin


 


 Tramadol HCl


  (Ultram Tab)  50 mg  Q6H  PRN


 PO  1/1/18 21:30


 1/31/18 21:29  1/23/18 07:53


50 MG


 


 Latanoprost


  (Xalatan Oph


 Soln)  1 drops  HS


 OP  1/2/18 21:00


 2/1/18 20:59  1/23/18 19:55


1 DROPS


 


 Mesalamine


  (Pentasa


 Controlled Rel


 Cap)  1,000 mg  BID


 PO  1/2/18 09:00


 2/1/18 08:59  1/24/18 11:43


1,000 MG


 


 Mirtazapine


  (Remeron Tab)  15 mg  HS


 PO  1/2/18 21:00


 2/1/18 20:59  1/23/18 19:57


15 MG


 


 Pantoprazole


 Sodium


  (Protonix Tab)  40 mg  DAILY


 PO  1/2/18 09:00


 2/1/18 08:59  1/24/18 11:43


40 MG


 


 Senna/Docusate


 Sodium


  (Senokot S Tab)  1 tab  DAILY


 PO  1/2/18 09:00


 2/1/18 08:59  1/24/18 11:43


1 TAB


 


 Acetaminophen


  (Tylenol Tab)  650 mg  Q4H  PRN


 PO  1/1/18 21:45


 1/31/18 21:44  1/10/18 17:23


650 MG


 


 Ondansetron HCl


  (Zofran Inj)  4 mg  Q6H  PRN


 IV  1/1/18 21:45


 1/31/18 21:44   


 


 


 Miconazole Nitrate


  (Desenex Powder)  1 appln  TID  PRN


 EXT  1/16/18 17:15


 2/15/18 17:14  1/17/18 21:14


1 APPLN


 


 Nystatin


  (Mycostatin Susp)  5 ml  QID


 PO  1/18/18 17:00


 1/28/18 16:59  1/24/18 18:19


5 ML


 


 Morphine Sulfate


  (Roxanol Oral


 Soln)  5 mg  Q3H  PRN


 PO  1/20/18 17:15


 2/3/18 17:14  1/23/18 16:43


5 MG


 


 Scopolamine


  (Transderm-Scop


 Patch)  1.5 mg  Q72H


 TD  1/21/18 12:45


 2/20/18 12:44  1/24/18 11:41


1.5 MG


 


 Miscellaneous


  (Remove


 Transderm-Scop


 Patch)  1 ea  Q72H


 N/A  1/24/18 12:45


 2/23/18 12:44  1/24/18 11:41


1 EA


 


 Miscellaneous


 Information


  (Check


 Scopolamine Patch


 Placement)  1 ea  QS


 N/A  1/21/18 16:00


 2/20/18 15:59  1/24/18 15:21


1 EA


 


 Al Hydrox/Mg


 Hydrox/Simethicone


  (Maalox Max Susp)  15 ml  Q6H  PRN


 PO  1/21/18 16:30


 2/20/18 16:29   


 


 


 Lorazepam


  (Ativan Tab)  1 mg  Q4H  PRN


 PO  1/22/18 14:15


 2/21/18 14:14  1/24/18 14:37


1 MG


 


 Fentanyl


  (Duragesic Patch)  12 mcg  Q72H


 TD  1/23/18 18:30


 2/6/18 18:29  1/23/18 18:32


12 MCG


 


 Miscellaneous


  (Fentanyl Patch


 Remove & Waste)  1 ea  Q3D


 N/A  1/26/18 18:29


 2/25/18 18:28   


 


 


 Miscellaneous


 Information


  (Check Fentanyl


 Patch Placement)  1 ea  QS


 N/A  1/24/18 00:00


 2/23/18 00:00  1/24/18 15:20


1 EA

## 2018-01-25 RX ADMIN — NYSTATIN SCH ML: 100000 SUSPENSION ORAL at 12:00

## 2018-01-25 RX ADMIN — PANTOPRAZOLE SCH MG: 40 TABLET, DELAYED RELEASE ORAL at 08:00

## 2018-01-25 RX ADMIN — NYSTATIN SCH ML: 100000 SUSPENSION ORAL at 08:00

## 2018-01-25 RX ADMIN — MESALAMINE SCH MG: 250 CAPSULE ORAL at 08:00

## 2018-01-25 RX ADMIN — NYSTATIN SCH ML: 100000 SUSPENSION ORAL at 16:17

## 2018-01-25 RX ADMIN — STANDARDIZED SENNA CONCENTRATE AND DOCUSATE SODIUM SCH TAB: 8.6; 5 TABLET ORAL at 08:00

## 2018-01-25 RX ADMIN — NYSTATIN SCH ML: 100000 SUSPENSION ORAL at 20:06

## 2018-01-25 RX ADMIN — MORPHINE SULFATE PRN MG: 20 SOLUTION ORAL at 23:55

## 2018-01-25 RX ADMIN — MORPHINE SULFATE PRN MG: 20 SOLUTION ORAL at 01:53

## 2018-01-25 RX ADMIN — LATANOPROST SCH DROPS: 50 SOLUTION/ DROPS OPHTHALMIC at 20:10

## 2018-01-25 RX ADMIN — LORAZEPAM PRN MG: 1 TABLET ORAL at 20:13

## 2018-01-25 RX ADMIN — MIRTAZAPINE SCH MG: 15 TABLET, FILM COATED ORAL at 20:02

## 2018-01-25 RX ADMIN — MESALAMINE SCH MG: 250 CAPSULE ORAL at 20:03

## 2018-01-25 RX ADMIN — LORAZEPAM PRN MG: 1 TABLET ORAL at 08:18

## 2018-01-25 NOTE — PROGRESS NOTE
Medicine Progress Note


Date & Time of Visit:


Jan 25, 2018 at 19:05.


Subjective


Patient reports feeling ok; denies any complaints. No overnight events noted. 

Tolerating PO. Family was not at the bedside but daughter Shanat was contacted 

by phone and questions were answered.





Objective





Last 8 Hrs








  Date Time  Temp Pulse Resp B/P (MAP) Pulse Ox O2 Delivery O2 Flow Rate FiO2


 


1/25/18 16:00      Nasal Cannula 4.0 








Physical Exam:


GENERAL: Patient is in no acute distress.


HEENT: No acute trauma, normocephalic, mucous membranes moist, no nasal 

congestion, no scleral icterus.


NECK: No stridor, trachea is midline.


LUNGS: Coarse breath sounds bilaterally, no wheeze, no rhonchi, breath sounds 

equal.


HEART: Without murmurs gallops or rubs, regular rate and rhythm.


ABDOMEN: Soft, nontender, bowel sounds positive, no hepatosplenomegaly 


EXTREMITIES: No cyanosis or edema, limited range of motion, generalized weakness

, LLE in splint


NEUROLOGIC: Oriented, no acute motor or sensory deficits, no focal weakness.


SKIN: No rash, no jaundice, no diaphoresis.





Assessment & Plan


ACUTE ON CHRONIC RESPIRATORY FAILURE: with hypercapnia


-Chronic Oxygen Dependency: 2-4 L at baseline 


-Acute is likely secondary to COPD exacerbation and complicated by Pneumonia


-has advanced COPD at baseline


-could also have a component of obesity hypoventilation syndrome


-CXR showed dense bibasilar airspace consolidation, right greater than left 

with associated pleural effusions.


-CTA: showed no evidence for PE. Dense bilateral lower lobe atelectasis/

consolidation. Patchy airspace opacities are also present within the right 

upper lobe lingula and right middle lobe.


-Completed Levaquin and Zosyn course


-Negative influenza test 


-IV Solumedrol initially transitioned to PO prednisone


-BIPAP qHS and PRN


-continue oxygen support


-blood cultures: no growth


-Pulm consulted, appreciated recs 


-was given Lasix 20mg daily to help fluid overload 


-TTE Report: 


            The echocardiogram is extremely technically limited.


   Patient was sitting upright for the test.


   There is no significant pericardial effusion noted.


   The left ventricular systolic function is grossly normal on limited 

visualization.


   The right ventricular systolic function is grossly normal.


   The valves are poorly visualized.


   There is no significant valvular stenosis or regurgitation on technically 

limited Doppler evaluation.


-due to ongoing deterioration, Palliative care consulted, patient was made 

comfort measures per discussion of previous attending with the patient and her 

family


-Oxygen support for comfort


-family agrees with current management


-remains on comfort measures only





ORAL CANDIDIASIS:


-on Nystatin Day #7





PAROXYSMAL ATRIAL FLUTTER: with RVR


-HR likely worse due to respiratory failure


-Coumadin on hold as comfort measure only





ELECTROLYTE DERANGEMENT: Hypokalemia/Hyponatremia: 


-likely secondary to poor oral intake and diuretics





LEFT ANKLE FRACTURE: s/p fall 


-Left ankle xray showed distal tibial and fibular fractures as above with mild 

lateral subluxation of the talus.


-Orthopedics consulted, appreciate input 


-Conservative management of the Ankle as high risk for surgery; s/p cast boot 

for the Left foot


-Toe Touch allowed 


-Avoid full weight bearing for 6 weeks in total 





RIGHT PELVIC RING FRACTURE: 


-Pelvis xray showed an age indeterminant right pubic ring fracture, possibly 

chronic.


-pain control


-PT/OT as tolerated





HTN:


-labile likely related to anxiety


-was on lisinopril


-monitor 





ULCERATIVE COLITIS:


-continued on Pentasa


-no acute issues





DEPRESSION:


-continue on Celexa


-stable





Dispo: Stable for transfer to facility with comfort measures and hospice, 

awaiting availability of bed


Consultants:


Pulmonary


Current Inpatient Medications:





Current Inpatient Medications








 Medications


  (Trade)  Dose


 Ordered  Sig/Donna


 Route  Start Time


 Stop Time Status Last Admin


Dose Admin


 


 Tramadol HCl


  (Ultram Tab)  50 mg  Q6H  PRN


 PO  1/1/18 21:30


 1/31/18 21:29  1/23/18 07:53


50 MG


 


 Latanoprost


  (Xalatan Oph


 Soln)  1 drops  HS


 OP  1/2/18 21:00


 2/1/18 20:59  1/24/18 19:51


1 DROPS


 


 Mesalamine


  (Pentasa


 Controlled Rel


 Cap)  1,000 mg  BID


 PO  1/2/18 09:00


 2/1/18 08:59  1/24/18 19:51


1,000 MG


 


 Mirtazapine


  (Remeron Tab)  15 mg  HS


 PO  1/2/18 21:00


 2/1/18 20:59  1/24/18 19:51


15 MG


 


 Pantoprazole


 Sodium


  (Protonix Tab)  40 mg  DAILY


 PO  1/2/18 09:00


 2/1/18 08:59  1/24/18 11:43


40 MG


 


 Senna/Docusate


 Sodium


  (Senokot S Tab)  1 tab  DAILY


 PO  1/2/18 09:00


 2/1/18 08:59  1/24/18 11:43


1 TAB


 


 Acetaminophen


  (Tylenol Tab)  650 mg  Q4H  PRN


 PO  1/1/18 21:45


 1/31/18 21:44  1/10/18 17:23


650 MG


 


 Ondansetron HCl


  (Zofran Inj)  4 mg  Q6H  PRN


 IV  1/1/18 21:45


 1/31/18 21:44   


 


 


 Miconazole Nitrate


  (Desenex Powder)  1 appln  TID  PRN


 EXT  1/16/18 17:15


 2/15/18 17:14  1/17/18 21:14


1 APPLN


 


 Nystatin


  (Mycostatin Susp)  5 ml  QID


 PO  1/18/18 17:00


 1/28/18 16:59  1/24/18 19:51


5 ML


 


 Morphine Sulfate


  (Roxanol Oral


 Soln)  5 mg  Q3H  PRN


 PO  1/20/18 17:15


 2/3/18 17:14  1/25/18 01:53


5 MG


 


 Scopolamine


  (Transderm-Scop


 Patch)  1.5 mg  Q72H


 TD  1/21/18 12:45


 2/20/18 12:44  1/24/18 11:41


1.5 MG


 


 Miscellaneous


  (Remove


 Transderm-Scop


 Patch)  1 ea  Q72H


 N/A  1/24/18 12:45


 2/23/18 12:44  1/24/18 11:41


1 EA


 


 Miscellaneous


 Information


  (Check


 Scopolamine Patch


 Placement)  1 ea  QS


 N/A  1/21/18 16:00


 2/20/18 15:59  1/25/18 16:17


1 EA


 


 Al Hydrox/Mg


 Hydrox/Simethicone


  (Maalox Max Susp)  15 ml  Q6H  PRN


 PO  1/21/18 16:30


 2/20/18 16:29   


 


 


 Lorazepam


  (Ativan Tab)  1 mg  Q4H  PRN


 PO  1/22/18 14:15


 2/21/18 14:14  1/25/18 08:18


1 MG


 


 Fentanyl


  (Duragesic Patch)  12 mcg  Q72H


 TD  1/23/18 18:30


 2/6/18 18:29  1/23/18 18:32


12 MCG


 


 Miscellaneous


  (Fentanyl Patch


 Remove & Waste)  1 ea  Q3D


 N/A  1/26/18 18:29


 2/25/18 18:28   


 


 


 Miscellaneous


 Information


  (Check Fentanyl


 Patch Placement)  1 ea  QS


 N/A  1/24/18 00:00


 2/23/18 00:00  1/25/18 16:17


1 EA

## 2018-01-26 VITALS
TEMPERATURE: 97.7 F | HEART RATE: 68 BPM | DIASTOLIC BLOOD PRESSURE: 82 MMHG | SYSTOLIC BLOOD PRESSURE: 203 MMHG | OXYGEN SATURATION: 93 %

## 2018-01-26 RX ADMIN — MORPHINE SULFATE PRN MG: 20 SOLUTION ORAL at 14:09

## 2018-01-26 RX ADMIN — MESALAMINE SCH MG: 250 CAPSULE ORAL at 08:00

## 2018-01-26 RX ADMIN — MORPHINE SULFATE PRN MG: 20 SOLUTION ORAL at 05:25

## 2018-01-26 RX ADMIN — NYSTATIN SCH ML: 100000 SUSPENSION ORAL at 12:00

## 2018-01-26 RX ADMIN — PANTOPRAZOLE SCH MG: 40 TABLET, DELAYED RELEASE ORAL at 08:00

## 2018-01-26 RX ADMIN — NYSTATIN SCH ML: 100000 SUSPENSION ORAL at 08:00

## 2018-01-26 RX ADMIN — STANDARDIZED SENNA CONCENTRATE AND DOCUSATE SODIUM SCH TAB: 8.6; 5 TABLET ORAL at 08:00

## 2018-01-27 NOTE — DISCHARGE SUMMARY
Discharge Summary


Date of Service


Jan 27, 2018.





Discharge Summary


Admission Date:


Jan 1, 2018 at 21:03


Discharge Date:  Jan 26, 2018


Discharge Disposition:  Skilled nursing facility


Principal Diagnosis:


respiratory failure, left ankle fracture, a fib with rvr


Consultations:


Pulmonary


Pending Studies/Follow-Up:


Hospice care





Medication Reconciliation


New Medications:  


Fentanyl (Fentanyl) 12 Mcg Tdsy


12 MCG TD Q72H, #2 PATCH





Lorazepam (Lorazepam) 1 Mg Tab


1 MG PO Q4H PRN for Anxiety, #20 TAB





Miconazole Nitrate (Desenex Shake Powder) 43 Appln/43 Gm Powd


1 APPLN EXT TID PRN for Affected Skin Folds, #1 BTL





Morphine Sulfate (Morphine Sulfate) 10 Mg/0.5 Ml Soln


5 MG PO Q3H PRN for Pain, #5 ML





Scopolamine (Transderm-Scop) 1 Mg/3 Days Dis


1.5 MG TD Q72H, #7 PATCH





Tramadol HCl (Tramadol HCl) 50 Mg Tab


50 MG PO Q6H PRN for Pain, #20 TAB





 


Continued Medications:  


Acetaminophen (Tylenol) 325 Mg Tab


650 MG PO Q4 PRN for Pain or Fever, TAB





Docusate Sodium (Colace) 100 Mg Cap


1 CAP PO BID for 30 Days, #60 CAP





Ipratropium-Albuterol (Combivent Respimat) 1 Aer Aer


1 PUFFS INH QID, INH





Latanoprost (Xalatan 0.005% Oph Sol) 0.005 % Sol


1 DROPS OP HS for 90 Days, #7.5 ML 3 Refills





Lidocaine (Lidoderm Patch 5%) 1 Ea Tdsy


1 PATCH TOP ONAMOFFPM


APPLY ONE PATCH TO RIGHT HIP EVERY MORNING AND REMOVE IN THE EVENING


Mesalamine (Pentasa) 500 Mg Caper


1000 MG PO BID, CAP





Mirtazapine (Remeron) 15 Mg Tab


15 MG PO HS, TAB





Pantoprazole (Protonix) 40 Mg Tab


40 MG PO DAILY





Polyethylene Glycol 3350 (Miralax) 1 Pow Pow


17 GM PO DAILY, #255 GM





Sennosides-Docusate Sodium (Senna S) 1 Tab Tab


1 TAB PO DAILY





 


Discontinued Medications:  


Aspirin (Aspirin Ec) 81 Mg Tab


81 MG PO DAILY





Calcium Carbonate-Cholecalcife (Calcium/Vitamin D3 600-400 mg-Unit) 1 Tab Tab


1 TAB PO DAILY





Citalopram (Citalopram Hydrobromide) 40 Mg Tab


40 MG PO DAILY





Docusate Sodium (Docusate Sodium) 100 Mg Cap


100 MG PO BID





Furosemide (Lasix) 20 Mg Tab


20 MG PO QAM, TAB





Lisinopril (Prinivil) 20 Mg Tab


20 MG PO DAILY, TAB





Simvastatin (Zocor) 20 Mg Tab


20 MG PO HS











Admission Information


HPI (per Admitting provider):


HISTORY OF PRESENT ILLNESS:  The patient is a pleasant 82-year-old female


with a history of COPD requiring oxygen, 2 liters by nasal cannula,


hypertension, ulcerative colitis presented with increasing shortness of


breath times 2-3 days.  The patient has been following with Dr. Juarez


for primary care.  The patient has been doing okay, doing baseline good


health until about 2-3 days ago when she started to have increasing shortness


of breath, dry cough.  She also has had an episode of fall 2 days ago and has


been having difficulty ambulating because of left foot pain.  She also had 2


more falls since then because apparently she "was losing her balance." 


Persistence of the symptoms, finally came to the ER.  At the ER, blood


pressure was 230/115, improving to 180 systolically.  Pulse rate was 115


initially, respiratory rate initially was 36, saturating 80% on room air. 


The patient was received with bilateral wheezing.  Chest x-ray was showing


dense bibasilar airspace consolidation, right greater than left with


associated pleural effusions typical for pneumonia or aspiration pneumonitis.


 She was given DuoNeb, Solu-Medrol and started on vancomycin and Levaquin. 


On my exam, the patient was seen resting comfortably with her BiPAP in place.


 She states that she is feeling somewhat improved compared to admission. 


Denies having any active shortness of breath, chest pain, palpitations,


nausea, vomiting, dizziness, headache.  No other symptoms noted.


 


REVIEW OF SYSTEMS:  Ten systems reviewed and negative except for the ones


mentioned above.


 


PAST MEDICAL HISTORY:  COPD, moderate on chronic oxygen use 2 liters at


daytime, 4 liters at nighttime, hypertension, ulcerative colitis, depression,


dyslipidemia, status post CEA.


 


MEDICATIONS:  Combivent 4 times a day, lisinopril 20 mg daily, Lasix 20 mg


daily, aspirin 81 mg daily, Pentasa 1000 mg b.i.d., Celexa 40 mg daily,


mirtazapine 50 mg at bedtime, simvastatin 20 mg daily, Protonix 40 mg daily.


 


PAST SURGICAL HISTORY:  Colonoscopy, EGD, knee surgery, total hip


replacement, right side in 2012, hysterectomy.


 


PERSONAL AND SOCIAL HISTORY:  She is .  Current every day smoker. 


Denies alcohol or drug use.


 


FAMILY HISTORY:  Father, heart disease.  Mother, cancer.


 


PHYSICAL EXAMINATION:


VITAL SIGNS:  Blood pressure 148/68, pulse rate of 97, respiratory rate of


20, temperature is 37.3, saturating 95% on BiPAP, FiO2 100%.


GENERAL:  The patient is awake, alert, oriented x3, not in distress, speaks


few sentences, but no accessory muscle use.


HEAD AND NECK:  Atraumatic and normocephalic.  Normal pupils.  Full EOMs.  No


icterus.  Pink conjunctivae.


ENT:  Grossly normal.


NECK:  No JVD, no lymphadenopathy or thyromegaly.


HEART:  Normal rate.  Regular rhythm with S1, S2.  No murmurs.


LUNGS:  Positive bilateral wheezing and rhonchi bilaterally, mild to


moderate.


ABDOMEN:  Nondistended, normal bowel sounds, soft, nontender.


EXTREMITIES:  No bipedal edema noted.  No rashes.


NEUROLOGIC:  No gross focal motor or sensory deficits.





Hospital Course


ACUTE ON CHRONIC RESPIRATORY FAILURE: with hypercapnia


-Chronic Oxygen Dependency: 2-4 L at baseline 


-Acute is likely secondary to COPD exacerbation and complicated by Pneumonia


-has advanced COPD at baseline


-could also have a component of obesity hypoventilation syndrome


-CXR showed dense bibasilar airspace consolidation, right greater than left 

with associated pleural effusions.


-CTA: showed no evidence for PE. Dense bilateral lower lobe atelectasis/

consolidation. Patchy airspace opacities are also present within the right 

upper lobe lingula and right middle lobe.


-Completed Levaquin and Zosyn course


-Negative influenza test 


-IV Solumedrol initially transitioned to PO prednisone


-BIPAP qHS and PRN


-continue oxygen support


-blood cultures: no growth


-Pulm consulted, appreciated recs 


-was given Lasix 20mg daily to help fluid overload 


-TTE Report: 


            The echocardiogram is extremely technically limited.


   Patient was sitting upright for the test.


   There is no significant pericardial effusion noted.


   The left ventricular systolic function is grossly normal on limited 

visualization.


   The right ventricular systolic function is grossly normal.


   The valves are poorly visualized.


   There is no significant valvular stenosis or regurgitation on technically 

limited Doppler evaluation.


-due to ongoing deterioration, Palliative care consulted, patient was made 

comfort measures per discussion of previous attending with the patient and her 

family


-Oxygen support for comfort


-family agrees with current management


-remains on comfort measures only





ORAL CANDIDIASIS:


-on Nystatin, course complete 





PAROXYSMAL ATRIAL FLUTTER: with RVR


-HR likely worse due to respiratory failure


-Coumadin on hold as comfort measure only





ELECTROLYTE DERANGEMENT: Hypokalemia/Hyponatremia: 


-likely secondary to poor oral intake and diuretics





LEFT ANKLE FRACTURE: s/p fall 


-Left ankle xray showed distal tibial and fibular fractures as above with mild 

lateral subluxation of the talus.


-Orthopedics consulted, appreciate input 


-Conservative management of the Ankle as high risk for surgery; s/p cast boot 

for the Left foot


-Toe Touch allowed 


-Avoid full weight bearing for 6 weeks in total 





RIGHT PELVIC RING FRACTURE: 


-Pelvis xray showed an age indeterminant right pubic ring fracture, possibly 

chronic.


-pain control


-PT/OT as tolerated





HTN:


-labile likely related to anxiety


-was on lisinopril


-monitor 





ULCERATIVE COLITIS:


-continued on Pentasa


-no acute issues





DEPRESSION:


-continue on Celexa


-stable





PHYSICAL EXAM ON DAY OF DISCHARGE:


GENERAL: Patient is in no acute distress.


HEENT: No acute trauma, normocephalic, mucous membranes moist, no nasal 

congestion, no scleral icterus.


NECK: No stridor, trachea is midline.


LUNGS: Coarse breath sounds bilaterally, no wheeze, no rhonchi, breath sounds 

equal.


HEART: Without murmurs gallops or rubs, regular rate and rhythm.


ABDOMEN: Soft, nontender, bowel sounds positive, no hepatosplenomegaly 


EXTREMITIES: No cyanosis or edema, limited range of motion, generalized weakness

, LLE in splint


NEUROLOGIC: Oriented, no acute motor or sensory deficits, no focal weakness.


SKIN: No rash, no jaundice, no diaphoresis.


Total time spent on discharge = 41


This includes examination of the patient, discharge planning, medication 

reconciliation, and communication with other providers.





Discharge Instructions


None